# Patient Record
Sex: MALE | Race: WHITE | Employment: FULL TIME | URBAN - METROPOLITAN AREA
[De-identification: names, ages, dates, MRNs, and addresses within clinical notes are randomized per-mention and may not be internally consistent; named-entity substitution may affect disease eponyms.]

---

## 2017-12-22 ENCOUNTER — HOSPITAL ENCOUNTER (OUTPATIENT)
Age: 62
Discharge: HOME | End: 2017-12-22
Payer: COMMERCIAL

## 2017-12-22 LAB
ABSOLUTE EOS #: 0.3 K/UL (ref 0–0.4)
ABSOLUTE IMMATURE GRANULOCYTE: ABNORMAL K/UL (ref 0–0.3)
ABSOLUTE LYMPH #: 2 K/UL (ref 1–4.8)
ABSOLUTE MONO #: 0.8 K/UL (ref 0.2–0.8)
ALBUMIN SERPL-MCNC: 4.1 G/DL (ref 3.5–5.2)
ALBUMIN/GLOBULIN RATIO: ABNORMAL (ref 1–2.5)
ALP BLD-CCNC: 1139 U/L (ref 40–129)
ALT SERPL-CCNC: 54 U/L (ref 5–41)
ANION GAP SERPL CALCULATED.3IONS-SCNC: 14 MMOL/L (ref 9–17)
AST SERPL-CCNC: 43 U/L
BASOPHILS # BLD: 0 % (ref 0–2)
BASOPHILS ABSOLUTE: 0 K/UL (ref 0–0.2)
BILIRUB SERPL-MCNC: 0.59 MG/DL (ref 0.3–1.2)
BUN BLDV-MCNC: 20 MG/DL (ref 8–23)
BUN/CREAT BLD: 19 (ref 9–20)
CALCIUM SERPL-MCNC: 9.4 MG/DL (ref 8.6–10.4)
CHLORIDE BLD-SCNC: 101 MMOL/L (ref 98–107)
CO2: 26 MMOL/L (ref 20–31)
CREAT SERPL-MCNC: 1.04 MG/DL (ref 0.7–1.2)
DIFFERENTIAL TYPE: ABNORMAL
EOSINOPHILS RELATIVE PERCENT: 2 % (ref 1–4)
GFR AFRICAN AMERICAN: >60 ML/MIN
GFR NON-AFRICAN AMERICAN: >60 ML/MIN
GFR SERPL CREATININE-BSD FRML MDRD: ABNORMAL ML/MIN/{1.73_M2}
GFR SERPL CREATININE-BSD FRML MDRD: ABNORMAL ML/MIN/{1.73_M2}
GLUCOSE BLD-MCNC: 116 MG/DL (ref 70–99)
HCT VFR BLD CALC: 49.3 % (ref 41–53)
HEMOGLOBIN: 16.8 G/DL (ref 13.5–17.5)
IMMATURE GRANULOCYTES: ABNORMAL %
LYMPHOCYTES # BLD: 18 % (ref 24–44)
MCH RBC QN AUTO: 29.4 PG (ref 26–34)
MCHC RBC AUTO-ENTMCNC: 34 G/DL (ref 31–37)
MCV RBC AUTO: 86.2 FL (ref 80–100)
MONOCYTES # BLD: 7 % (ref 1–7)
PDW BLD-RTO: 13.3 % (ref 11.5–14.5)
PLATELET # BLD: 159 K/UL (ref 130–400)
PLATELET ESTIMATE: ABNORMAL
PMV BLD AUTO: ABNORMAL FL (ref 6–12)
POTASSIUM SERPL-SCNC: 4.4 MMOL/L (ref 3.7–5.3)
RBC # BLD: 5.71 M/UL (ref 4.5–5.9)
RBC # BLD: ABNORMAL 10*6/UL
SEG NEUTROPHILS: 73 % (ref 36–66)
SEGMENTED NEUTROPHILS ABSOLUTE COUNT: 8.2 K/UL (ref 1.8–7.7)
SODIUM BLD-SCNC: 141 MMOL/L (ref 135–144)
TOTAL PROTEIN: 7.5 G/DL (ref 6.4–8.3)
TSH SERPL DL<=0.05 MIU/L-ACNC: 2.2 MIU/L (ref 0.3–5)
WBC # BLD: 11.3 K/UL (ref 3.5–11)
WBC # BLD: ABNORMAL 10*3/UL

## 2017-12-22 PROCEDURE — 80053 COMPREHEN METABOLIC PANEL: CPT

## 2017-12-22 PROCEDURE — 36415 COLL VENOUS BLD VENIPUNCTURE: CPT

## 2017-12-22 PROCEDURE — 84443 ASSAY THYROID STIM HORMONE: CPT

## 2017-12-22 PROCEDURE — 85025 COMPLETE CBC W/AUTO DIFF WBC: CPT

## 2017-12-25 ENCOUNTER — HOSPITAL ENCOUNTER (INPATIENT)
Age: 62
LOS: 4 days | Discharge: HOME | DRG: 193 | End: 2017-12-29
Attending: EMERGENCY MEDICINE | Admitting: INTERNAL MEDICINE
Payer: COMMERCIAL

## 2017-12-25 ENCOUNTER — APPOINTMENT (OUTPATIENT)
Dept: CT IMAGING | Age: 62
DRG: 193 | End: 2017-12-25
Payer: COMMERCIAL

## 2017-12-25 ENCOUNTER — APPOINTMENT (OUTPATIENT)
Dept: GENERAL RADIOLOGY | Age: 62
DRG: 193 | End: 2017-12-25
Payer: COMMERCIAL

## 2017-12-25 DIAGNOSIS — J18.9 PNEUMONIA DUE TO ORGANISM: Primary | ICD-10-CM

## 2017-12-25 LAB
ABSOLUTE EOS #: 0.3 K/UL (ref 0–0.4)
ABSOLUTE IMMATURE GRANULOCYTE: ABNORMAL K/UL (ref 0–0.3)
ABSOLUTE LYMPH #: 2 K/UL (ref 1–4.8)
ABSOLUTE MONO #: 1 K/UL (ref 0.2–0.8)
ALBUMIN SERPL-MCNC: 3.8 G/DL (ref 3.5–5.2)
ALBUMIN/GLOBULIN RATIO: ABNORMAL (ref 1–2.5)
ALP BLD-CCNC: 1093 U/L (ref 40–129)
ALT SERPL-CCNC: 32 U/L (ref 5–41)
ANION GAP SERPL CALCULATED.3IONS-SCNC: 14 MMOL/L (ref 9–17)
AST SERPL-CCNC: 24 U/L
BASOPHILS # BLD: 1 % (ref 0–2)
BASOPHILS ABSOLUTE: 0.1 K/UL (ref 0–0.2)
BILIRUB SERPL-MCNC: 0.4 MG/DL (ref 0.3–1.2)
BILIRUBIN DIRECT: 0.12 MG/DL
BILIRUBIN, INDIRECT: 0.28 MG/DL (ref 0–1)
BNP INTERPRETATION: NORMAL
BUN BLDV-MCNC: 13 MG/DL (ref 8–23)
BUN/CREAT BLD: 18 (ref 9–20)
CALCIUM SERPL-MCNC: 8.5 MG/DL (ref 8.6–10.4)
CHLORIDE BLD-SCNC: 102 MMOL/L (ref 98–107)
CO2: 24 MMOL/L (ref 20–31)
CREAT SERPL-MCNC: 0.72 MG/DL (ref 0.7–1.2)
D-DIMER QUANTITATIVE: 5.13 MG/L FEU
DIFFERENTIAL TYPE: ABNORMAL
EKG ATRIAL RATE: 92 BPM
EKG P AXIS: 64 DEGREES
EKG P-R INTERVAL: 150 MS
EKG Q-T INTERVAL: 348 MS
EKG QRS DURATION: 96 MS
EKG QTC CALCULATION (BAZETT): 430 MS
EKG R AXIS: -30 DEGREES
EKG T AXIS: 69 DEGREES
EKG VENTRICULAR RATE: 92 BPM
EOSINOPHILS RELATIVE PERCENT: 2 % (ref 1–4)
GFR AFRICAN AMERICAN: >60 ML/MIN
GFR NON-AFRICAN AMERICAN: >60 ML/MIN
GFR SERPL CREATININE-BSD FRML MDRD: ABNORMAL ML/MIN/{1.73_M2}
GFR SERPL CREATININE-BSD FRML MDRD: ABNORMAL ML/MIN/{1.73_M2}
GLOBULIN: ABNORMAL G/DL (ref 1.5–3.8)
GLUCOSE BLD-MCNC: 94 MG/DL (ref 70–99)
HCT VFR BLD CALC: 44.8 % (ref 41–53)
HEMOGLOBIN: 15.2 G/DL (ref 13.5–17.5)
IMMATURE GRANULOCYTES: ABNORMAL %
LYMPHOCYTES # BLD: 15 % (ref 24–44)
MCH RBC QN AUTO: 29.4 PG (ref 26–34)
MCHC RBC AUTO-ENTMCNC: 33.8 G/DL (ref 31–37)
MCV RBC AUTO: 86.8 FL (ref 80–100)
MONOCYTES # BLD: 7 % (ref 1–7)
PDW BLD-RTO: 14.2 % (ref 11.5–14.5)
PLATELET # BLD: 156 K/UL (ref 130–400)
PLATELET ESTIMATE: ABNORMAL
PMV BLD AUTO: 8.3 FL (ref 6–12)
POTASSIUM SERPL-SCNC: 4 MMOL/L (ref 3.7–5.3)
PRO-BNP: 101 PG/ML
RBC # BLD: 5.17 M/UL (ref 4.5–5.9)
RBC # BLD: ABNORMAL 10*6/UL
SEG NEUTROPHILS: 75 % (ref 36–66)
SEGMENTED NEUTROPHILS ABSOLUTE COUNT: 10 K/UL (ref 1.8–7.7)
SODIUM BLD-SCNC: 140 MMOL/L (ref 135–144)
TOTAL PROTEIN: 7 G/DL (ref 6.4–8.3)
TROPONIN INTERP: NORMAL
TROPONIN T: <0.03 NG/ML
WBC # BLD: 13.4 K/UL (ref 3.5–11)
WBC # BLD: ABNORMAL 10*3/UL

## 2017-12-25 PROCEDURE — 6370000000 HC RX 637 (ALT 250 FOR IP): Performed by: FAMILY MEDICINE

## 2017-12-25 PROCEDURE — 2580000003 HC RX 258: Performed by: INTERNAL MEDICINE

## 2017-12-25 PROCEDURE — 80076 HEPATIC FUNCTION PANEL: CPT

## 2017-12-25 PROCEDURE — 6360000002 HC RX W HCPCS: Performed by: FAMILY MEDICINE

## 2017-12-25 PROCEDURE — 96375 TX/PRO/DX INJ NEW DRUG ADDON: CPT

## 2017-12-25 PROCEDURE — 6360000002 HC RX W HCPCS: Performed by: EMERGENCY MEDICINE

## 2017-12-25 PROCEDURE — 85379 FIBRIN DEGRADATION QUANT: CPT

## 2017-12-25 PROCEDURE — 1200000000 HC SEMI PRIVATE

## 2017-12-25 PROCEDURE — 80048 BASIC METABOLIC PNL TOTAL CA: CPT

## 2017-12-25 PROCEDURE — 85025 COMPLETE CBC W/AUTO DIFF WBC: CPT

## 2017-12-25 PROCEDURE — 94640 AIRWAY INHALATION TREATMENT: CPT

## 2017-12-25 PROCEDURE — 84484 ASSAY OF TROPONIN QUANT: CPT

## 2017-12-25 PROCEDURE — 87040 BLOOD CULTURE FOR BACTERIA: CPT

## 2017-12-25 PROCEDURE — 6360000002 HC RX W HCPCS: Performed by: INTERNAL MEDICINE

## 2017-12-25 PROCEDURE — 6360000004 HC RX CONTRAST MEDICATION: Performed by: EMERGENCY MEDICINE

## 2017-12-25 PROCEDURE — 94664 DEMO&/EVAL PT USE INHALER: CPT

## 2017-12-25 PROCEDURE — 83880 ASSAY OF NATRIURETIC PEPTIDE: CPT

## 2017-12-25 PROCEDURE — 99285 EMERGENCY DEPT VISIT HI MDM: CPT

## 2017-12-25 PROCEDURE — 2580000003 HC RX 258: Performed by: EMERGENCY MEDICINE

## 2017-12-25 PROCEDURE — 96374 THER/PROPH/DIAG INJ IV PUSH: CPT

## 2017-12-25 PROCEDURE — 93005 ELECTROCARDIOGRAM TRACING: CPT

## 2017-12-25 PROCEDURE — 71260 CT THORAX DX C+: CPT

## 2017-12-25 PROCEDURE — 71010 XR CHEST PORTABLE: CPT

## 2017-12-25 PROCEDURE — 2700000000 HC OXYGEN THERAPY PER DAY

## 2017-12-25 RX ORDER — FENTANYL CITRATE 50 UG/ML
25 INJECTION, SOLUTION INTRAMUSCULAR; INTRAVENOUS ONCE
Status: COMPLETED | OUTPATIENT
Start: 2017-12-25 | End: 2017-12-25

## 2017-12-25 RX ORDER — VARENICLINE TARTRATE 0.5 MG/1
0.5 TABLET, FILM COATED ORAL 2 TIMES DAILY WITH MEALS
Status: DISCONTINUED | OUTPATIENT
Start: 2017-12-25 | End: 2017-12-29 | Stop reason: HOSPADM

## 2017-12-25 RX ORDER — ALBUTEROL SULFATE 2.5 MG/3ML
SOLUTION RESPIRATORY (INHALATION)
Status: DISCONTINUED
Start: 2017-12-25 | End: 2017-12-25

## 2017-12-25 RX ORDER — SODIUM CHLORIDE 9 MG/ML
INJECTION, SOLUTION INTRAVENOUS CONTINUOUS
Status: DISCONTINUED | OUTPATIENT
Start: 2017-12-25 | End: 2017-12-27

## 2017-12-25 RX ORDER — SODIUM CHLORIDE 0.9 % (FLUSH) 0.9 %
10 SYRINGE (ML) INJECTION
Status: COMPLETED | OUTPATIENT
Start: 2017-12-25 | End: 2017-12-25

## 2017-12-25 RX ORDER — SODIUM CHLORIDE 9 MG/ML
INJECTION, SOLUTION INTRAVENOUS CONTINUOUS
Status: DISCONTINUED | OUTPATIENT
Start: 2017-12-25 | End: 2017-12-25 | Stop reason: SDUPTHER

## 2017-12-25 RX ORDER — SODIUM CHLORIDE 0.9 % (FLUSH) 0.9 %
10 SYRINGE (ML) INJECTION PRN
Status: DISCONTINUED | OUTPATIENT
Start: 2017-12-25 | End: 2017-12-29 | Stop reason: HOSPADM

## 2017-12-25 RX ORDER — SODIUM CHLORIDE 0.9 % (FLUSH) 0.9 %
10 SYRINGE (ML) INJECTION EVERY 12 HOURS SCHEDULED
Status: DISCONTINUED | OUTPATIENT
Start: 2017-12-25 | End: 2017-12-29 | Stop reason: HOSPADM

## 2017-12-25 RX ORDER — FLUOXETINE HYDROCHLORIDE 20 MG/1
20 CAPSULE ORAL DAILY
Status: DISCONTINUED | OUTPATIENT
Start: 2017-12-25 | End: 2017-12-29 | Stop reason: HOSPADM

## 2017-12-25 RX ORDER — ALBUTEROL SULFATE 2.5 MG/3ML
2.5 SOLUTION RESPIRATORY (INHALATION) EVERY 6 HOURS PRN
Status: DISCONTINUED | OUTPATIENT
Start: 2017-12-25 | End: 2017-12-29 | Stop reason: HOSPADM

## 2017-12-25 RX ORDER — FENTANYL CITRATE 50 UG/ML
50 INJECTION, SOLUTION INTRAMUSCULAR; INTRAVENOUS ONCE
Status: COMPLETED | OUTPATIENT
Start: 2017-12-25 | End: 2017-12-25

## 2017-12-25 RX ORDER — FENTANYL CITRATE 50 UG/ML
50 INJECTION, SOLUTION INTRAMUSCULAR; INTRAVENOUS 4 TIMES DAILY PRN
Status: DISCONTINUED | OUTPATIENT
Start: 2017-12-25 | End: 2017-12-25

## 2017-12-25 RX ORDER — FLUOXETINE HYDROCHLORIDE 20 MG/1
20 CAPSULE ORAL DAILY
COMMUNITY

## 2017-12-25 RX ORDER — CEFTRIAXONE 1 G/1
1 INJECTION, POWDER, FOR SOLUTION INTRAMUSCULAR; INTRAVENOUS ONCE
Status: DISCONTINUED | OUTPATIENT
Start: 2017-12-25 | End: 2017-12-25 | Stop reason: CLARIF

## 2017-12-25 RX ORDER — ACETAMINOPHEN 325 MG/1
650 TABLET ORAL EVERY 4 HOURS PRN
Status: DISCONTINUED | OUTPATIENT
Start: 2017-12-25 | End: 2017-12-29 | Stop reason: HOSPADM

## 2017-12-25 RX ORDER — PANTOPRAZOLE SODIUM 40 MG/1
40 TABLET, DELAYED RELEASE ORAL
Status: DISCONTINUED | OUTPATIENT
Start: 2017-12-25 | End: 2017-12-29 | Stop reason: HOSPADM

## 2017-12-25 RX ORDER — FENTANYL CITRATE 50 UG/ML
50 INJECTION, SOLUTION INTRAMUSCULAR; INTRAVENOUS EVERY 4 HOURS PRN
Status: DISCONTINUED | OUTPATIENT
Start: 2017-12-25 | End: 2017-12-29 | Stop reason: HOSPADM

## 2017-12-25 RX ORDER — VARENICLINE TARTRATE 1 MG/1
1 TABLET, FILM COATED ORAL 2 TIMES DAILY
Status: ON HOLD | COMMUNITY
End: 2018-01-28 | Stop reason: HOSPADM

## 2017-12-25 RX ORDER — 0.9 % SODIUM CHLORIDE 0.9 %
50 INTRAVENOUS SOLUTION INTRAVENOUS ONCE
Status: COMPLETED | OUTPATIENT
Start: 2017-12-25 | End: 2017-12-25

## 2017-12-25 RX ORDER — IBUPROFEN 600 MG/1
600 TABLET ORAL EVERY 6 HOURS PRN
Status: ON HOLD | COMMUNITY
End: 2017-12-29 | Stop reason: HOSPADM

## 2017-12-25 RX ORDER — OMEPRAZOLE 20 MG/1
20 CAPSULE, DELAYED RELEASE ORAL DAILY
COMMUNITY

## 2017-12-25 RX ADMIN — SODIUM CHLORIDE: 9 INJECTION, SOLUTION INTRAVENOUS at 21:57

## 2017-12-25 RX ADMIN — VARENICLINE TARTRATE 0.5 MG: 0.5 TABLET, FILM COATED ORAL at 18:22

## 2017-12-25 RX ADMIN — Medication 10 ML: at 14:56

## 2017-12-25 RX ADMIN — IOPAMIDOL 80 ML: 755 INJECTION, SOLUTION INTRAVENOUS at 14:56

## 2017-12-25 RX ADMIN — SODIUM CHLORIDE: 9 INJECTION, SOLUTION INTRAVENOUS at 18:22

## 2017-12-25 RX ADMIN — FENTANYL CITRATE 25 MCG: 50 INJECTION INTRAMUSCULAR; INTRAVENOUS at 16:27

## 2017-12-25 RX ADMIN — WATER 1 G: 1 INJECTION INTRAMUSCULAR; INTRAVENOUS; SUBCUTANEOUS at 15:39

## 2017-12-25 RX ADMIN — ALBUTEROL SULFATE 2.5 MG: 5 SOLUTION RESPIRATORY (INHALATION) at 13:25

## 2017-12-25 RX ADMIN — FENTANYL CITRATE 50 MCG: 50 INJECTION INTRAMUSCULAR; INTRAVENOUS at 21:56

## 2017-12-25 RX ADMIN — SODIUM CHLORIDE 50 ML: 9 INJECTION, SOLUTION INTRAVENOUS at 14:56

## 2017-12-25 RX ADMIN — FENTANYL CITRATE 50 MCG: 50 INJECTION INTRAMUSCULAR; INTRAVENOUS at 13:59

## 2017-12-25 RX ADMIN — ENOXAPARIN SODIUM 40 MG: 40 INJECTION SUBCUTANEOUS at 18:30

## 2017-12-25 RX ADMIN — AZITHROMYCIN MONOHYDRATE 500 MG: 500 INJECTION, POWDER, LYOPHILIZED, FOR SOLUTION INTRAVENOUS at 15:46

## 2017-12-25 RX ADMIN — SODIUM CHLORIDE 1000 ML: 9 INJECTION, SOLUTION INTRAVENOUS at 13:38

## 2017-12-25 ASSESSMENT — ENCOUNTER SYMPTOMS
GASTROINTESTINAL NEGATIVE: 1
ALLERGIC/IMMUNOLOGIC NEGATIVE: 1
EYES NEGATIVE: 1
SHORTNESS OF BREATH: 1
COUGH: 1

## 2017-12-25 ASSESSMENT — PAIN SCALES - GENERAL
PAINLEVEL_OUTOF10: 7
PAINLEVEL_OUTOF10: 7
PAINLEVEL_OUTOF10: 8
PAINLEVEL_OUTOF10: 7

## 2017-12-25 ASSESSMENT — PAIN DESCRIPTION - PROGRESSION: CLINICAL_PROGRESSION: GRADUALLY WORSENING

## 2017-12-25 ASSESSMENT — PAIN DESCRIPTION - ORIENTATION: ORIENTATION: RIGHT;LEFT

## 2017-12-25 ASSESSMENT — PAIN DESCRIPTION - ONSET: ONSET: ON-GOING

## 2017-12-25 ASSESSMENT — PAIN DESCRIPTION - FREQUENCY: FREQUENCY: CONTINUOUS

## 2017-12-25 ASSESSMENT — PAIN DESCRIPTION - PAIN TYPE: TYPE: ACUTE PAIN

## 2017-12-25 ASSESSMENT — PAIN DESCRIPTION - DESCRIPTORS: DESCRIPTORS: SHARP;ACHING;DULL

## 2017-12-25 NOTE — ED PROVIDER NOTES
[address] [place] [address] ED  eMERGENCY dEPARTMENT eNCOUnter      Pt Name: Roberto Nam  MRN: [id number]  [place] 1955  Date of evaluation: 12/25/2017  Provider: Cassy Dillard MD    [address] [place] [address]       Chief Complaint   Patient presents with    Chest Pain    Shortness of Breath         HISTORY OF PRESENT ILLNESS  (Location/Symptom, Timing/Onset, Context/Setting, Quality, Duration, Modifying Factors, Severity.)   Roberto Nam is a 60 y.o. male who presents to the emergency department   The complaint of intermittent chest and chest pain and cough and was initially in the left side for the last few days with pain to right side  On  coughing and inspiration now  The patient was treated with 3 courses of antibiotics  And intermittent steroid within last 6 weeks  Being schduled for stress test,,    Pt is smoker , symptomps strated 6 weeks ago  PAST MEDICAL HISTORY     Past Medical History:   Diagnosis Date    Chronic fatigue     COPD [facility]chronic obstructive pulmonary disease) ([facility] [address]     Depression          SURGICAL HISTORY       Past Surgical History:   Procedure Laterality Date    APPENDECTOMY           CURRENT MEDICATIONS       Previous Medications    FLUOXETINE (PROZAC) 20 MG CAPSULE    Take 20 mg by mouth daily    IBUPROFEN (ADVIL;MOTRIN) 600 MG TABLET    Take 600 mg by mouth every 6 hours as needed for Pain    MOMETASONE-FORMOTEROL (DULERA) 200-5 MCG/ACT INHALER    Inhale 2 puffs into the lungs every 12 hours    OMEPRAZOLE (PRILOSEC) 20 MG DELAYED RELEASE CAPSULE    Take 20 mg by mouth daily    TIOTROPIUM (SPIRIVA) 18 MCG INHALATION CAPSULE    Inhale 18 mcg into the lungs daily    VARENICLINE (CHANTIX) 0.5 MG TABLET    Take 0.5 mg by mouth 2 times daily       ALLERGIES     Review of patient's allergies indicates no known allergies. FAMILY HISTORY     History reviewed. No pertinent family history.        SOCIAL HISTORY       Social History     Social History    Marital status:      Spouse name: N/A  Number of children: N/A    Years of education: N/A     Social History Main Topics    Smoking status: Heavy Tobacco Smoker     Packs/day: 1.00     Years: 25.00     Types: Cigarettes    Smokeless tobacco: Never Used    Alcohol use No    Drug use: Unknown    Sexual activity: Not Asked     Other Topics Concern    None     Social History Narrative    None         REVIEW OF SYSTEMS    (2-9 systems for level 4, 10 or more for level 5)     Review of Systems   Eyes: Negative. Respiratory: Positive for cough and shortness of breath. Cardiovascular: Positive for chest pain. Gastrointestinal: Negative. Endocrine: Negative. Genitourinary: Negative. Musculoskeletal: Negative. Allergic/Immunologic: Negative. Neurological: Negative. Hematological: Negative. Psychiatric/Behavioral: Negative. Except as noted above the remainder of the review of systems was reviewed and negative. PHYSICAL EXAM    (up to 7 for level 4, 8 or more for level 5)     ED Triage Vitals [12/25/17 1311]   BP Temp Temp Source Pulse Resp SpO2 Height Weight   (!) 144/98 97.5 °F (36.4 °C) Oral 98 20 90 % 6' 1\" (1.854 m) 230 lb (104.3 kg)       Physical Exam   Constitutional: He is oriented to person, place, and time. He appears well-developed and well-nourished. HENT:   Head: Normocephalic and atraumatic. Neck: Neck supple. Cardiovascular: Normal rate and regular rhythm. Pulmonary/Chest: Effort normal. No respiratory distress. He has no wheezes. He has no rales. Scattered rhonchi   Abdominal: Soft. Bowel sounds are normal.   Liver palpable tenderness on touch   Musculoskeletal: Normal range of motion. Neurological: He is alert and oriented to person, place, and time. Skin: Skin is warm and dry. Nursing note and vitals reviewed.         DIAGNOSTIC RESULTS     EKG: All EKG's are interpreted by the Emergency Department Physician who either signs or Co-signs this chart in the absence of a cardiologist.    Normal sinus rhythm, left axis deviation    RADIOLOGY: :  Xr Chest Portable    Result Date: 12/25/2017  EXAMINATION: SINGLE VIEW OF THE CHEST 12/25/2017 1:28 pm COMPARISON: 12/01/2012, 1438 hours HISTORY: ORDERING SYSTEM PROVIDED HISTORY: Chest Pain TECHNOLOGIST PROVIDED HISTORY: Reason for exam:->Chest Pain Ordering Physician Provided Reason for Exam: Port upright AP CXR - SOB Acuity: Unknown Type of Exam: Unknown 68-year-old male with chest pain and shortness of breath FINDINGS: Portable AP upright view of the chest. Cardiac monitor leads overlie the chest. Cardiac and mediastinal contours unchanged and within normal limits. Mild diffuse pulmonary vascular congestion. Mild bibasilar atelectasis. Small left-sided pleural effusion. Underlying left basilar infiltrate not excluded. Atherosclerotic calcification of the thoracic aorta. No pneumothorax. No free air. No acute osseous abnormality is evident. 1. Mild diffuse pulmonary vascular congestion, small left-sided pleural effusion and mild bibasilar atelectasis. Findings may represent mild CHF related changes. 2. Underlying infiltrate at the left base not excluded. Follow-up recommend to document resolution. Ct Chest Pulmonary Embolism W Contrast    Result Date: 12/25/2017  EXAMINATION: CTA OF THE CHEST 12/25/2017 2:20 pm TECHNIQUE: CTA of the chest was performed after the administration of intravenous contrast.  Multiplanar reformatted images are provided for review. MIP images are provided for review. Dose modulation, iterative reconstruction, and/or weight based adjustment of the mA/kV was utilized to reduce the radiation dose to as low as reasonably achievable. COMPARISON: Portable chest from 12/25/2017 HISTORY: ORDERING SYSTEM PROVIDED HISTORY: d dimer elevated 68-year-old male with elevated D-dimer FINDINGS: Initial pulmonary angiogram CT exam was severely limited.   The patient was reinjected with contrast after discussion All other components within normal limits   CULTURE BLOOD #1   CULTURE BLOOD #1   D-DIMER, QUANTITATIVE   TROPONIN   BRAIN NATRIURETIC PEPTIDE   URINALYSIS       All other labs were within normal range or not returned as of this dictation. EMERGENCY DEPARTMENT COURSE and DIFFERENTIAL DIAGNOSIS/MDM:   Vitals:    Vitals:    12/25/17 1414 12/25/17 1429 12/25/17 1500 12/25/17 1515   BP: 134/66 (!) 140/52 (!) 135/56 (!) 129/52   Pulse: 95 96 94 93   Resp: 19 23 16 22   Temp:       TempSrc:       SpO2: 96% 94% 96% 97%   Weight:       Height:         Dr Jesse Gonzalez, discussed with Bravo    CONSULTS:  IP CONSULT TO INTERNAL MEDICINE    PROCEDURES:  None  Iv hydration, aerosol, antibiotics  FINAL IMPRESSION      1. Pneumonia due to organism      Possible matastatic  Disease, radiologist report    DISPOSITION/PLAN   DISPOSITION Decision To Admit 12/25/2017 03:34:42 PM    Hold tylenol  PATIENT REFERRED TO:   No follow-up provider specified.     DISCHARGE MEDICATIONS:     New Prescriptions    No medications on file           (Please note that portions of this note were completed with a voice recognition program.  Efforts were made to edit the dictations but occasionally words are mis-transcribed.)    Julianna Jung MD  Attending Emergency Physician          Julianna Jung MD  12/25/17 MD Álvaro  12/25/17 9703

## 2017-12-25 NOTE — PROGRESS NOTES
Pt admitted to room 2026. Oriented to room, call light and bed mechanics. Side rails up x2. Call light within reach. Orders reviewed.

## 2017-12-26 ENCOUNTER — APPOINTMENT (OUTPATIENT)
Dept: CT IMAGING | Age: 62
DRG: 193 | End: 2017-12-26
Payer: COMMERCIAL

## 2017-12-26 LAB
ABSOLUTE EOS #: 0.2 K/UL (ref 0–0.4)
ABSOLUTE IMMATURE GRANULOCYTE: ABNORMAL K/UL (ref 0–0.3)
ABSOLUTE LYMPH #: 1.4 K/UL (ref 1–4.8)
ABSOLUTE MONO #: 0.6 K/UL (ref 0.2–0.8)
ALBUMIN SERPL-MCNC: 3.2 G/DL (ref 3.5–5.2)
ALBUMIN/GLOBULIN RATIO: ABNORMAL (ref 1–2.5)
ALP BLD-CCNC: 870 U/L (ref 40–129)
ALT SERPL-CCNC: 28 U/L (ref 5–41)
ANION GAP SERPL CALCULATED.3IONS-SCNC: 11 MMOL/L (ref 9–17)
AST SERPL-CCNC: 20 U/L
BASOPHILS # BLD: 0 % (ref 0–2)
BASOPHILS ABSOLUTE: 0 K/UL (ref 0–0.2)
BILIRUB SERPL-MCNC: 0.53 MG/DL (ref 0.3–1.2)
BILIRUBIN DIRECT: 0.19 MG/DL
BILIRUBIN, INDIRECT: 0.34 MG/DL (ref 0–1)
BUN BLDV-MCNC: 10 MG/DL (ref 8–23)
CALCIUM SERPL-MCNC: 8.3 MG/DL (ref 8.6–10.4)
CHLORIDE BLD-SCNC: 101 MMOL/L (ref 98–107)
CO2: 25 MMOL/L (ref 20–31)
CREAT SERPL-MCNC: 0.62 MG/DL (ref 0.7–1.2)
DIFFERENTIAL TYPE: ABNORMAL
EOSINOPHILS RELATIVE PERCENT: 2 % (ref 1–4)
GFR AFRICAN AMERICAN: >60 ML/MIN
GFR NON-AFRICAN AMERICAN: >60 ML/MIN
GFR SERPL CREATININE-BSD FRML MDRD: ABNORMAL ML/MIN/{1.73_M2}
GFR SERPL CREATININE-BSD FRML MDRD: ABNORMAL ML/MIN/{1.73_M2}
GLUCOSE BLD-MCNC: 126 MG/DL (ref 70–99)
HCT VFR BLD CALC: 38.7 % (ref 41–53)
HEMOGLOBIN: 13.2 G/DL (ref 13.5–17.5)
IMMATURE GRANULOCYTES: ABNORMAL %
INR BLD: 1
LYMPHOCYTES # BLD: 14 % (ref 24–44)
MCH RBC QN AUTO: 29.1 PG (ref 26–34)
MCHC RBC AUTO-ENTMCNC: 34 G/DL (ref 31–37)
MCV RBC AUTO: 85.6 FL (ref 80–100)
MONOCYTES # BLD: 6 % (ref 1–7)
PARTIAL THROMBOPLASTIN TIME: 34 SEC (ref 23–31)
PDW BLD-RTO: 13.1 % (ref 11.5–14.5)
PLATELET # BLD: 132 K/UL (ref 130–400)
PLATELET ESTIMATE: ABNORMAL
PMV BLD AUTO: ABNORMAL FL (ref 6–12)
POTASSIUM SERPL-SCNC: 3.8 MMOL/L (ref 3.7–5.3)
PROSTATE SPECIFIC ANTIGEN: 1.68 UG/L
PROTHROMBIN TIME: 10.4 SEC (ref 9.7–11.6)
RBC # BLD: 4.52 M/UL (ref 4.5–5.9)
RBC # BLD: ABNORMAL 10*6/UL
SEG NEUTROPHILS: 78 % (ref 36–66)
SEGMENTED NEUTROPHILS ABSOLUTE COUNT: 7.9 K/UL (ref 1.8–7.7)
SODIUM BLD-SCNC: 137 MMOL/L (ref 135–144)
TOTAL PROTEIN: 6 G/DL (ref 6.4–8.3)
WBC # BLD: 10.1 K/UL (ref 3.5–11)
WBC # BLD: ABNORMAL 10*3/UL

## 2017-12-26 PROCEDURE — 6370000000 HC RX 637 (ALT 250 FOR IP): Performed by: INTERNAL MEDICINE

## 2017-12-26 PROCEDURE — 6370000000 HC RX 637 (ALT 250 FOR IP): Performed by: FAMILY MEDICINE

## 2017-12-26 PROCEDURE — 85610 PROTHROMBIN TIME: CPT

## 2017-12-26 PROCEDURE — 6360000004 HC RX CONTRAST MEDICATION: Performed by: INTERNAL MEDICINE

## 2017-12-26 PROCEDURE — 85730 THROMBOPLASTIN TIME PARTIAL: CPT

## 2017-12-26 PROCEDURE — 2700000000 HC OXYGEN THERAPY PER DAY

## 2017-12-26 PROCEDURE — 84153 ASSAY OF PSA TOTAL: CPT

## 2017-12-26 PROCEDURE — 85025 COMPLETE CBC W/AUTO DIFF WBC: CPT

## 2017-12-26 PROCEDURE — 2500000003 HC RX 250 WO HCPCS: Performed by: EMERGENCY MEDICINE

## 2017-12-26 PROCEDURE — 6360000002 HC RX W HCPCS: Performed by: INTERNAL MEDICINE

## 2017-12-26 PROCEDURE — 1200000000 HC SEMI PRIVATE

## 2017-12-26 PROCEDURE — 6360000002 HC RX W HCPCS: Performed by: FAMILY MEDICINE

## 2017-12-26 PROCEDURE — 2580000003 HC RX 258: Performed by: INTERNAL MEDICINE

## 2017-12-26 PROCEDURE — 36415 COLL VENOUS BLD VENIPUNCTURE: CPT

## 2017-12-26 PROCEDURE — 94640 AIRWAY INHALATION TREATMENT: CPT

## 2017-12-26 PROCEDURE — 94760 N-INVAS EAR/PLS OXIMETRY 1: CPT

## 2017-12-26 PROCEDURE — 74177 CT ABD & PELVIS W/CONTRAST: CPT

## 2017-12-26 PROCEDURE — 80053 COMPREHEN METABOLIC PANEL: CPT

## 2017-12-26 PROCEDURE — 6360000002 HC RX W HCPCS: Performed by: EMERGENCY MEDICINE

## 2017-12-26 PROCEDURE — 82248 BILIRUBIN DIRECT: CPT

## 2017-12-26 RX ORDER — 0.9 % SODIUM CHLORIDE 0.9 %
50 INTRAVENOUS SOLUTION INTRAVENOUS ONCE
Status: COMPLETED | OUTPATIENT
Start: 2017-12-26 | End: 2017-12-26

## 2017-12-26 RX ORDER — IPRATROPIUM BROMIDE AND ALBUTEROL SULFATE 2.5; .5 MG/3ML; MG/3ML
1 SOLUTION RESPIRATORY (INHALATION) 4 TIMES DAILY
Status: DISCONTINUED | OUTPATIENT
Start: 2017-12-26 | End: 2017-12-29 | Stop reason: HOSPADM

## 2017-12-26 RX ORDER — SODIUM CHLORIDE 0.9 % (FLUSH) 0.9 %
10 SYRINGE (ML) INJECTION PRN
Status: DISCONTINUED | OUTPATIENT
Start: 2017-12-26 | End: 2017-12-29 | Stop reason: HOSPADM

## 2017-12-26 RX ADMIN — ALBUTEROL SULFATE 2.5 MG: 2.5 SOLUTION RESPIRATORY (INHALATION) at 07:31

## 2017-12-26 RX ADMIN — FENTANYL CITRATE 50 MCG: 50 INJECTION INTRAMUSCULAR; INTRAVENOUS at 04:57

## 2017-12-26 RX ADMIN — VARENICLINE TARTRATE 0.5 MG: 0.5 TABLET, FILM COATED ORAL at 17:14

## 2017-12-26 RX ADMIN — VARENICLINE TARTRATE 0.5 MG: 0.5 TABLET, FILM COATED ORAL at 08:35

## 2017-12-26 RX ADMIN — CEFTRIAXONE SODIUM 1 G: 10 INJECTION, POWDER, FOR SOLUTION INTRAVENOUS at 14:36

## 2017-12-26 RX ADMIN — Medication 10 ML: at 16:19

## 2017-12-26 RX ADMIN — FENTANYL CITRATE 50 MCG: 50 INJECTION INTRAMUSCULAR; INTRAVENOUS at 11:43

## 2017-12-26 RX ADMIN — SODIUM CHLORIDE: 9 INJECTION, SOLUTION INTRAVENOUS at 19:49

## 2017-12-26 RX ADMIN — IPRATROPIUM BROMIDE AND ALBUTEROL SULFATE 1 AMPULE: .5; 3 SOLUTION RESPIRATORY (INHALATION) at 21:11

## 2017-12-26 RX ADMIN — PANTOPRAZOLE SODIUM 40 MG: 40 TABLET, DELAYED RELEASE ORAL at 08:35

## 2017-12-26 RX ADMIN — IPRATROPIUM BROMIDE AND ALBUTEROL SULFATE 1 AMPULE: .5; 3 SOLUTION RESPIRATORY (INHALATION) at 15:38

## 2017-12-26 RX ADMIN — ENOXAPARIN SODIUM 40 MG: 40 INJECTION SUBCUTANEOUS at 08:35

## 2017-12-26 RX ADMIN — FENTANYL CITRATE 50 MCG: 50 INJECTION INTRAMUSCULAR; INTRAVENOUS at 19:48

## 2017-12-26 RX ADMIN — MOMETASONE FUROATE AND FORMOTEROL FUMARATE DIHYDRATE 2 PUFF: 200; 5 AEROSOL RESPIRATORY (INHALATION) at 21:11

## 2017-12-26 RX ADMIN — IOPAMIDOL 125 ML: 755 INJECTION, SOLUTION INTRAVENOUS at 16:19

## 2017-12-26 RX ADMIN — AZITHROMYCIN MONOHYDRATE 500 MG: 500 INJECTION, POWDER, LYOPHILIZED, FOR SOLUTION INTRAVENOUS at 17:14

## 2017-12-26 RX ADMIN — SODIUM CHLORIDE: 9 INJECTION, SOLUTION INTRAVENOUS at 08:35

## 2017-12-26 RX ADMIN — MOMETASONE FUROATE AND FORMOTEROL FUMARATE DIHYDRATE 2 PUFF: 200; 5 AEROSOL RESPIRATORY (INHALATION) at 07:33

## 2017-12-26 RX ADMIN — SODIUM CHLORIDE 50 ML: 9 INJECTION, SOLUTION INTRAVENOUS at 16:19

## 2017-12-26 RX ADMIN — TIOTROPIUM BROMIDE 18 MCG: 18 CAPSULE ORAL; RESPIRATORY (INHALATION) at 07:32

## 2017-12-26 RX ADMIN — FLUOXETINE 20 MG: 20 CAPSULE ORAL at 08:35

## 2017-12-26 RX ADMIN — ALBUTEROL SULFATE 2.5 MG: 2.5 SOLUTION RESPIRATORY (INHALATION) at 12:01

## 2017-12-26 ASSESSMENT — PAIN DESCRIPTION - PAIN TYPE: TYPE: ACUTE PAIN

## 2017-12-26 ASSESSMENT — PAIN DESCRIPTION - ONSET: ONSET: ON-GOING

## 2017-12-26 ASSESSMENT — PAIN DESCRIPTION - DESCRIPTORS: DESCRIPTORS: DISCOMFORT;ACHING

## 2017-12-26 ASSESSMENT — PAIN DESCRIPTION - LOCATION: LOCATION: CHEST

## 2017-12-26 ASSESSMENT — PAIN SCALES - GENERAL
PAINLEVEL_OUTOF10: 6
PAINLEVEL_OUTOF10: 7
PAINLEVEL_OUTOF10: 6

## 2017-12-26 ASSESSMENT — PAIN DESCRIPTION - ORIENTATION: ORIENTATION: RIGHT;LEFT

## 2017-12-26 ASSESSMENT — PAIN DESCRIPTION - FREQUENCY: FREQUENCY: CONTINUOUS

## 2017-12-26 NOTE — CARE COORDINATION
metastatic disease. Await Dr. Michael Robles to see. Discussed potential for nebulizer. Pt is currently receiving treatments. Pt will use [facility] if needed. No other dc needs anticipated.         Electronically signed by Annalisa Colby RN on 12/26/17 at 9:53 AM

## 2017-12-26 NOTE — PLAN OF CARE
Problem: Gas Exchange - Impaired:  Goal: Levels of oxygenation will improve  Levels of oxygenation will improve   Outcome: Ongoing  Maintain normal lab values, continue to monitor respiratory status.

## 2017-12-26 NOTE — H&P
History and Physical/admit note      CHIEF COMPLAINT:  Cough shortness of breath    History of Present Illness: 69-year-old white gentleman admitted to the emergency room with shortness of breath daily on and on off  ×2 months dated 7/10 worse with exertion and better with rest has been getting worse in the last few days with tachypnea and occasional wheezing no PND no orthopnea, denies fever chills, cough on and off daily recurrent ×2 months with clear phlegm no hemoptysis, no angina no palpitation        Past Medical History:   Diagnosis Date    Chronic fatigue     COPD [facility]chronic obstructive pulmonary disease) ([facility] [address]     Depression          Past Surgical History:   Procedure Laterality Date    APPENDECTOMY         Medications Prior to Admission:    Prescriptions Prior to Admission: FLUoxetine (PROZAC) 20 MG capsule, Take 20 mg by mouth daily  omeprazole (PRILOSEC) 20 MG delayed release capsule, Take 20 mg by mouth daily  mometasone-formoterol (DULERA) 200-5 MCG/ACT inhaler, Inhale 2 puffs into the lungs every 12 hours  tiotropium (SPIRIVA) 18 MCG inhalation capsule, Inhale 18 mcg into the lungs daily  ibuprofen (ADVIL;MOTRIN) 600 MG tablet, Take 600 mg by mouth every 6 hours as needed for Pain  varenicline (CHANTIX) 0.5 MG tablet, Take 0.5 mg by mouth 2 times daily    Allergies:    Review of patient's allergies indicates no known allergies. Social History:    reports that he has been smoking Cigarettes. He has a 25.00 pack-year smoking history. He has never used smokeless tobacco. He reports that he does not drink alcohol. Family History:   family history is not on file.     REVIEW OF SYSTEMS:  Constitutional: negative  Eyes: negative  Ears, nose, mouth, throat, and face: negative  Respiratory: negative, Dyspnea tachypnea and wheezing and cough  Cardiovascular: negative, No angina and no palpitation no dizziness  Gastrointestinal: negative, No nausea vomiting no diarrhea or constipation no abdominal

## 2017-12-26 NOTE — CONSULTS
870 12/26/2017    ALT 28 12/26/2017    AST 20 12/26/2017    PROT 6.0 12/26/2017    BILITOT 0.53 12/26/2017    BILIDIR 0.19 12/26/2017    IBILI 0.34 12/26/2017    LABALBU 3.2 12/26/2017         Radiology          CT Scans            1. Somewhat limited evaluation for pulmonary embolus.  No obvious central   filling defect identified in the main pulmonary arterial vasculature as above. 2. Findings consistent with diffuse osseous metastatic disease.  Deformity of   the inferior endplate of T2 could represent a pathologic fracture. 3. Diffuse interstitial thickening throughout both lungs with multiple areas   of ground-glass opacity and scattered nodularity.  This could represent   pulmonary metastatic disease and/or lymphangitic spread of tumor. 4. Left lower lobe consolidation with small bilateral pleural effusions. Passive atelectasis in the right lower lobe.  Underlying mass lesion the left   lower lobe consolidation not excluded. 5. Enlarged gastrohepatic lymph node.  Stranding of the fat surrounding the   left adrenal gland.  Potential 1.2 cm left adrenal mass. 6. Prominent left hilar soft tissue/left hilar lymphadenopathy.  Right   paratracheal lymph node measuring 1.9 x 1.4 cm.   7. Small hiatal hernia. (See actual reports for details)    \"Thank you for asking us to see this patient\"    Case discussed with nurse and patient/family. Questions and concerns addressed.     Electronically signed by     Tapan Quintero MD on 12/26/2017 at 2:52 PM

## 2017-12-27 ENCOUNTER — APPOINTMENT (OUTPATIENT)
Dept: GENERAL RADIOLOGY | Age: 62
DRG: 193 | End: 2017-12-27
Payer: COMMERCIAL

## 2017-12-27 ENCOUNTER — APPOINTMENT (OUTPATIENT)
Dept: INTERVENTIONAL RADIOLOGY/VASCULAR | Age: 62
DRG: 193 | End: 2017-12-27
Payer: COMMERCIAL

## 2017-12-27 LAB
ABSOLUTE EOS #: 0.2 K/UL (ref 0–0.4)
ABSOLUTE IMMATURE GRANULOCYTE: ABNORMAL K/UL (ref 0–0.3)
ABSOLUTE LYMPH #: 1.7 K/UL (ref 1–4.8)
ABSOLUTE MONO #: 0.6 K/UL (ref 0.2–0.8)
ALBUMIN SERPL-MCNC: 3.1 G/DL (ref 3.5–5.2)
ALBUMIN/GLOBULIN RATIO: ABNORMAL (ref 1–2.5)
ALP BLD-CCNC: 847 U/L (ref 40–129)
ALT SERPL-CCNC: 33 U/L (ref 5–41)
ANION GAP SERPL CALCULATED.3IONS-SCNC: 11 MMOL/L (ref 9–17)
AST SERPL-CCNC: 24 U/L
BASOPHILS # BLD: 0 % (ref 0–2)
BASOPHILS ABSOLUTE: 0 K/UL (ref 0–0.2)
BILIRUB SERPL-MCNC: 0.4 MG/DL (ref 0.3–1.2)
BILIRUBIN DIRECT: 0.12 MG/DL
BILIRUBIN, INDIRECT: 0.28 MG/DL (ref 0–1)
BUN BLDV-MCNC: 10 MG/DL (ref 8–23)
CALCIUM SERPL-MCNC: 8.4 MG/DL (ref 8.6–10.4)
CASE NUMBER:: NORMAL
CHLORIDE BLD-SCNC: 103 MMOL/L (ref 98–107)
CO2: 25 MMOL/L (ref 20–31)
CREAT SERPL-MCNC: 0.67 MG/DL (ref 0.7–1.2)
CULTURE: NORMAL
DIFFERENTIAL TYPE: ABNORMAL
DIRECT EXAM: NORMAL
EOSINOPHILS RELATIVE PERCENT: 3 % (ref 1–4)
GFR AFRICAN AMERICAN: >60 ML/MIN
GFR NON-AFRICAN AMERICAN: >60 ML/MIN
GFR SERPL CREATININE-BSD FRML MDRD: ABNORMAL ML/MIN/{1.73_M2}
GFR SERPL CREATININE-BSD FRML MDRD: ABNORMAL ML/MIN/{1.73_M2}
GLUCOSE BLD-MCNC: 105 MG/DL (ref 70–99)
HCT VFR BLD CALC: 37 % (ref 41–53)
HEMOGLOBIN: 12.7 G/DL (ref 13.5–17.5)
IMMATURE GRANULOCYTES: ABNORMAL %
LACTATE DEHYDROGENASE, FLUID: 139 U/L
LYMPHOCYTES # BLD: 18 % (ref 24–44)
Lab: NORMAL
MCH RBC QN AUTO: 29.7 PG (ref 26–34)
MCHC RBC AUTO-ENTMCNC: 34.2 G/DL (ref 31–37)
MCV RBC AUTO: 86.8 FL (ref 80–100)
MONOCYTES # BLD: 7 % (ref 1–7)
MYOGLOBIN: 22 NG/ML (ref 28–72)
PDW BLD-RTO: 13.4 % (ref 11.5–14.5)
PH FLUID: 8.2
PLATELET # BLD: 137 K/UL (ref 130–400)
PLATELET ESTIMATE: ABNORMAL
PMV BLD AUTO: ABNORMAL FL (ref 6–12)
POTASSIUM SERPL-SCNC: 3.7 MMOL/L (ref 3.7–5.3)
RBC # BLD: 4.26 M/UL (ref 4.5–5.9)
RBC # BLD: ABNORMAL 10*6/UL
SEG NEUTROPHILS: 72 % (ref 36–66)
SEGMENTED NEUTROPHILS ABSOLUTE COUNT: 6.7 K/UL (ref 1.8–7.7)
SODIUM BLD-SCNC: 139 MMOL/L (ref 135–144)
SPECIMEN DESCRIPTION: NORMAL
SPECIMEN TYPE: NORMAL
STATUS: NORMAL
TOTAL PROTEIN, BODY FLUID: 3.2 G/DL
TOTAL PROTEIN: 5.8 G/DL (ref 6.4–8.3)
TROPONIN INTERP: ABNORMAL
TROPONIN T: <0.03 NG/ML
WBC # BLD: 9.2 K/UL (ref 3.5–11)
WBC # BLD: ABNORMAL 10*3/UL

## 2017-12-27 PROCEDURE — 6370000000 HC RX 637 (ALT 250 FOR IP): Performed by: INTERNAL MEDICINE

## 2017-12-27 PROCEDURE — 2580000003 HC RX 258: Performed by: INTERNAL MEDICINE

## 2017-12-27 PROCEDURE — 1200000000 HC SEMI PRIVATE

## 2017-12-27 PROCEDURE — 82248 BILIRUBIN DIRECT: CPT

## 2017-12-27 PROCEDURE — 94760 N-INVAS EAR/PLS OXIMETRY 1: CPT

## 2017-12-27 PROCEDURE — 85025 COMPLETE CBC W/AUTO DIFF WBC: CPT

## 2017-12-27 PROCEDURE — 89051 BODY FLUID CELL COUNT: CPT

## 2017-12-27 PROCEDURE — 36415 COLL VENOUS BLD VENIPUNCTURE: CPT

## 2017-12-27 PROCEDURE — 87075 CULTR BACTERIA EXCEPT BLOOD: CPT

## 2017-12-27 PROCEDURE — 32555 ASPIRATE PLEURA W/ IMAGING: CPT | Performed by: RADIOLOGY

## 2017-12-27 PROCEDURE — 87102 FUNGUS ISOLATION CULTURE: CPT

## 2017-12-27 PROCEDURE — 83615 LACTATE (LD) (LDH) ENZYME: CPT

## 2017-12-27 PROCEDURE — 6360000002 HC RX W HCPCS: Performed by: FAMILY MEDICINE

## 2017-12-27 PROCEDURE — 87015 SPECIMEN INFECT AGNT CONCNTJ: CPT

## 2017-12-27 PROCEDURE — 87070 CULTURE OTHR SPECIMN AEROBIC: CPT

## 2017-12-27 PROCEDURE — 84157 ASSAY OF PROTEIN OTHER: CPT

## 2017-12-27 PROCEDURE — 2700000000 HC OXYGEN THERAPY PER DAY

## 2017-12-27 PROCEDURE — 88112 CYTOPATH CELL ENHANCE TECH: CPT

## 2017-12-27 PROCEDURE — 87206 SMEAR FLUORESCENT/ACID STAI: CPT

## 2017-12-27 PROCEDURE — 87116 MYCOBACTERIA CULTURE: CPT

## 2017-12-27 PROCEDURE — 84484 ASSAY OF TROPONIN QUANT: CPT

## 2017-12-27 PROCEDURE — 2500000003 HC RX 250 WO HCPCS: Performed by: RADIOLOGY

## 2017-12-27 PROCEDURE — 87205 SMEAR GRAM STAIN: CPT

## 2017-12-27 PROCEDURE — 94640 AIRWAY INHALATION TREATMENT: CPT

## 2017-12-27 PROCEDURE — 80053 COMPREHEN METABOLIC PANEL: CPT

## 2017-12-27 PROCEDURE — 6360000002 HC RX W HCPCS: Performed by: INTERNAL MEDICINE

## 2017-12-27 PROCEDURE — 83874 ASSAY OF MYOGLOBIN: CPT

## 2017-12-27 PROCEDURE — 6370000000 HC RX 637 (ALT 250 FOR IP): Performed by: FAMILY MEDICINE

## 2017-12-27 PROCEDURE — C1729 CATH, DRAINAGE: HCPCS

## 2017-12-27 PROCEDURE — 83986 ASSAY PH BODY FLUID NOS: CPT

## 2017-12-27 PROCEDURE — 0W9B3ZZ DRAINAGE OF LEFT PLEURAL CAVITY, PERCUTANEOUS APPROACH: ICD-10-PCS | Performed by: RADIOLOGY

## 2017-12-27 PROCEDURE — 71010 XR CHEST PORTABLE: CPT

## 2017-12-27 PROCEDURE — 88305 TISSUE EXAM BY PATHOLOGIST: CPT

## 2017-12-27 RX ORDER — LIDOCAINE HYDROCHLORIDE 10 MG/ML
INJECTION, SOLUTION EPIDURAL; INFILTRATION; INTRACAUDAL; PERINEURAL
Status: COMPLETED | OUTPATIENT
Start: 2017-12-27 | End: 2017-12-27

## 2017-12-27 RX ADMIN — MOMETASONE FUROATE AND FORMOTEROL FUMARATE DIHYDRATE 2 PUFF: 200; 5 AEROSOL RESPIRATORY (INHALATION) at 07:51

## 2017-12-27 RX ADMIN — MOMETASONE FUROATE AND FORMOTEROL FUMARATE DIHYDRATE 2 PUFF: 200; 5 AEROSOL RESPIRATORY (INHALATION) at 20:45

## 2017-12-27 RX ADMIN — ENOXAPARIN SODIUM 40 MG: 40 INJECTION SUBCUTANEOUS at 09:29

## 2017-12-27 RX ADMIN — Medication 10 ML: at 09:30

## 2017-12-27 RX ADMIN — CEFTRIAXONE SODIUM 1 G: 10 INJECTION, POWDER, FOR SOLUTION INTRAVENOUS at 20:48

## 2017-12-27 RX ADMIN — AZITHROMYCIN MONOHYDRATE 500 MG: 500 INJECTION, POWDER, LYOPHILIZED, FOR SOLUTION INTRAVENOUS at 18:27

## 2017-12-27 RX ADMIN — FENTANYL CITRATE 50 MCG: 50 INJECTION INTRAMUSCULAR; INTRAVENOUS at 21:26

## 2017-12-27 RX ADMIN — VARENICLINE TARTRATE 0.5 MG: 0.5 TABLET, FILM COATED ORAL at 18:30

## 2017-12-27 RX ADMIN — SODIUM CHLORIDE: 9 INJECTION, SOLUTION INTRAVENOUS at 06:26

## 2017-12-27 RX ADMIN — LIDOCAINE HYDROCHLORIDE 10 ML: 10 INJECTION, SOLUTION EPIDURAL; INFILTRATION; INTRACAUDAL; PERINEURAL at 08:43

## 2017-12-27 RX ADMIN — IPRATROPIUM BROMIDE AND ALBUTEROL SULFATE 1 AMPULE: .5; 3 SOLUTION RESPIRATORY (INHALATION) at 14:57

## 2017-12-27 RX ADMIN — IPRATROPIUM BROMIDE AND ALBUTEROL SULFATE 1 AMPULE: .5; 3 SOLUTION RESPIRATORY (INHALATION) at 07:49

## 2017-12-27 RX ADMIN — IPRATROPIUM BROMIDE AND ALBUTEROL SULFATE 1 AMPULE: .5; 3 SOLUTION RESPIRATORY (INHALATION) at 20:45

## 2017-12-27 RX ADMIN — FLUOXETINE 20 MG: 20 CAPSULE ORAL at 09:29

## 2017-12-27 RX ADMIN — VARENICLINE TARTRATE 0.5 MG: 0.5 TABLET, FILM COATED ORAL at 09:29

## 2017-12-27 ASSESSMENT — PAIN SCALES - GENERAL
PAINLEVEL_OUTOF10: 0
PAINLEVEL_OUTOF10: 5

## 2017-12-27 ASSESSMENT — PAIN DESCRIPTION - LOCATION: LOCATION: CHEST

## 2017-12-27 ASSESSMENT — PAIN DESCRIPTION - DESCRIPTORS: DESCRIPTORS: DISCOMFORT

## 2017-12-27 ASSESSMENT — PAIN DESCRIPTION - PAIN TYPE: TYPE: ACUTE PAIN

## 2017-12-27 ASSESSMENT — PAIN DESCRIPTION - FREQUENCY: FREQUENCY: INTERMITTENT

## 2017-12-27 ASSESSMENT — PAIN DESCRIPTION - ONSET: ONSET: ON-GOING

## 2017-12-27 ASSESSMENT — PAIN DESCRIPTION - ORIENTATION: ORIENTATION: RIGHT;LEFT

## 2017-12-27 NOTE — PROGRESS NOTES
Pulmonary Critical Care Progress Note    Patient seen for the follow up of Abnormal CT scan of the chest and shortness of breath    Subjective:    He denies chest pain. Mild occasional cough, mostly dry. Shortness of breath is improved. He has good appetite. He had left thoracentesis 250 ml of turbid orange fluid    Examination:    Vitals: /60   Pulse 109   Temp 97.5 °F (36.4 °C) (Oral)   Resp 16   Ht 6' 1\" (1.854 m)   Wt 230 lb (104.3 kg)   SpO2 94%   BMI 30.34 kg/m²   SpO2  Av.9 %  Min: 93 %  Max: 97 %  General appearance: alert and cooperative with exam  Neck: No JVD  Lungs: Decreased breath sounds no crackles or wheezing  Heart: regular rate and rhythm, S1, S2 normal, no gallop  Abdomen: Soft, non tender, + BS  Extremities: no cyanosis or clubbing. No significant edema    LABs:    CBC:   Recent Labs      17   1025  17   0454   WBC  10.1  9.2   HGB  13.2*  12.7*   HCT  38.7*  37.0*   PLT  132  137     BMP:   Recent Labs      17   1025  17   0621   NA  137  139   K  3.8  3.7   CO2  25  25   BUN  10  10   CREATININE  0.62*  0.67*   LABGLOM  >60  >60   GLUCOSE  126*  105*     PT/INR:   Recent Labs      17   1630   PROTIME  10.4   INR  1.0     APTT:  Recent Labs      17   1630   APTT  34.0*     LIVER PROFILE:  Recent Labs      17   1025  17   0621   AST  20  24   ALT  28  33   LABALBU  3.2*  3.1*     Color, Fluid NOT REPORTED     Appearance, Fluid NOT REPORTED    WBC, Fluid 407 /mm3    Comment: The reference range and other method performance specifications have not been    established for this body fluid.  The test result must be integrated into the    clinical context for interpretation.    Performed at [address] [phone]        RBC, Fluid 22,000 /mm3    Comment: The reference range and other method performance specifications have not been    established for this body fluid.  The test result must be integrated into the    clinical context for interpretation. Performed at [facility] [address] [place] [address] [phone]        Specimen Type . PLEURAL FLUID    Comment: Performed at [facility] [address], [address] [facility]    [phone]        Neutrophil Count, Fluid 6 %    Comment: The reference range and other method performance specifications have not been    established for this body fluid.  The test result must be integrated into the    clinical context for interpretation. Lymphocytes, Body Fluid 45 %    Comment: The reference range and other method performance specifications have not been    established for this body fluid.  The test result must be integrated into the    clinical context for interpretation. Performed at [facility] [phone]        Monocyte Count, Fluid NOT REPORTED %    Eos, Fluid NOT REPORTED %    Basos, Fluid NOT REPORTED %    Other Cells, Fluid Pending %    Fluid Diff Comment NOT REPORTED   PH, BODY FLUID [[id number]] Collected: 12/27/17 1124   Updated: 12/27/17 1453    Specimen Source: Pleura     Specimen Type . PLEURAL FLUID    Comment: Performed at [address] [facility] [address], [address] [facility]    [phone]        pH, Fluid 8.2    Comment: There are no normals for body fluid samples. Performed at [facility] [address] [address] [phone]       Protein, Body Fluid [[id number]] Collected: 12/27/17 1121   Updated: 12/27/17 1421     Specimen Type . PLEURAL FLUID    Comment: Performed at [address] [facility] [address], [address] [facility]    [phone]        Total Protein, Body Fluid 3.2 g/dL    Comment: There are no normals for body fluid samples. Radiology:    Chest x-ray   No evidence of pneumothorax status post left thoracentesis.   CT abdomen and pelvis  Diffuse osseous metastatic disease.       Prominent gastrohepatic lymph node is nonspecific.  Otherwise no definite   evidence of metastatic disease within the abdomen or pelvis.        Impression:    · Acute  respiratory failure  · Acute Exacerbation of COPD  · Interstitial lung disease  · Left lung peripheral masses/atelectasis/left effusion with pleuritic chest pain cannot exclude empyema  · Diffuse Osseus  Metastatic lesions per radiology  · smoker     Recommendations:    · O2 nasal cannula  · Multivalve before discharge  · DuoNeb by nebulizer  · Dulera 200  · Rocephin and Zithromax IV  · check pleural fluid studies  cultures and cytology  · Oncology on consult; Consider PET scan  · Smoking Cessation/Chantix   · DVT and peptic ulcer disease prophylaxis      Mima Catherine MD, [facility]  Pulmonary Critical Care and Sleep Medicine,  [facility]  Cell: [phone]  Office: [phone]

## 2017-12-27 NOTE — PROGRESS NOTES
No antibiotics noted for pt. Dr. Lilian Berman paged for antibiotic orders. Dr. Lilian Berman stated he will put orders in. Dr. Kelly Waldrop paged. Viraj Bruce they have fluid from pleural centesis, need lab orders. Awaiting call back.

## 2017-12-27 NOTE — FLOWSHEET NOTE
Patient expressed no major needs or worries. Patient states he is doing well. Patient states he is treated well. Patient grateful for the visit . Follow up as needed. 12/26/17 2100   Encounter Summary   Services provided to: Patient   Referral/Consult From: [facility] Family members   Continue Visiting (12-26-17)   Complexity of Encounter Low   Length of Encounter 15 minutes   Spiritual Assessment Completed Yes   Routine   Type Initial   Assessment Calm; Approachable   Intervention Explored feelings, thoughts, concerns;Sustaining presence/ Ministry of presence; Discussed illness/injury and it's impact; Discussed belief system/Congregational practices/alee   Outcome Expressed gratitude;Receptive;Engaged in conversation;Expressed feelings/needs/concerns

## 2017-12-27 NOTE — CONSULTS
ONCOLOGY NOTE    PCP: Neo Hickey MD  Referring Provider: No ref. provider found    VISIT DIAGNOSIS:  The encounter diagnosis was Pneumonia due to organism. STAGING:  No matching staging information was found for the patient. Patient Active Problem List    Diagnosis Date Noted    Pneumonia 12/25/2017       SUBJECTIVE/HPI:  Jemima Cali is a very pleasant 60 y.o. male who is Admitted through the emergency room with shortness of breath in migratory bilateral rib pain for 1-2 months. Evaluation with CT scan reveals bilateral small pleural effusions. He has undergone thoracentesis pleural effusion already. Results of this are pending. He denies fever cough or sputum production. CT scan also revealed interstitial changes etiology unknown. Dr. Edmundo Sesay has seen the patient and CT scan and pelvis has been performed without obvious cancer findings. Alarming is diffuse changes in most of his bones including rib vertebrae and pelvic bones consistent with potential metastatic disease. Alkaline phosphatase is markedly elevated. PSA is normal.  His pain is minimal compared to the bone findings seen on scans. He is a smoker planning on quitting soon in a  exposed to fumes. He has no weight loss blood in the stool or blood in the urine or urinary change. He has no change in bowel habits. He denies neurologic deficit.     PAST MEDICAL HISTORY:      Diagnosis Date    Chronic fatigue     COPD (chronic obstructive pulmonary disease) (HCC)     Depression        PAST SURGICAL HISTORY:      Procedure Laterality Date    APPENDECTOMY         CURRENT MEDICATIONS:   Current Facility-Administered Medications   Medication Dose Route Frequency Provider Last Rate Last Dose    ipratropium-albuterol (DUONEB) nebulizer solution 1 ampule  1 ampule Inhalation 4x daily Ayo Yeboah MD   1 ampule at 12/27/17 0749    sodium chloride flush 0.9 % injection 10 mL  10 mL Intravenous PRN Ayo Yeboah mass goiter or bruit. There is no skin metastases petechiae purpura. Lungs reveal diffuse rhonchi without rales or percussible effusions. There is minimal rib tenderness no back or leg tenderness or arm tenderness muscle skeletal findings. There are no palpable cords. Heart sounds are regular without obvious murmur or gallop. Abdomen is soft nontender without organomegaly or ascites. There is no costovertebral angle tenderness. There is no bladder fullness tenderness. Patient moves all 4 extremities well. There are no focal motor sensory deficits or tremor. Sensorium is intact. LABS:   Results for orders placed or performed during the hospital encounter of 12/25/17   Culture Blood #1   Result Value Ref Range    Specimen Description       . BLOOD 10ML LTA Performed at [address] [facility] [address] [facility]    Specimen Description  [place], [facility] [phone]     Special Requests NOT REPORTED     Culture NO GROWTH 2 DAYS     Culture       Performed at [facility] [address] [facility], [address] [place] [address] [phone]    Status Pending    Culture Blood #1   Result Value Ref Range    Specimen Description       . BLOOD RTAC CLP 12CC Performed at [address] [address] [facility]    Specimen Description  [place], [address] [facility] [phone]     Special Requests NOT REPORTED     Culture NO GROWTH 2 DAYS     Culture       Performed at [address] [facility], [address] [place] [address] [phone]    Status Pending    D-Dimer, Quantitative   Result Value Ref Range    D-Dimer, Quant 5.13 mg/L FEU   Troponin   Result Value Ref Range    Troponin T <0.03 <0.03 ng/mL    Troponin Interp         Basic Metabolic Panel   Result Value Ref Range    Glucose 94 70 - 99 mg/dL    BUN 13 8 - 23 mg/dL    CREATININE 0.72 0.70 - 1.20 mg/dL    Bun/Cre Ratio 18 9 - 20 Calcium 8.5 (L) 8.6 - 10.4 mg/dL    Sodium 140 135 - 144 mmol/L    Potassium 4.0 3.7 - 5.3 mmol/L    Chloride 102 98 - 107 mmol/L    CO2 24 20 - 31 mmol/L    Anion Gap 14 9 - 17 mmol/L    GFR Non-African American >60 >60 mL/min    GFR African American >60 >60 mL/min    GFR Comment          GFR Staging NOT REPORTED    Brain Natriuretic Peptide   Result Value Ref Range    Pro- <300 pg/mL    BNP Interpretation         CBC Auto Differential   Result Value Ref Range    WBC 13.4 (H) 3.5 - 11.0 k/uL    RBC 5.17 4.5 - 5.9 m/uL    Hemoglobin 15.2 13.5 - 17.5 g/dL    Hematocrit 44.8 41 - 53 %    MCV 86.8 80 - 100 fL    MCH 29.4 26 - 34 pg    MCHC 33.8 31 - 37 g/dL    RDW 14.2 11.5 - 14.5 %    Platelets 991 065 - 987 k/uL    MPV 8.3 6.0 - 12.0 fL    Differential Type NOT REPORTED     Immature Granulocytes NOT REPORTED 0 %    Absolute Immature Granulocyte NOT REPORTED 0.00 - 0.30 k/uL    WBC Morphology NOT REPORTED     RBC Morphology NOT REPORTED     Platelet Estimate NOT REPORTED     Seg Neutrophils 75 (H) 36 - 66 %    Lymphocytes 15 (L) 24 - 44 %    Monocytes 7 1 - 7 %    Eosinophils % 2 1 - 4 %    Basophils 1 0 - 2 %    Segs Absolute 10.00 (H) 1.8 - 7.7 k/uL    Absolute Lymph # 2.00 1.0 - 4.8 k/uL    Absolute Mono # 1.00 (H) 0.2 - 0.8 k/uL    Absolute Eos # 0.30 0.0 - 0.4 k/uL    Basophils # 0.10 0.0 - 0.2 k/uL   Hepatic Function Panel   Result Value Ref Range    Alb 3.8 3.5 - 5.2 g/dL    Alkaline Phosphatase 1,093 (H) 40 - 129 U/L    ALT 32 5 - 41 U/L    AST 24 <40 U/L    Total Bilirubin 0.40 0.3 - 1.2 mg/dL    Bilirubin, Direct 0.12 <0.31 mg/dL    Bilirubin, Indirect 0.28 0.00 - 1.00 mg/dL    Total Protein 7.0 6.4 - 8.3 g/dL    Globulin NOT REPORTED 1.5 - 3.8 g/dL    Albumin/Globulin Ratio NOT REPORTED 1.0 - 2.5   CBC with DIFF   Result Value Ref Range    WBC 10.1 3.5 - 11.0 k/uL    RBC 4.52 4.5 - 5.9 m/uL    Hemoglobin 13.2 (L) 13.5 - 17.5 g/dL    Hematocrit 38.7 (L) 41 - 53 %    MCV 85.6 80 - 100 fL    MCH 29.1 26 - 34 pg    MCHC 34.0 31 - 37 g/dL    RDW 13.1 11.5 - 14.5 %    Platelets 858 899 - 699 k/uL    MPV NOT REPORTED 6.0 - 12.0 fL    Differential Type NOT REPORTED     Immature Granulocytes NOT REPORTED 0 %    Absolute Immature Granulocyte NOT REPORTED 0.00 - 0.30 k/uL    WBC Morphology NOT REPORTED     RBC Morphology NOT REPORTED     Platelet Estimate NOT REPORTED     Seg Neutrophils 78 (H) 36 - 66 %    Lymphocytes 14 (L) 24 - 44 %    Monocytes 6 1 - 7 %    Eosinophils % 2 1 - 4 %    Basophils 0 0 - 2 %    Segs Absolute 7.90 (H) 1.8 - 7.7 k/uL    Absolute Lymph # 1.40 1.0 - 4.8 k/uL    Absolute Mono # 0.60 0.2 - 0.8 k/uL    Absolute Eos # 0.20 0.0 - 0.4 k/uL    Basophils # 0.00 0.0 - 0.2 k/uL   Comp Metab w/Bili Pr   Result Value Ref Range    Alb 3.2 (L) 3.5 - 5.2 g/dL    Albumin/Globulin Ratio NOT REPORTED 1.0 - 2.5    Alkaline Phosphatase 870 (H) 40 - 129 U/L    ALT 28 5 - 41 U/L    AST 20 <40 U/L    Total Bilirubin 0.53 0.3 - 1.2 mg/dL    Bilirubin, Direct 0.19 <0.31 mg/dL    Bilirubin, Indirect 0.34 0.00 - 1.00 mg/dL    BUN 10 8 - 23 mg/dL    Calcium 8.3 (L) 8.6 - 10.4 mg/dL    CREATININE 0.62 (L) 0.70 - 1.20 mg/dL    Glucose 126 (H) 70 - 99 mg/dL    Total Protein 6.0 (L) 6.4 - 8.3 g/dL    Sodium 137 135 - 144 mmol/L    Potassium 3.8 3.7 - 5.3 mmol/L    Chloride 101 98 - 107 mmol/L    CO2 25 20 - 31 mmol/L    Anion Gap 11 9 - 17 mmol/L    GFR Non-African American >60 >60 mL/min    GFR African American >60 >60 mL/min    GFR Comment          GFR Staging NOT REPORTED    PSA, Diagnostic   Result Value Ref Range    PSA 1.68 <4.1 ug/L   PT   Result Value Ref Range    Protime 10.4 9.7 - 11.6 sec    INR 1.0    APTT   Result Value Ref Range    PTT 34.0 (H) 23 - 31 sec   CBC Auto Differential   Result Value Ref Range    WBC 9.2 3.5 - 11.0 k/uL    RBC 4.26 (L) 4.5 - 5.9 m/uL    Hemoglobin 12.7 (L) 13.5 - 17.5 g/dL    Hematocrit 37.0 (L) 41 - 53 %    MCV 86.8 80 - 100 fL    MCH 29.7 26 - 34 pg    MCHC 34.2 31 - 37 g/dL    RDW scan of chest reveals no obvious pulmonary embolus. Again there are findings consistent with diffuse osseous metastatic disease. There could be a pathologic fracture the inferior endplate of T-2. There is diffuse interstitial changes throughout both lungs with groundglass opacity is scattered nodularity. There is prominent left hilar soft tissue left hilar lymphadenopathy and right paratracheal lymph node measuring 1.9 x 1.4 cm. IMPRESSION:     1. Pneumonia due to organism    2. Interstitial lung changes on CT scan malignancy versus other. 3.  Diffuse changes throughout bones consistent with metastatic disease. 4.  Marked elevation in alkaline phosphatase with normal PSA consistent with bony metastatic disease likely to be secondary to prostate cancer given normal PSA level. Patient Active Problem List   Diagnosis    Pneumonia       PLAN:     1. Await cytology from procedure performed. 2. Follow up with Me in the Office in the Next Week to Review Cytology. If Positive Further Workup Such As PET Scan May Be Required or MRI Scan of the Brain but This Remains to Be Seen. If Negative Prostate Examination of Bone Marrow Testing Would Be Performed in the Office. 3.   4. Above Plan Discussed with Patient and Sister in the Room All Questions Been Asked and Answered.       Electronically signed by Cali Murrell MD on 12/27/2017 at 1:36 PM

## 2017-12-28 LAB
ABSOLUTE EOS #: 0.3 K/UL (ref 0–0.4)
ABSOLUTE IMMATURE GRANULOCYTE: ABNORMAL K/UL (ref 0–0.3)
ABSOLUTE LYMPH #: 1.8 K/UL (ref 1–4.8)
ABSOLUTE MONO #: 0.6 K/UL (ref 0.2–0.8)
ANION GAP SERPL CALCULATED.3IONS-SCNC: 10 MMOL/L (ref 9–17)
APPEARANCE FLUID: NORMAL
BASO FLUID: NORMAL %
BASOPHILS # BLD: 1 % (ref 0–2)
BASOPHILS ABSOLUTE: 0.1 K/UL (ref 0–0.2)
BUN BLDV-MCNC: 10 MG/DL (ref 8–23)
BUN/CREAT BLD: 20 (ref 9–20)
CALCIUM SERPL-MCNC: 8.4 MG/DL (ref 8.6–10.4)
CHLORIDE BLD-SCNC: 101 MMOL/L (ref 98–107)
CO2: 27 MMOL/L (ref 20–31)
COLOR FLUID: NORMAL
CREAT SERPL-MCNC: 0.49 MG/DL (ref 0.7–1.2)
DIFFERENTIAL TYPE: ABNORMAL
EOSINOPHIL FLUID: NORMAL %
EOSINOPHILS RELATIVE PERCENT: 3 % (ref 1–4)
FLUID DIFF COMMENT: NORMAL
GFR AFRICAN AMERICAN: >60 ML/MIN
GFR NON-AFRICAN AMERICAN: >60 ML/MIN
GFR SERPL CREATININE-BSD FRML MDRD: ABNORMAL ML/MIN/{1.73_M2}
GFR SERPL CREATININE-BSD FRML MDRD: ABNORMAL ML/MIN/{1.73_M2}
GLUCOSE BLD-MCNC: 105 MG/DL (ref 70–99)
HCT VFR BLD CALC: 37.7 % (ref 41–53)
HEMOGLOBIN: 12.7 G/DL (ref 13.5–17.5)
IMMATURE GRANULOCYTES: ABNORMAL %
LV EF: 65 %
LVEF MODALITY: NORMAL
LYMPHOCYTES # BLD: 18 % (ref 24–44)
LYMPHOCYTES, BODY FLUID: 45 %
MCH RBC QN AUTO: 29 PG (ref 26–34)
MCHC RBC AUTO-ENTMCNC: 33.8 G/DL (ref 31–37)
MCV RBC AUTO: 85.9 FL (ref 80–100)
MONOCYTE, FLUID: NORMAL %
MONOCYTES # BLD: 6 % (ref 1–7)
MYOGLOBIN: 21 NG/ML (ref 28–72)
MYOGLOBIN: <21 NG/ML (ref 28–72)
NEUTROPHIL, FLUID: 6 %
OTHER CELLS FLUID: NORMAL %
PDW BLD-RTO: 13.3 % (ref 11.5–14.5)
PLATELET # BLD: 144 K/UL (ref 130–400)
PLATELET ESTIMATE: ABNORMAL
PMV BLD AUTO: ABNORMAL FL (ref 6–12)
POTASSIUM SERPL-SCNC: 4 MMOL/L (ref 3.7–5.3)
RBC # BLD: 4.39 M/UL (ref 4.5–5.9)
RBC # BLD: ABNORMAL 10*6/UL
RBC FLUID: NORMAL /MM3
SEG NEUTROPHILS: 72 % (ref 36–66)
SEGMENTED NEUTROPHILS ABSOLUTE COUNT: 7.1 K/UL (ref 1.8–7.7)
SODIUM BLD-SCNC: 138 MMOL/L (ref 135–144)
SPECIMEN TYPE: NORMAL
SURGICAL PATHOLOGY REPORT: NORMAL
TROPONIN INTERP: ABNORMAL
TROPONIN INTERP: ABNORMAL
TROPONIN T: <0.03 NG/ML
TROPONIN T: <0.03 NG/ML
WBC # BLD: 9.9 K/UL (ref 3.5–11)
WBC # BLD: ABNORMAL 10*3/UL
WBC FLUID: 407 /MM3

## 2017-12-28 PROCEDURE — 1200000000 HC SEMI PRIVATE

## 2017-12-28 PROCEDURE — 36415 COLL VENOUS BLD VENIPUNCTURE: CPT

## 2017-12-28 PROCEDURE — 93306 TTE W/DOPPLER COMPLETE: CPT

## 2017-12-28 PROCEDURE — 85025 COMPLETE CBC W/AUTO DIFF WBC: CPT

## 2017-12-28 PROCEDURE — 6360000002 HC RX W HCPCS: Performed by: INTERNAL MEDICINE

## 2017-12-28 PROCEDURE — 6370000000 HC RX 637 (ALT 250 FOR IP): Performed by: FAMILY MEDICINE

## 2017-12-28 PROCEDURE — 2700000000 HC OXYGEN THERAPY PER DAY

## 2017-12-28 PROCEDURE — 2500000003 HC RX 250 WO HCPCS: Performed by: INTERNAL MEDICINE

## 2017-12-28 PROCEDURE — 80048 BASIC METABOLIC PNL TOTAL CA: CPT

## 2017-12-28 PROCEDURE — 94640 AIRWAY INHALATION TREATMENT: CPT

## 2017-12-28 PROCEDURE — 2580000003 HC RX 258: Performed by: INTERNAL MEDICINE

## 2017-12-28 PROCEDURE — 6370000000 HC RX 637 (ALT 250 FOR IP): Performed by: INTERNAL MEDICINE

## 2017-12-28 PROCEDURE — 94760 N-INVAS EAR/PLS OXIMETRY 1: CPT

## 2017-12-28 RX ORDER — METHYLPREDNISOLONE SODIUM SUCCINATE 40 MG/ML
40 INJECTION, POWDER, LYOPHILIZED, FOR SOLUTION INTRAMUSCULAR; INTRAVENOUS EVERY 8 HOURS
Status: DISCONTINUED | OUTPATIENT
Start: 2017-12-28 | End: 2017-12-29

## 2017-12-28 RX ADMIN — MOMETASONE FUROATE AND FORMOTEROL FUMARATE DIHYDRATE 2 PUFF: 200; 5 AEROSOL RESPIRATORY (INHALATION) at 20:29

## 2017-12-28 RX ADMIN — VARENICLINE TARTRATE 0.5 MG: 0.5 TABLET, FILM COATED ORAL at 08:27

## 2017-12-28 RX ADMIN — METHYLPREDNISOLONE SODIUM SUCCINATE 40 MG: 40 INJECTION, POWDER, FOR SOLUTION INTRAMUSCULAR; INTRAVENOUS at 18:17

## 2017-12-28 RX ADMIN — ENOXAPARIN SODIUM 40 MG: 40 INJECTION SUBCUTANEOUS at 08:27

## 2017-12-28 RX ADMIN — VARENICLINE TARTRATE 0.5 MG: 0.5 TABLET, FILM COATED ORAL at 17:27

## 2017-12-28 RX ADMIN — IPRATROPIUM BROMIDE AND ALBUTEROL SULFATE 1 AMPULE: .5; 3 SOLUTION RESPIRATORY (INHALATION) at 07:32

## 2017-12-28 RX ADMIN — IPRATROPIUM BROMIDE AND ALBUTEROL SULFATE 1 AMPULE: .5; 3 SOLUTION RESPIRATORY (INHALATION) at 11:19

## 2017-12-28 RX ADMIN — Medication 10 ML: at 21:14

## 2017-12-28 RX ADMIN — PANTOPRAZOLE SODIUM 40 MG: 40 TABLET, DELAYED RELEASE ORAL at 05:06

## 2017-12-28 RX ADMIN — FLUOXETINE 20 MG: 20 CAPSULE ORAL at 08:27

## 2017-12-28 RX ADMIN — MOMETASONE FUROATE AND FORMOTEROL FUMARATE DIHYDRATE 2 PUFF: 200; 5 AEROSOL RESPIRATORY (INHALATION) at 07:32

## 2017-12-28 RX ADMIN — Medication 10 ML: at 08:28

## 2017-12-28 RX ADMIN — IPRATROPIUM BROMIDE AND ALBUTEROL SULFATE 1 AMPULE: .5; 3 SOLUTION RESPIRATORY (INHALATION) at 15:24

## 2017-12-28 RX ADMIN — AZITHROMYCIN MONOHYDRATE 500 MG: 500 INJECTION, POWDER, LYOPHILIZED, FOR SOLUTION INTRAVENOUS at 17:27

## 2017-12-28 RX ADMIN — CEFTRIAXONE SODIUM 1 G: 10 INJECTION, POWDER, FOR SOLUTION INTRAVENOUS at 21:14

## 2017-12-28 RX ADMIN — IPRATROPIUM BROMIDE AND ALBUTEROL SULFATE 1 AMPULE: .5; 3 SOLUTION RESPIRATORY (INHALATION) at 20:29

## 2017-12-28 ASSESSMENT — PAIN SCALES - GENERAL: PAINLEVEL_OUTOF10: 0

## 2017-12-28 NOTE — PROGRESS NOTES
[facility] Oncology Progress Note  12/28/2017 2:13 PM  Subjective:   Admit Date: 12/25/2017  PCP: Shiela Vidal MD  Interval History: Follow-up in a patient with multiple lesions in the bones consistent with potential metastatic cancer. Interstitial findings found in lung potentially cancer but not yet proven. There is no clinical change and patient today and expected discharge tomorrow. He has minimal to no pain. Fluid cytology on thoracentesis is negative for malignancy. Diet: DIET GENERAL;  Medications:   Scheduled Meds:   azithromycin  500 mg Intravenous Q24H    cefTRIAXone (ROCEPHIN) IV  1 g Intravenous Q24H    ipratropium-albuterol  1 ampule Inhalation 4x daily    sodium chloride flush  10 mL Intravenous 2 times per day    enoxaparin  40 mg Subcutaneous Daily    FLUoxetine  20 mg Oral Daily    mometasone-formoterol  2 puff Inhalation Q12H    pantoprazole  40 mg Oral QAM AC    varenicline  0.5 mg Oral BID WC     Continuous Infusions:   CBC:   Recent Labs      12/26/17   1025  12/27/17   0454  12/28/17   0703   WBC  10.1  9.2  9.9   HGB  13.2*  12.7*  12.7*   PLT  132  137  144     BMP:    Recent Labs      12/26/17   1025  12/27/17   0621  12/28/17   0703   NA  137  139  138   K  3.8  3.7  4.0   CL  101  103  101   CO2  25  25  27   BUN  10  10  10   CREATININE  0.62*  0.67*  0.49*   GLUCOSE  126*  105*  105*     Hepatic:   Recent Labs      12/26/17   1025  12/27/17   0621   AST  20  24   ALT  28  33   BILITOT  0.53  0.40   ALKPHOS  870*  847*     INR:   Recent Labs      12/26/17   1630   INR  1.0       Objective:   Vitals: BP (!) 144/74   Pulse 92   Temp 97.9 °F (36.6 °C) (Oral)   Resp 16   Ht 6' 1\" (1.854 m)   Wt 230 lb (104.3 kg)   SpO2 98%   BMI 30.34 kg/m²   General appearance: alert and cooperative with exam  HEENT: Head: Normocephalic, no lesions, without obvious abnormality.   Neck: no adenopathy  Lungs: clear to auscultation bilaterally  Heart: regular rate and rhythm,

## 2017-12-28 NOTE — PLAN OF CARE
Problem: Falls - Risk of  Goal: Absence of falls  Outcome: Ongoing      Problem: Discharge Planning:  Goal: Discharged to appropriate level of care  Discharged to appropriate level of care   Outcome: Ongoing    Goal: Participates in care planning  Participates in care planning   Outcome: Ongoing      Problem: Fluid Volume - Deficit:  Goal: Achieves intake and output within specified parameters  Achieves intake and output within specified parameters   Outcome: Ongoing      Problem: Gas Exchange - Impaired:  Goal: Levels of oxygenation will improve  Levels of oxygenation will improve   Outcome: Ongoing      Problem: Hyperthermia:  Goal: Ability to maintain a body temperature in the normal range will improve  Ability to maintain a body temperature in the normal range will improve   Outcome: Ongoing      Problem: Pain:  Goal: Pain level will decrease  Pain level will decrease   Outcome: Ongoing    Goal: Control of acute pain  Control of acute pain   Outcome: Ongoing    Goal: Control of chronic pain  Control of chronic pain   Outcome: Ongoing

## 2017-12-28 NOTE — PROGRESS NOTES
Learning About the Safe Use of Antibiotics  Introduction  Antibiotics are drugs used to kill bacteria. Bacteria can cause infections. These include strep throat, ear infections, and pneumonia. These medicines can't cure everything. They don't kill viruses or help with allergies. They don't help illnesses such as the common cold, the flu, or a runny nose. And they can cause side effects. There are many types of antibiotics. Your doctor will decide which one will work best for your infection. Examples include:  · Amoxicillin. · Cephalexin (Keflex). · Ciprofloxacin (Cipro). What are the possible side effects? Side effects can include:  · Nausea. · Diarrhea. · Skin rash. · Yeast infection. · A severe allergic reaction. It may cause itching, swelling, and breathing problems. This is rare. You may have other side effects or reactions not listed here. Check the information that comes with your medicine. Should you take antibiotics just in case? Don't take antibiotics when you don't need them. If you do that, they may not work when you do need them. Each time you take antibiotics, you are more likely to have some bacteria that survive and aren't killed by the medicine. Bacteria that don't die can change and become even harder to kill. These are called antibiotic-resistant bacteria. They can cause longer and more serious infections. To treat them, you may need different, stronger antibiotics that have more side effects and may cost more. So always ask your doctor if antibiotics are the best treatment. Explain that you do not want antibiotics unless you need them. Help protect the community  Using antibiotics when they're not needed leads to the development of antibiotic-resistant bacteria. These tougher bacteria can spread to family members, children, and coworkers. People in your community will have a risk of getting an infection that is harder to cure and that costs more to treat.   How can you take effects of the drug and do not get any benefit from it. Moreover, taking an antibiotic when it is not needed can lead to the development of antibiotic resistance. When resistance develops, antibiotics may not be able to stop future infections. Every time someone takes an antibiotic they dont need, they increase their risk of developing a resistant infection in the future. Types of Adverse Drug Events Related to Antibiotics  Allergic Reactions  Every year, there are more than 140,000 emergency department visits for reactions to antibiotics. Almost four out of five (79%) emergency department visits for antibiotic-related adverse drug events are due to an allergic reaction. These reactions can range from mild rashes and itching to serious blistering skin reactions swelling of the face and throat, and breathing problems. Minimizing unnecessary antibiotic use is the best way to reduce the risk of adverse drug events from antibiotics. Patients should tell their doctors about any past drug reactions or allergies. C. difficile  C. difficile causes diarrhea linked to at least 14,000 [place] deaths each year. When a person takes antibiotics, good bacteria that protect against infection are destroyed for several months. During this time, patients can get sick from C. difficile picked up from contaminated surfaces or spread from a healthcare providers hands. Those most at risk are people, especially older adults, who take antibiotics and also get medical care. Take antibiotics exactly and only as prescribed. Drug Interactions and Side Effects  Antibiotics can interact with other drugs patients take, making those drugs or the antibiotics less effective. Some drug combinations can worsen the side effects of the antibiotic or other drug. Common side effects of antibiotics include nausea, diarrhea, and stomach pain. Sometimes these symptoms can lead to dehydration and other problems.  Patients should ask their doctors

## 2017-12-28 NOTE — PROGRESS NOTES
Follow-up pneumonia  Less cough and less shortness of breath no PND no orthopnea no tachypnea no fever no chills  System review  No fever no chills, no GI/ complaints, no cardiac complaints no TIA bleeding no headache no sore throat no sinus pain no polyuria no polydipsia no hypoglycemia  Vitals stable  Eyes no pallor no jaundice   neck supple no JVD no bruit  Lungs air entry equal bilateral rhonchi and mild expiratory wheezing no crackles no use of intercostals  Heart regular rate and rhythm no gallop  Abdomen soft positive bowel sounds without any tenderness without any megaly  Extremities good pulses without edema negative Homans sign  Neuro alert and oriented ×3 with no focal deficits  Assessment and plan  Pneumonia continue with the IV antibiotics  COPD exacerbation started decreasing IV steroids continue the bronchodilators and oxygen  Possible lung and bone metastasis  Pleural effusion negative for malignancy as per oncology probably will do a bone marrow as an outpatient , and pet ct elevated alkaline phosphatase  Meds labs reviewed  continue with DVT prophylaxis continue with the antibioticsadd  steroids physical therapy occupational therapy ambulate see orders

## 2017-12-28 NOTE — PROGRESS NOTES
Pulmonary Critical Care Progress Note    Patient seen for the follow up of Abnormal CT scan of the chest and shortness of breath    Subjective:    He has improved  chest pain. Mild occasional cough, mostly dry. Shortness of breath is improved. He has good appetite. Examination:    Vitals: /76   Pulse 84   Temp 97.4 °F (36.3 °C) (Oral)   Resp 18   Ht 6' 1\" (1.854 m)   Wt 230 lb (104.3 kg)   SpO2 96%   BMI 30.34 kg/m²   SpO2  Av.9 %  Min: 93 %  Max: 96 %  General appearance: alert and cooperative with exam  Neck: No JVD  Lungs: Decreased breath sounds no crackles or wheezing  Heart: regular rate and rhythm, S1, S2 normal, no gallop  Abdomen: Soft, non tender, + BS  Extremities: no cyanosis or clubbing. No significant edema    LABs:    CBC:   Recent Labs      17   0454  17   0703   WBC  9.2  9.9   HGB  12.7*  12.7*   HCT  37.0*  37.7*   PLT  137  144     BMP:   Recent Labs      17   0621  17   0703   NA  139  138   K  3.7  4.0   CO2  25  27   BUN  10  10   CREATININE  0.67*  0.49*   LABGLOM  >60  >60   GLUCOSE  105*  105*     PT/INR:   Recent Labs      17   1630   PROTIME  10.4   INR  1.0     APTT:  Recent Labs      17   1630   APTT  34.0*     LIVER PROFILE:  Recent Labs      17   1025  17   0621   AST  20  24   ALT  28  33   LABALBU  3.2*  3.1*         Radiology:    Chest x-ray   No evidence of pneumothorax status post left thoracentesis. CT abdomen and pelvis  Diffuse osseous metastatic disease.       Prominent gastrohepatic lymph node is nonspecific.  Otherwise no definite   evidence of metastatic disease within the abdomen or pelvis.        Impression:    · Acute  respiratory failure with hypoxia requiring oxygen supplementation and difficulty breathing on presentation  · Acute Exacerbation of COPD  · Interstitial lung disease  · Left lung peripheral masses/atelectasis/left effusion with pleuritic chest pain cannot exclude empyema  · Diffuse

## 2017-12-29 ENCOUNTER — APPOINTMENT (OUTPATIENT)
Dept: GENERAL RADIOLOGY | Age: 62
DRG: 193 | End: 2017-12-29
Payer: COMMERCIAL

## 2017-12-29 VITALS
SYSTOLIC BLOOD PRESSURE: 126 MMHG | RESPIRATION RATE: 18 BRPM | WEIGHT: 230 LBS | DIASTOLIC BLOOD PRESSURE: 65 MMHG | BODY MASS INDEX: 30.48 KG/M2 | HEART RATE: 90 BPM | HEIGHT: 73 IN | TEMPERATURE: 97.7 F | OXYGEN SATURATION: 95 %

## 2017-12-29 PROBLEM — R74.8 ELEVATED ALKALINE PHOSPHATASE LEVEL: Status: ACTIVE | Noted: 2017-12-29

## 2017-12-29 LAB
ABSOLUTE EOS #: 0.2 K/UL (ref 0–0.4)
ABSOLUTE IMMATURE GRANULOCYTE: ABNORMAL K/UL (ref 0–0.3)
ABSOLUTE LYMPH #: 1.5 K/UL (ref 1–4.8)
ABSOLUTE MONO #: 0.3 K/UL (ref 0.2–0.8)
ANION GAP SERPL CALCULATED.3IONS-SCNC: 10 MMOL/L (ref 9–17)
BASOPHILS # BLD: 1 % (ref 0–2)
BASOPHILS ABSOLUTE: 0.1 K/UL (ref 0–0.2)
BUN BLDV-MCNC: 12 MG/DL (ref 8–23)
BUN/CREAT BLD: 23 (ref 9–20)
CALCIUM SERPL-MCNC: 8.8 MG/DL (ref 8.6–10.4)
CHLORIDE BLD-SCNC: 101 MMOL/L (ref 98–107)
CO2: 28 MMOL/L (ref 20–31)
CREAT SERPL-MCNC: 0.52 MG/DL (ref 0.7–1.2)
DIFFERENTIAL TYPE: ABNORMAL
EOSINOPHILS RELATIVE PERCENT: 1 % (ref 1–4)
GFR AFRICAN AMERICAN: >60 ML/MIN
GFR NON-AFRICAN AMERICAN: >60 ML/MIN
GFR SERPL CREATININE-BSD FRML MDRD: ABNORMAL ML/MIN/{1.73_M2}
GFR SERPL CREATININE-BSD FRML MDRD: ABNORMAL ML/MIN/{1.73_M2}
GLUCOSE BLD-MCNC: 154 MG/DL (ref 70–99)
HCT VFR BLD CALC: 40 % (ref 41–53)
HEMOGLOBIN: 13.3 G/DL (ref 13.5–17.5)
IMMATURE GRANULOCYTES: ABNORMAL %
LYMPHOCYTES # BLD: 13 % (ref 24–44)
MCH RBC QN AUTO: 29 PG (ref 26–34)
MCHC RBC AUTO-ENTMCNC: 33.2 G/DL (ref 31–37)
MCV RBC AUTO: 87.4 FL (ref 80–100)
MONOCYTES # BLD: 3 % (ref 1–7)
PDW BLD-RTO: 13 % (ref 11.5–14.5)
PLATELET # BLD: 157 K/UL (ref 130–400)
PLATELET ESTIMATE: ABNORMAL
PMV BLD AUTO: ABNORMAL FL (ref 6–12)
POTASSIUM SERPL-SCNC: 4.3 MMOL/L (ref 3.7–5.3)
RBC # BLD: 4.58 M/UL (ref 4.5–5.9)
RBC # BLD: ABNORMAL 10*6/UL
SEG NEUTROPHILS: 82 % (ref 36–66)
SEGMENTED NEUTROPHILS ABSOLUTE COUNT: 9.5 K/UL (ref 1.8–7.7)
SODIUM BLD-SCNC: 139 MMOL/L (ref 135–144)
WBC # BLD: 11.6 K/UL (ref 3.5–11)
WBC # BLD: ABNORMAL 10*3/UL

## 2017-12-29 PROCEDURE — 85025 COMPLETE CBC W/AUTO DIFF WBC: CPT

## 2017-12-29 PROCEDURE — 80048 BASIC METABOLIC PNL TOTAL CA: CPT

## 2017-12-29 PROCEDURE — 71010 XR CHEST LIMITED: CPT

## 2017-12-29 PROCEDURE — 97116 GAIT TRAINING THERAPY: CPT

## 2017-12-29 PROCEDURE — 97530 THERAPEUTIC ACTIVITIES: CPT

## 2017-12-29 PROCEDURE — 94760 N-INVAS EAR/PLS OXIMETRY 1: CPT

## 2017-12-29 PROCEDURE — 2580000003 HC RX 258: Performed by: INTERNAL MEDICINE

## 2017-12-29 PROCEDURE — 97110 THERAPEUTIC EXERCISES: CPT

## 2017-12-29 PROCEDURE — 6370000000 HC RX 637 (ALT 250 FOR IP): Performed by: INTERNAL MEDICINE

## 2017-12-29 PROCEDURE — 2700000000 HC OXYGEN THERAPY PER DAY

## 2017-12-29 PROCEDURE — 36415 COLL VENOUS BLD VENIPUNCTURE: CPT

## 2017-12-29 PROCEDURE — 90732 PPSV23 VACC 2 YRS+ SUBQ/IM: CPT | Performed by: INTERNAL MEDICINE

## 2017-12-29 PROCEDURE — G8978 MOBILITY CURRENT STATUS: HCPCS

## 2017-12-29 PROCEDURE — 6360000002 HC RX W HCPCS: Performed by: INTERNAL MEDICINE

## 2017-12-29 PROCEDURE — G0009 ADMIN PNEUMOCOCCAL VACCINE: HCPCS | Performed by: INTERNAL MEDICINE

## 2017-12-29 PROCEDURE — 97161 PT EVAL LOW COMPLEX 20 MIN: CPT

## 2017-12-29 PROCEDURE — 6370000000 HC RX 637 (ALT 250 FOR IP): Performed by: FAMILY MEDICINE

## 2017-12-29 PROCEDURE — 94640 AIRWAY INHALATION TREATMENT: CPT

## 2017-12-29 PROCEDURE — G8979 MOBILITY GOAL STATUS: HCPCS

## 2017-12-29 RX ORDER — AZITHROMYCIN 250 MG/1
TABLET, FILM COATED ORAL
Qty: 1 PACKET | Refills: 0 | Status: SHIPPED | OUTPATIENT
Start: 2017-12-29 | End: 2018-01-08

## 2017-12-29 RX ORDER — PREDNISONE 10 MG/1
20 TABLET ORAL DAILY
Qty: 6 TABLET | Refills: 0 | Status: SHIPPED | OUTPATIENT
Start: 2017-12-29 | End: 2018-01-01

## 2017-12-29 RX ORDER — PREDNISONE 20 MG/1
30 TABLET ORAL DAILY
Qty: 5 TABLET | Refills: 0 | Status: SHIPPED | OUTPATIENT
Start: 2017-12-29 | End: 2018-01-01

## 2017-12-29 RX ORDER — PREDNISONE 1 MG/1
10 TABLET ORAL
Qty: 3 TABLET | Refills: 0 | Status: SHIPPED | OUTPATIENT
Start: 2017-12-29 | End: 2018-01-03

## 2017-12-29 RX ORDER — AZITHROMYCIN 250 MG/1
250 TABLET, FILM COATED ORAL DAILY
Status: DISCONTINUED | OUTPATIENT
Start: 2017-12-29 | End: 2017-12-29 | Stop reason: HOSPADM

## 2017-12-29 RX ORDER — PREDNISONE 10 MG/1
10 TABLET ORAL DAILY
Qty: 3 TABLET | Refills: 0 | Status: SHIPPED | OUTPATIENT
Start: 2017-12-29 | End: 2018-01-01

## 2017-12-29 RX ORDER — CEFUROXIME AXETIL 500 MG/1
500 TABLET ORAL 2 TIMES DAILY
Qty: 10 TABLET | Refills: 0 | Status: SHIPPED | OUTPATIENT
Start: 2017-12-29 | End: 2018-01-03

## 2017-12-29 RX ORDER — CEFUROXIME AXETIL 500 MG/1
500 TABLET ORAL EVERY 12 HOURS SCHEDULED
Status: DISCONTINUED | OUTPATIENT
Start: 2017-12-29 | End: 2017-12-29 | Stop reason: HOSPADM

## 2017-12-29 RX ADMIN — PNEUMOCOCCAL VACCINE POLYVALENT 0.5 ML
25; 25; 25; 25; 25; 25; 25; 25; 25; 25; 25; 25; 25; 25; 25; 25; 25; 25; 25; 25; 25; 25; 25 INJECTION, SOLUTION INTRAMUSCULAR; SUBCUTANEOUS at 08:27

## 2017-12-29 RX ADMIN — IPRATROPIUM BROMIDE AND ALBUTEROL SULFATE 1 AMPULE: .5; 3 SOLUTION RESPIRATORY (INHALATION) at 14:33

## 2017-12-29 RX ADMIN — VARENICLINE TARTRATE 0.5 MG: 0.5 TABLET, FILM COATED ORAL at 08:29

## 2017-12-29 RX ADMIN — AZITHROMYCIN 250 MG: 250 TABLET, FILM COATED ORAL at 14:03

## 2017-12-29 RX ADMIN — MOMETASONE FUROATE AND FORMOTEROL FUMARATE DIHYDRATE 2 PUFF: 200; 5 AEROSOL RESPIRATORY (INHALATION) at 07:25

## 2017-12-29 RX ADMIN — VARENICLINE TARTRATE 0.5 MG: 0.5 TABLET, FILM COATED ORAL at 18:09

## 2017-12-29 RX ADMIN — FLUOXETINE 20 MG: 20 CAPSULE ORAL at 08:29

## 2017-12-29 RX ADMIN — PANTOPRAZOLE SODIUM 40 MG: 40 TABLET, DELAYED RELEASE ORAL at 05:30

## 2017-12-29 RX ADMIN — Medication 10 ML: at 08:30

## 2017-12-29 RX ADMIN — METHYLPREDNISOLONE SODIUM SUCCINATE 40 MG: 40 INJECTION, POWDER, FOR SOLUTION INTRAMUSCULAR; INTRAVENOUS at 02:17

## 2017-12-29 RX ADMIN — ENOXAPARIN SODIUM 40 MG: 40 INJECTION SUBCUTANEOUS at 08:29

## 2017-12-29 RX ADMIN — METHYLPREDNISOLONE SODIUM SUCCINATE 40 MG: 40 INJECTION, POWDER, FOR SOLUTION INTRAMUSCULAR; INTRAVENOUS at 10:50

## 2017-12-29 RX ADMIN — IPRATROPIUM BROMIDE AND ALBUTEROL SULFATE 1 AMPULE: .5; 3 SOLUTION RESPIRATORY (INHALATION) at 11:12

## 2017-12-29 RX ADMIN — IPRATROPIUM BROMIDE AND ALBUTEROL SULFATE 1 AMPULE: .5; 3 SOLUTION RESPIRATORY (INHALATION) at 07:24

## 2017-12-29 NOTE — PROGRESS NOTES
Tolerance   Activity Tolerance Patient Tolerated treatment well;Patient limited by endurance   PT Equipment Recommendations   Equipment Needed No   Plan   Times per week 1-2x/day,6-7 days/week   Current Treatment Recommendations Endurance Training;Gait Training   Safety Devices   Type of devices Gait belt;Left in chair;Nurse notified;Call light within reach   Restraints   Initially in place No   Progress Note   See Progress Note Yes   PT Whiteboard Notes   Therapy Whiteboard 2/6, home independently     12:42-13:37

## 2017-12-29 NOTE — PROGRESS NOTES
Follow-up pneumonia  Less cough and less shortness of breath no PND no orthopnea no tachypnea  Review of system no fever no chills no GI/ complaints no cardiac complaints no TIA no bleeding no polydipsia no hypoglycemia no sinus pain no headaches  Physical exam vitals stable  Eyes no pallor no jaundice  Neck supple no JVD no bruit  Lungs air entry equal decreased at the bases and prolonged expiratory phase with few rhonchi  Heart regular rate and rhythm no gallop  Abdomen soft possible sounds without any tenderness without any megaly  Extremities good pulses without edema negative Homans sign  Neuro alert oriented ×3 with no focal deficits  Assessment and plan  Pneumonia continue with antibiotics  COPD exacerbation better  Possible lung and bone metastasis  Pleural effusion negative for malignancy  Elevated alkaline phosphatase  Pulmonary atelectasis meds labs reviewed await pulmonary input about discharge see orders

## 2017-12-29 NOTE — PROGRESS NOTES
have supplemental oxygen. Condition will improve or be benefited by oxygen use. The patient is  able to perform good mobility in a home setting and therefore does require the use of a portable oxygen system. The need for this equipment was discussed with the patient and he understands and is in agreement. · Incentive spirometry every hour while awake  · Smoking cessation education  · OK for discharge planning from pulmonary stand point with follow up with pulmonary in 2-3 weeks. Plan was discussed with patient and he understands it.   · Discussed with nursing    Electronically signed by     Vaughn Dutta CNP on 12/29/2017 at 5:43 PM  Patient was seen under the supervision of Dr. Allen Fernandez and Sleep Medicine,    [facility] AT [place]: [phone]

## 2017-12-29 NOTE — PROGRESS NOTES
Home Oxygen Evaluation    Home Oxygen Evaluation completed. Patient is on 2 liters per minute via nasal cannula. Resting SpO2 = 95%  Resting SpO2 on room air = 92%    SpO2 on room air with exercise = 88%  SpO2 on oxygen as above with exercise = 93%    Nocturnal Oximetry with patient on room air is recommended is SpO2 is between 89% and 95% (requires additional order).     Vicenta Payne  12:48 PM

## 2017-12-29 NOTE — PROGRESS NOTES
[facility] called and informed of need for portable and home oxygen concentrator for discharge. Script faxed along with notes and facesheet to [phone]. Portable is to be delivered to unit for discharge.

## 2017-12-29 NOTE — PROGRESS NOTES
flex and IR/ER,5/5 otherwise B LE's  Strength LLE  Strength LLE: WFL  Strength RUE  Strength RUE: WFL  Comment: 5/5 B UE's  Strength LUE  Strength LUE: WFL  Tone RLE  RLE Tone: Normotonic  Tone LLE  LLE Tone: Normotonic  Motor Control  Gross Motor?: WNL  Sensation  Overall Sensation Status: WNL  Bed mobility  Comment: Pt. in chair when PT arrived  Transfers  Sit to Stand: Independent  Stand to sit: Independent  Stand Pivot Transfers: Independent  Ambulation  Ambulation?: Yes  Ambulation 1  Surface: level tile  Device: No Device  Assistance: Independent  Quality of Gait: Good pacing and pattern  Distance: 110' x 2  Comments: Pt. required sitting rest after 110' to prevent SOB  Stairs/Curb  Stairs?: No     Balance  Posture: Good  Sitting - Static: Good  Sitting - Dynamic: Good  Standing - Static: Good  Standing - Dynamic: Good  Exercises  Comments: LE HEP hand out     Assessment   Body structures, Functions, Activity limitations: Decreased endurance  Prognosis: Excellent  Decision Making: Low Complexity  REQUIRES PT FOLLOW UP: Yes  Activity Tolerance  Activity Tolerance: Patient Tolerated treatment well;Patient limited by endurance  PT Equipment Recommendations  Equipment Needed: No     Discharge Recommendations:  Home independently      Plan   Plan  Times per week: 1-2x/day,6-7 days/week  Current Treatment Recommendations: Endurance Training, Gait Training  Safety Devices  Type of devices: Gait belt, Left in chair, Nurse notified, Call light within reach  Restraints  Initially in place: No    G-Code  PT G-Codes  Functional Assessment Tool Used: KFO  Score: 26  Functional Limitation: Mobility: Walking and moving around  Mobility: Walking and Moving Around Current Status ():  At least 1 percent but less than 20 percent impaired, limited or restricted  Mobility: Walking and Moving Around Goal Status (): 0 percent impaired, limited or restricted  OutComes Score                                           AM-PAC Score             Goals  Short term goals  Time Frame for Short term goals: 6 treatments  Short term goal 1: Independent ambulation 300' x 1  Short term goal 2: Tolerate 30 min ther act  Patient Goals   Patient goals : GO home and back to work       Therapy Time   Individual Concurrent Group Co-treatment   Time In [address] [place] [address]         Time Out 0926         Minutes [id number] [facility]

## 2017-12-31 LAB
CULTURE: NORMAL
Lab: NORMAL
Lab: NORMAL
SPECIMEN DESCRIPTION: NORMAL
STATUS: NORMAL
STATUS: NORMAL

## 2018-01-02 NOTE — DISCHARGE SUMMARY
Physician Discharge Summary     Patient ID:  Leslie Colbert  [id number]  60 y.o.  1955    Admit date: 12/25/2017    Discharge date and time:  12/29/2017    Admission Diagnoses:   Patient Active Problem List   Diagnosis    Pneumonia    Elevated alkaline phosphatase level       Discharge Diagnoses: Pneumonia  COPD exacerbation  Pleural effusion  Elevated alkaline phosphatase  Pulmonary atelectasis  Possible lung (bone metastasis   smokerobesity BMI 30.34    Consults: pulmonary/intensive care and hematology/oncology    Procedures: Thoracentesis    Hospital Course: Patient admitted with chest pain shortness of breath cough was treated for pneumonia and COPD exacerbation with IV antibiotics IV steroids and bronchodilators and oxygen CT of the chest showed possible bony metastasis and possible lung metastasis.   Effusion was negative for malignancy no major complication during his stay he did fairly well was supposed to be discharged antibiotics and tapering steroids and bronchodilators with PCP in 1 week he'll follow-up with oncology in 1 week he will need to bone marrow biopsy and a PET/CT    Discharge Exam:  See progress note from today    Disposition: home  Stable improved  Patient Instructions:   Discharge Medication List as of 12/29/2017  6:48 PM      START taking these medications    Details   cefUROXime (CEFTIN) 500 MG tablet Take 1 tablet by mouth 2 times daily for 10 doses, Disp-10 tablet, R-0Print      azithromycin (ZITHROMAX) 250 MG tablet One aday x 4 days, Disp-1 packet, R-0Print      !! predniSONE (DELTASONE) 20 MG tablet Take 1.5 tablets by mouth daily for 3 days, Disp-5 tablet, R-0Print      !! predniSONE (DELTASONE) 10 MG tablet Take 2 tablets by mouth daily for 3 days, Disp-6 tablet, R-0Print      !! predniSONE (DELTASONE) 10 MG tablet Take 1 tablet by mouth daily for 3 days, Disp-3 tablet, R-0Print      !! predniSONE (DELTASONE) 5 MG tablet Take 2 tablets by mouth every 48 hours for 3 doses, Disp-3 tablet, R-0Print       !! - Potential duplicate medications found. Please discuss with provider. CONTINUE these medications which have NOT CHANGED    Details   FLUoxetine (PROZAC) 20 MG capsule Take 20 mg by mouth dailyHistorical Med      omeprazole (PRILOSEC) 20 MG delayed release capsule Take 20 mg by mouth dailyHistorical Med      mometasone-formoterol (DULERA) 200-5 MCG/ACT inhaler Inhale 2 puffs into the lungs every 12 hoursHistorical Med      tiotropium (SPIRIVA) 18 MCG inhalation capsule Inhale 18 mcg into the lungs dailyHistorical Med      varenicline (CHANTIX) 0.5 MG tablet Take 0.5 mg by mouth 2 times dailyHistorical Med         STOP taking these medications       ibuprofen (ADVIL;MOTRIN) 600 MG tablet Comments:   Reason for Stopping:             Activity: activity as tolerated  Diet: regular diet    Follow-up with PCP in 1 week. Oncology in 1 week, pulmonary and to 3 weeks  Signed:   Madina Ramon MD  1/1/2018  7:52 PM

## 2018-01-03 LAB
CULTURE: ABNORMAL
CULTURE: ABNORMAL
DIRECT EXAM: ABNORMAL
Lab: ABNORMAL
SPECIMEN DESCRIPTION: ABNORMAL
STATUS: ABNORMAL

## 2018-01-11 ENCOUNTER — HOSPITAL ENCOUNTER (OUTPATIENT)
Age: 63
Setting detail: SPECIMEN
Discharge: HOME | End: 2018-01-11
Payer: COMMERCIAL

## 2018-01-17 ENCOUNTER — APPOINTMENT (OUTPATIENT)
Dept: CT IMAGING | Age: 63
DRG: 193 | End: 2018-01-17
Payer: COMMERCIAL

## 2018-01-17 ENCOUNTER — APPOINTMENT (OUTPATIENT)
Dept: GENERAL RADIOLOGY | Age: 63
DRG: 193 | End: 2018-01-17
Payer: COMMERCIAL

## 2018-01-17 ENCOUNTER — HOSPITAL ENCOUNTER (INPATIENT)
Age: 63
LOS: 11 days | Discharge: HOSPICE | DRG: 193 | End: 2018-01-28
Attending: EMERGENCY MEDICINE | Admitting: INTERNAL MEDICINE
Payer: COMMERCIAL

## 2018-01-17 DIAGNOSIS — J18.9 PNEUMONIA OF RIGHT UPPER LOBE DUE TO INFECTIOUS ORGANISM: Primary | ICD-10-CM

## 2018-01-17 DIAGNOSIS — J90 PLEURAL EFFUSION: ICD-10-CM

## 2018-01-17 PROBLEM — J44.1 COPD EXACERBATION (HCC): Status: ACTIVE | Noted: 2018-01-17

## 2018-01-17 LAB
ABSOLUTE EOS #: 0.1 K/UL (ref 0–0.4)
ABSOLUTE IMMATURE GRANULOCYTE: ABNORMAL K/UL (ref 0–0.3)
ABSOLUTE LYMPH #: 2.14 K/UL (ref 1–4.8)
ABSOLUTE MONO #: 0.82 K/UL (ref 0.2–0.8)
ANION GAP SERPL CALCULATED.3IONS-SCNC: 12 MMOL/L (ref 9–17)
BASOPHILS # BLD: 1 %
BASOPHILS ABSOLUTE: 0.1 K/UL (ref 0–0.2)
BUN BLDV-MCNC: 15 MG/DL (ref 8–23)
BUN/CREAT BLD: 21 (ref 9–20)
CALCIUM SERPL-MCNC: 9.1 MG/DL (ref 8.6–10.4)
CHLORIDE BLD-SCNC: 100 MMOL/L (ref 98–107)
CO2: 30 MMOL/L (ref 20–31)
CREAT SERPL-MCNC: 0.7 MG/DL (ref 0.7–1.2)
D-DIMER QUANTITATIVE: 8.82 MG/L FEU
DIFFERENTIAL TYPE: ABNORMAL
EKG ATRIAL RATE: 112 BPM
EKG P AXIS: 92 DEGREES
EKG P-R INTERVAL: 148 MS
EKG Q-T INTERVAL: 324 MS
EKG QRS DURATION: 94 MS
EKG QTC CALCULATION (BAZETT): 444 MS
EKG R AXIS: -14 DEGREES
EKG T AXIS: 44 DEGREES
EKG VENTRICULAR RATE: 113 BPM
EOSINOPHILS RELATIVE PERCENT: 1 % (ref 1–4)
GFR AFRICAN AMERICAN: >60 ML/MIN
GFR NON-AFRICAN AMERICAN: >60 ML/MIN
GFR SERPL CREATININE-BSD FRML MDRD: ABNORMAL ML/MIN/{1.73_M2}
GFR SERPL CREATININE-BSD FRML MDRD: ABNORMAL ML/MIN/{1.73_M2}
GLUCOSE BLD-MCNC: 163 MG/DL (ref 70–99)
HCT VFR BLD CALC: 34.9 % (ref 41–53)
HEMOGLOBIN: 11.8 G/DL (ref 13.5–17.5)
IMMATURE GRANULOCYTES: ABNORMAL %
LACTIC ACID, WHOLE BLOOD: NORMAL MMOL/L (ref 0.7–2.1)
LACTIC ACID: 1.5 MMOL/L (ref 0.5–2.2)
LACTIC ACID: 2.5 MMOL/L (ref 0.5–2.2)
LYMPHOCYTES # BLD: 21 % (ref 24–44)
MCH RBC QN AUTO: 29 PG (ref 26–34)
MCHC RBC AUTO-ENTMCNC: 33.8 G/DL (ref 31–37)
MCV RBC AUTO: 85.8 FL (ref 80–100)
MONOCYTES # BLD: 8 % (ref 1–7)
MORPHOLOGY: ABNORMAL
NRBC AUTOMATED: ABNORMAL PER 100 WBC
NUCLEATED RED BLOOD CELLS: 3 PER 100 WBC
PDW BLD-RTO: 15.2 % (ref 11.5–14.5)
PLATELET # BLD: 93 K/UL (ref 130–400)
PLATELET ESTIMATE: ABNORMAL
PMV BLD AUTO: 7.5 FL (ref 6–12)
POTASSIUM SERPL-SCNC: 3.9 MMOL/L (ref 3.7–5.3)
RBC # BLD: 4.07 M/UL (ref 4.5–5.9)
RBC # BLD: ABNORMAL 10*6/UL
SEG NEUTROPHILS: 69 % (ref 36–66)
SEGMENTED NEUTROPHILS ABSOLUTE COUNT: 7.04 K/UL (ref 1.8–7.7)
SODIUM BLD-SCNC: 142 MMOL/L (ref 135–144)
TROPONIN INTERP: NORMAL
TROPONIN T: <0.03 NG/ML
WBC # BLD: 10.2 K/UL (ref 3.5–11)
WBC # BLD: ABNORMAL 10*3/UL

## 2018-01-17 PROCEDURE — 2580000003 HC RX 258: Performed by: EMERGENCY MEDICINE

## 2018-01-17 PROCEDURE — 2580000003 HC RX 258: Performed by: NURSE PRACTITIONER

## 2018-01-17 PROCEDURE — 84484 ASSAY OF TROPONIN QUANT: CPT

## 2018-01-17 PROCEDURE — 96365 THER/PROPH/DIAG IV INF INIT: CPT

## 2018-01-17 PROCEDURE — 2580000003 HC RX 258: Performed by: INTERNAL MEDICINE

## 2018-01-17 PROCEDURE — 2500000003 HC RX 250 WO HCPCS: Performed by: EMERGENCY MEDICINE

## 2018-01-17 PROCEDURE — 99285 EMERGENCY DEPT VISIT HI MDM: CPT

## 2018-01-17 PROCEDURE — 6360000002 HC RX W HCPCS: Performed by: INTERNAL MEDICINE

## 2018-01-17 PROCEDURE — 93005 ELECTROCARDIOGRAM TRACING: CPT

## 2018-01-17 PROCEDURE — 96375 TX/PRO/DX INJ NEW DRUG ADDON: CPT

## 2018-01-17 PROCEDURE — 6360000002 HC RX W HCPCS: Performed by: NURSE PRACTITIONER

## 2018-01-17 PROCEDURE — 94640 AIRWAY INHALATION TREATMENT: CPT

## 2018-01-17 PROCEDURE — 2060000000 HC ICU INTERMEDIATE R&B

## 2018-01-17 PROCEDURE — 6360000004 HC RX CONTRAST MEDICATION: Performed by: NURSE PRACTITIONER

## 2018-01-17 PROCEDURE — 85379 FIBRIN DEGRADATION QUANT: CPT

## 2018-01-17 PROCEDURE — 71045 X-RAY EXAM CHEST 1 VIEW: CPT

## 2018-01-17 PROCEDURE — 83605 ASSAY OF LACTIC ACID: CPT

## 2018-01-17 PROCEDURE — 80048 BASIC METABOLIC PNL TOTAL CA: CPT

## 2018-01-17 PROCEDURE — 71260 CT THORAX DX C+: CPT

## 2018-01-17 PROCEDURE — 85025 COMPLETE CBC W/AUTO DIFF WBC: CPT

## 2018-01-17 PROCEDURE — 87040 BLOOD CULTURE FOR BACTERIA: CPT

## 2018-01-17 RX ORDER — ALBUTEROL SULFATE 0.63 MG/3ML
1 SOLUTION RESPIRATORY (INHALATION) EVERY 6 HOURS PRN
COMMUNITY

## 2018-01-17 RX ORDER — METHYLPREDNISOLONE SODIUM SUCCINATE 125 MG/2ML
125 INJECTION, POWDER, LYOPHILIZED, FOR SOLUTION INTRAMUSCULAR; INTRAVENOUS ONCE
Status: COMPLETED | OUTPATIENT
Start: 2018-01-17 | End: 2018-01-17

## 2018-01-17 RX ORDER — FLUOXETINE HYDROCHLORIDE 20 MG/1
20 CAPSULE ORAL DAILY
Status: DISCONTINUED | OUTPATIENT
Start: 2018-01-18 | End: 2018-01-28

## 2018-01-17 RX ORDER — ALBUTEROL SULFATE 90 UG/1
2 AEROSOL, METERED RESPIRATORY (INHALATION)
Status: DISCONTINUED | OUTPATIENT
Start: 2018-01-17 | End: 2018-01-17

## 2018-01-17 RX ORDER — ALBUTEROL SULFATE 2.5 MG/3ML
5 SOLUTION RESPIRATORY (INHALATION)
Status: DISCONTINUED | OUTPATIENT
Start: 2018-01-17 | End: 2018-01-17

## 2018-01-17 RX ORDER — IPRATROPIUM BROMIDE AND ALBUTEROL SULFATE 2.5; .5 MG/3ML; MG/3ML
1 SOLUTION RESPIRATORY (INHALATION)
Status: DISCONTINUED | OUTPATIENT
Start: 2018-01-17 | End: 2018-01-17

## 2018-01-17 RX ORDER — 0.9 % SODIUM CHLORIDE 0.9 %
500 INTRAVENOUS SOLUTION INTRAVENOUS ONCE
Status: COMPLETED | OUTPATIENT
Start: 2018-01-17 | End: 2018-01-17

## 2018-01-17 RX ORDER — SODIUM CHLORIDE 0.9 % (FLUSH) 0.9 %
10 SYRINGE (ML) INJECTION PRN
Status: DISCONTINUED | OUTPATIENT
Start: 2018-01-17 | End: 2018-01-18 | Stop reason: SDUPTHER

## 2018-01-17 RX ORDER — METHYLPREDNISOLONE SODIUM SUCCINATE 125 MG/2ML
60 INJECTION, POWDER, LYOPHILIZED, FOR SOLUTION INTRAMUSCULAR; INTRAVENOUS EVERY 6 HOURS
Status: DISCONTINUED | OUTPATIENT
Start: 2018-01-17 | End: 2018-01-19

## 2018-01-17 RX ORDER — ACETAMINOPHEN 325 MG/1
650 TABLET ORAL EVERY 4 HOURS PRN
Status: DISCONTINUED | OUTPATIENT
Start: 2018-01-17 | End: 2018-01-28 | Stop reason: HOSPADM

## 2018-01-17 RX ORDER — ALBUTEROL SULFATE 2.5 MG/3ML
2.5 SOLUTION RESPIRATORY (INHALATION)
Status: DISCONTINUED | OUTPATIENT
Start: 2018-01-18 | End: 2018-01-18

## 2018-01-17 RX ORDER — 0.9 % SODIUM CHLORIDE 0.9 %
50 INTRAVENOUS SOLUTION INTRAVENOUS ONCE
Status: COMPLETED | OUTPATIENT
Start: 2018-01-17 | End: 2018-01-17

## 2018-01-17 RX ORDER — MORPHINE SULFATE 2 MG/ML
2 INJECTION, SOLUTION INTRAMUSCULAR; INTRAVENOUS ONCE
Status: COMPLETED | OUTPATIENT
Start: 2018-01-17 | End: 2018-01-17

## 2018-01-17 RX ADMIN — Medication 10 ML: at 16:32

## 2018-01-17 RX ADMIN — MORPHINE SULFATE 2 MG: 2 INJECTION, SOLUTION INTRAMUSCULAR; INTRAVENOUS at 16:17

## 2018-01-17 RX ADMIN — SODIUM CHLORIDE 500 ML: 0.9 INJECTION, SOLUTION INTRAVENOUS at 17:27

## 2018-01-17 RX ADMIN — CEFTRIAXONE SODIUM 1 G: 10 INJECTION, POWDER, FOR SOLUTION INTRAVENOUS at 23:40

## 2018-01-17 RX ADMIN — TAZOBACTAM SODIUM AND PIPERACILLIN SODIUM 3.38 G: 375; 3 INJECTION, SOLUTION INTRAVENOUS at 17:27

## 2018-01-17 RX ADMIN — ALBUTEROL SULFATE 5 MG: 5 SOLUTION RESPIRATORY (INHALATION) at 15:18

## 2018-01-17 RX ADMIN — METHYLPREDNISOLONE SODIUM SUCCINATE 125 MG: 125 INJECTION, POWDER, FOR SOLUTION INTRAMUSCULAR; INTRAVENOUS at 15:35

## 2018-01-17 RX ADMIN — IOPAMIDOL 80 ML: 755 INJECTION, SOLUTION INTRAVENOUS at 16:32

## 2018-01-17 RX ADMIN — METHYLPREDNISOLONE SODIUM SUCCINATE 60 MG: 125 INJECTION, POWDER, FOR SOLUTION INTRAMUSCULAR; INTRAVENOUS at 22:25

## 2018-01-17 RX ADMIN — SODIUM CHLORIDE 50 ML: 9 INJECTION, SOLUTION INTRAVENOUS at 16:32

## 2018-01-17 ASSESSMENT — ENCOUNTER SYMPTOMS
WHEEZING: 1
SHORTNESS OF BREATH: 1
SORE THROAT: 0
VOICE CHANGE: 0
TROUBLE SWALLOWING: 0
COUGH: 1
GASTROINTESTINAL NEGATIVE: 1
CHEST TIGHTNESS: 1

## 2018-01-17 ASSESSMENT — PAIN DESCRIPTION - LOCATION: LOCATION: RIB CAGE

## 2018-01-17 ASSESSMENT — PAIN DESCRIPTION - DESCRIPTORS: DESCRIPTORS: SHARP;STABBING

## 2018-01-17 ASSESSMENT — PAIN SCALES - GENERAL
PAINLEVEL_OUTOF10: 10
PAINLEVEL_OUTOF10: 8
PAINLEVEL_OUTOF10: 3

## 2018-01-17 ASSESSMENT — PAIN DESCRIPTION - ORIENTATION: ORIENTATION: RIGHT;POSTERIOR

## 2018-01-17 NOTE — ED PROVIDER NOTES
Attending Supervisory Note/Shared Visit   I have personally performed a face to face diagnostic evaluation on this patient. I have reviewed the mid-levels findings and agree. Patient came from, from the office of the pulmonologist and shortness of breath  A d-dimer was found to be related    Patient does produce scattered rhonchi wheezing    Biopsy report shows from Dr. Delia Hsieh  office    Biopsy positive for cancer primary source unknown    Ct  Scan  No emboli       Pneumonia    Consult Dr. Patria Willis, Dr Priyank Hsieh.  [place]          (Please note that portions of this note were completed with a voice recognition program.  Efforts were made to edit the dictations but occasionally words are mis-transcribed.)    Mariano Uriostegui MD  Attending Emergency Physician       Mariano Uriostegui MD  01/17/18 9484
confluent geographic appearing irregular   ground-glass attenuation particularly in the upper lobes right greater than   left along with a new band in the right lower lobe which may represent acute   pneumonitis although remains nonspecific. 4. Stable diffuse interlobular septal thickening. Possibilities include   pulmonary edema versus lymphangitic carcinomatosis. 5. Diffuse osseous metastasis again demonstrated. XR CHEST PORTABLE   Final Result   Bilateral airspace disease is again demonstrated. The pleural effusions   appear improved in the interval.                 LABS:  Labs Reviewed   CBC WITH AUTO DIFFERENTIAL - Abnormal; Notable for the following:        Result Value    RBC 4.07 (*)     Hemoglobin 11.8 (*)     Hematocrit 34.9 (*)     RDW 15.2 (*)     Platelets 93 (*)     Seg Neutrophils 69 (*)     Lymphocytes 21 (*)     Monocytes 8 (*)     nRBC 3 (*)     Absolute Mono # 0.82 (*)     All other components within normal limits   BASIC METABOLIC PANEL - Abnormal; Notable for the following:     Glucose 163 (*)     Bun/Cre Ratio 21 (*)     All other components within normal limits   LACTIC ACID - Abnormal; Notable for the following:     Lactic Acid 2.5 (*)     All other components within normal limits   CULTURE BLOOD #1   CULTURE BLOOD #1   TROPONIN   TROPONIN   TROPONIN   D-DIMER, QUANTITATIVE   LACTIC ACID, PLASMA   TROPONIN   LACTIC ACID, PLASMA   LACTIC ACID, PLASMA   LACTIC ACID, PLASMA       All other labs were within normal range or not returned as of this dictation. EMERGENCY DEPARTMENT COURSE and DIFFERENTIAL DIAGNOSIS/MDM:   Vitals:    Vitals:    18 1728 18 1730 18 1846 18 1903   BP:   (!) 149/72    Pulse: 100 99 99 105   Resp:  19   Temp:       TempSrc:       SpO2: 97% 97% 96% 95%   Weight:       Height:           Medical Decision Makin-year-old male with exacerbation of COPD as well as a new diagnosis of adenocarcinoma.   Respiratory distress was

## 2018-01-18 ENCOUNTER — APPOINTMENT (OUTPATIENT)
Dept: INTERVENTIONAL RADIOLOGY/VASCULAR | Age: 63
DRG: 193 | End: 2018-01-18
Payer: COMMERCIAL

## 2018-01-18 LAB
ALBUMIN SERPL-MCNC: 3.2 G/DL (ref 3.5–5.2)
ALBUMIN SERPL-MCNC: 3.3 G/DL (ref 3.5–5.2)
ALBUMIN/GLOBULIN RATIO: ABNORMAL (ref 1–2.5)
ALBUMIN/GLOBULIN RATIO: ABNORMAL (ref 1–2.5)
ALP BLD-CCNC: 1064 U/L (ref 40–129)
ALP BLD-CCNC: 971 U/L (ref 40–129)
ALT SERPL-CCNC: 30 U/L (ref 5–41)
ALT SERPL-CCNC: 32 U/L (ref 5–41)
ANION GAP SERPL CALCULATED.3IONS-SCNC: 11 MMOL/L (ref 9–17)
ANION GAP SERPL CALCULATED.3IONS-SCNC: 9 MMOL/L (ref 9–17)
AST SERPL-CCNC: 29 U/L
AST SERPL-CCNC: 30 U/L
BILIRUB SERPL-MCNC: 0.37 MG/DL (ref 0.3–1.2)
BILIRUB SERPL-MCNC: 0.6 MG/DL (ref 0.3–1.2)
BUN BLDV-MCNC: 14 MG/DL (ref 8–23)
BUN BLDV-MCNC: 17 MG/DL (ref 8–23)
BUN/CREAT BLD: 23 (ref 9–20)
BUN/CREAT BLD: 25 (ref 9–20)
CALCIUM SERPL-MCNC: 8.6 MG/DL (ref 8.6–10.4)
CALCIUM SERPL-MCNC: 8.8 MG/DL (ref 8.6–10.4)
CHLORIDE BLD-SCNC: 101 MMOL/L (ref 98–107)
CHLORIDE BLD-SCNC: 94 MMOL/L (ref 98–107)
CO2: 31 MMOL/L (ref 20–31)
CO2: 33 MMOL/L (ref 20–31)
CREAT SERPL-MCNC: 0.61 MG/DL (ref 0.7–1.2)
CREAT SERPL-MCNC: 0.69 MG/DL (ref 0.7–1.2)
GFR AFRICAN AMERICAN: >60 ML/MIN
GFR AFRICAN AMERICAN: >60 ML/MIN
GFR NON-AFRICAN AMERICAN: >60 ML/MIN
GFR NON-AFRICAN AMERICAN: >60 ML/MIN
GFR SERPL CREATININE-BSD FRML MDRD: ABNORMAL ML/MIN/{1.73_M2}
GLUCOSE BLD-MCNC: 211 MG/DL (ref 70–99)
GLUCOSE BLD-MCNC: 225 MG/DL (ref 70–99)
GLUCOSE, FLUID: 170 MG/DL
HCT VFR BLD CALC: 30.2 % (ref 41–53)
HEMOGLOBIN: 10.2 G/DL (ref 13.5–17.5)
INR BLD: 1.1
LACTATE DEHYDROGENASE, FLUID: 163 U/L
LACTIC ACID, WHOLE BLOOD: NORMAL MMOL/L (ref 0.7–2.1)
LACTIC ACID: 1.2 MMOL/L (ref 0.5–2.2)
LACTIC ACID: 1.3 MMOL/L (ref 0.5–2.2)
LACTIC ACID: 1.5 MMOL/L (ref 0.5–2.2)
MCH RBC QN AUTO: 29.2 PG (ref 26–34)
MCHC RBC AUTO-ENTMCNC: 33.9 G/DL (ref 31–37)
MCV RBC AUTO: 86 FL (ref 80–100)
NRBC AUTOMATED: ABNORMAL PER 100 WBC
PARTIAL THROMBOPLASTIN TIME: 30.3 SEC (ref 23–31)
PDW BLD-RTO: 15.1 % (ref 11.5–14.5)
PLATELET # BLD: 79 K/UL (ref 130–400)
PMV BLD AUTO: 7.5 FL (ref 6–12)
POTASSIUM SERPL-SCNC: 3.8 MMOL/L (ref 3.7–5.3)
POTASSIUM SERPL-SCNC: 4.2 MMOL/L (ref 3.7–5.3)
PROTHROMBIN TIME: 10.9 SEC (ref 9.7–11.6)
RBC # BLD: 3.51 M/UL (ref 4.5–5.9)
SODIUM BLD-SCNC: 136 MMOL/L (ref 135–144)
SODIUM BLD-SCNC: 143 MMOL/L (ref 135–144)
SPECIMEN TYPE: NORMAL
TOTAL PROTEIN, BODY FLUID: 4.1 G/DL
TOTAL PROTEIN: 6.4 G/DL (ref 6.4–8.3)
TOTAL PROTEIN: 6.8 G/DL (ref 6.4–8.3)
TROPONIN INTERP: NORMAL
TROPONIN T: <0.03 NG/ML
WBC # BLD: 10.6 K/UL (ref 3.5–11)

## 2018-01-18 PROCEDURE — 6360000002 HC RX W HCPCS: Performed by: NURSE PRACTITIONER

## 2018-01-18 PROCEDURE — 83036 HEMOGLOBIN GLYCOSYLATED A1C: CPT

## 2018-01-18 PROCEDURE — 36415 COLL VENOUS BLD VENIPUNCTURE: CPT

## 2018-01-18 PROCEDURE — 49083 ABD PARACENTESIS W/IMAGING: CPT

## 2018-01-18 PROCEDURE — 86403 PARTICLE AGGLUT ANTBDY SCRN: CPT

## 2018-01-18 PROCEDURE — 87186 SC STD MICRODIL/AGAR DIL: CPT

## 2018-01-18 PROCEDURE — 94760 N-INVAS EAR/PLS OXIMETRY 1: CPT

## 2018-01-18 PROCEDURE — 2580000003 HC RX 258: Performed by: INTERNAL MEDICINE

## 2018-01-18 PROCEDURE — 87015 SPECIMEN INFECT AGNT CONCNTJ: CPT

## 2018-01-18 PROCEDURE — 85027 COMPLETE CBC AUTOMATED: CPT

## 2018-01-18 PROCEDURE — 87070 CULTURE OTHR SPECIMN AEROBIC: CPT

## 2018-01-18 PROCEDURE — 85730 THROMBOPLASTIN TIME PARTIAL: CPT

## 2018-01-18 PROCEDURE — 89051 BODY FLUID CELL COUNT: CPT

## 2018-01-18 PROCEDURE — 2500000003 HC RX 250 WO HCPCS: Performed by: RADIOLOGY

## 2018-01-18 PROCEDURE — 0W993ZX DRAINAGE OF RIGHT PLEURAL CAVITY, PERCUTANEOUS APPROACH, DIAGNOSTIC: ICD-10-PCS | Performed by: RADIOLOGY

## 2018-01-18 PROCEDURE — 6360000002 HC RX W HCPCS: Performed by: INTERNAL MEDICINE

## 2018-01-18 PROCEDURE — 85610 PROTHROMBIN TIME: CPT

## 2018-01-18 PROCEDURE — 87075 CULTR BACTERIA EXCEPT BLOOD: CPT

## 2018-01-18 PROCEDURE — 88305 TISSUE EXAM BY PATHOLOGIST: CPT

## 2018-01-18 PROCEDURE — 87116 MYCOBACTERIA CULTURE: CPT

## 2018-01-18 PROCEDURE — 87205 SMEAR GRAM STAIN: CPT

## 2018-01-18 PROCEDURE — 87206 SMEAR FLUORESCENT/ACID STAI: CPT

## 2018-01-18 PROCEDURE — 88275 CYTOGENETICS 100-300: CPT

## 2018-01-18 PROCEDURE — C1729 CATH, DRAINAGE: HCPCS

## 2018-01-18 PROCEDURE — 88381 MICRODISSECTION MANUAL: CPT

## 2018-01-18 PROCEDURE — 88271 CYTOGENETICS DNA PROBE: CPT

## 2018-01-18 PROCEDURE — 88360 TUMOR IMMUNOHISTOCHEM/MANUAL: CPT

## 2018-01-18 PROCEDURE — 88112 CYTOPATH CELL ENHANCE TECH: CPT

## 2018-01-18 PROCEDURE — 82945 GLUCOSE OTHER FLUID: CPT

## 2018-01-18 PROCEDURE — 2700000000 HC OXYGEN THERAPY PER DAY

## 2018-01-18 PROCEDURE — 83615 LACTATE (LD) (LDH) ENZYME: CPT

## 2018-01-18 PROCEDURE — 84484 ASSAY OF TROPONIN QUANT: CPT

## 2018-01-18 PROCEDURE — 88368 INSITU HYBRIDIZATION MANUAL: CPT

## 2018-01-18 PROCEDURE — 6370000000 HC RX 637 (ALT 250 FOR IP): Performed by: INTERNAL MEDICINE

## 2018-01-18 PROCEDURE — 2060000000 HC ICU INTERMEDIATE R&B

## 2018-01-18 PROCEDURE — 81235 EGFR GENE COM VARIANTS: CPT

## 2018-01-18 PROCEDURE — 87102 FUNGUS ISOLATION CULTURE: CPT

## 2018-01-18 PROCEDURE — 94640 AIRWAY INHALATION TREATMENT: CPT

## 2018-01-18 PROCEDURE — 80053 COMPREHEN METABOLIC PANEL: CPT

## 2018-01-18 PROCEDURE — 83605 ASSAY OF LACTIC ACID: CPT

## 2018-01-18 PROCEDURE — 84157 ASSAY OF PROTEIN OTHER: CPT

## 2018-01-18 PROCEDURE — 87077 CULTURE AEROBIC IDENTIFY: CPT

## 2018-01-18 RX ORDER — ALBUTEROL SULFATE 2.5 MG/3ML
2.5 SOLUTION RESPIRATORY (INHALATION) EVERY 4 HOURS PRN
Status: DISCONTINUED | OUTPATIENT
Start: 2018-01-18 | End: 2018-01-28 | Stop reason: HOSPADM

## 2018-01-18 RX ORDER — SODIUM CHLORIDE 0.9 % (FLUSH) 0.9 %
10 SYRINGE (ML) INJECTION PRN
Status: DISCONTINUED | OUTPATIENT
Start: 2018-01-18 | End: 2018-01-28 | Stop reason: HOSPADM

## 2018-01-18 RX ORDER — NICOTINE POLACRILEX 4 MG
15 LOZENGE BUCCAL PRN
Status: DISCONTINUED | OUTPATIENT
Start: 2018-01-18 | End: 2018-01-28

## 2018-01-18 RX ORDER — ALBUTEROL SULFATE 2.5 MG/3ML
2.5 SOLUTION RESPIRATORY (INHALATION)
Status: DISCONTINUED | OUTPATIENT
Start: 2018-01-18 | End: 2018-01-20

## 2018-01-18 RX ORDER — ELECTROLYTES/DEXTROSE
1 SOLUTION, ORAL ORAL DAILY
Status: ON HOLD | COMMUNITY
End: 2018-01-28 | Stop reason: HOSPADM

## 2018-01-18 RX ORDER — HYDROCODONE BITARTRATE AND ACETAMINOPHEN 5; 300 MG/1; MG/1
1-2 TABLET ORAL EVERY 6 HOURS PRN
COMMUNITY

## 2018-01-18 RX ORDER — DEXTROSE MONOHYDRATE 50 MG/ML
100 INJECTION, SOLUTION INTRAVENOUS PRN
Status: DISCONTINUED | OUTPATIENT
Start: 2018-01-18 | End: 2018-01-28

## 2018-01-18 RX ORDER — DEXTROSE MONOHYDRATE 25 G/50ML
12.5 INJECTION, SOLUTION INTRAVENOUS PRN
Status: DISCONTINUED | OUTPATIENT
Start: 2018-01-18 | End: 2018-01-28

## 2018-01-18 RX ORDER — ASCORBIC ACID 500 MG
500 TABLET ORAL 2 TIMES DAILY
COMMUNITY

## 2018-01-18 RX ORDER — SODIUM CHLORIDE 0.9 % (FLUSH) 0.9 %
10 SYRINGE (ML) INJECTION EVERY 12 HOURS SCHEDULED
Status: DISCONTINUED | OUTPATIENT
Start: 2018-01-18 | End: 2018-01-28 | Stop reason: HOSPADM

## 2018-01-18 RX ORDER — MORPHINE SULFATE 2 MG/ML
2 INJECTION, SOLUTION INTRAMUSCULAR; INTRAVENOUS
Status: DISCONTINUED | OUTPATIENT
Start: 2018-01-18 | End: 2018-01-23

## 2018-01-18 RX ORDER — ONDANSETRON 2 MG/ML
4 INJECTION INTRAMUSCULAR; INTRAVENOUS EVERY 6 HOURS PRN
Status: DISCONTINUED | OUTPATIENT
Start: 2018-01-18 | End: 2018-01-28 | Stop reason: HOSPADM

## 2018-01-18 RX ORDER — LIDOCAINE HYDROCHLORIDE 10 MG/ML
INJECTION, SOLUTION INFILTRATION; PERINEURAL
Status: COMPLETED | OUTPATIENT
Start: 2018-01-18 | End: 2018-01-18

## 2018-01-18 RX ORDER — ALBUTEROL SULFATE 2.5 MG/3ML
2.5 SOLUTION RESPIRATORY (INHALATION) EVERY 4 HOURS
Status: DISCONTINUED | OUTPATIENT
Start: 2018-01-18 | End: 2018-01-21

## 2018-01-18 RX ADMIN — ALBUTEROL SULFATE 2.5 MG: 2.5 SOLUTION RESPIRATORY (INHALATION) at 11:22

## 2018-01-18 RX ADMIN — Medication 2 MG: at 19:57

## 2018-01-18 RX ADMIN — ALBUTEROL SULFATE 2.5 MG: 2.5 SOLUTION RESPIRATORY (INHALATION) at 07:50

## 2018-01-18 RX ADMIN — ACETAMINOPHEN 650 MG: 325 TABLET ORAL at 01:58

## 2018-01-18 RX ADMIN — Medication 2 MG: at 23:54

## 2018-01-18 RX ADMIN — METHYLPREDNISOLONE SODIUM SUCCINATE 60 MG: 125 INJECTION, POWDER, FOR SOLUTION INTRAMUSCULAR; INTRAVENOUS at 09:14

## 2018-01-18 RX ADMIN — FLUOXETINE 20 MG: 20 CAPSULE ORAL at 09:14

## 2018-01-18 RX ADMIN — Medication 2 MG: at 12:03

## 2018-01-18 RX ADMIN — Medication 2 PUFF: at 07:51

## 2018-01-18 RX ADMIN — ALBUTEROL SULFATE 2.5 MG: 2.5 SOLUTION RESPIRATORY (INHALATION) at 15:15

## 2018-01-18 RX ADMIN — ALBUTEROL SULFATE 2.5 MG: 2.5 SOLUTION RESPIRATORY (INHALATION) at 22:52

## 2018-01-18 RX ADMIN — METHYLPREDNISOLONE SODIUM SUCCINATE 60 MG: 125 INJECTION, POWDER, FOR SOLUTION INTRAMUSCULAR; INTRAVENOUS at 02:02

## 2018-01-18 RX ADMIN — LIDOCAINE HYDROCHLORIDE 5 ML: 10 INJECTION, SOLUTION INFILTRATION; PERINEURAL at 15:01

## 2018-01-18 RX ADMIN — TIOTROPIUM BROMIDE 18 MCG: 18 CAPSULE ORAL; RESPIRATORY (INHALATION) at 07:51

## 2018-01-18 RX ADMIN — AZITHROMYCIN MONOHYDRATE 500 MG: 500 INJECTION, POWDER, LYOPHILIZED, FOR SOLUTION INTRAVENOUS at 02:58

## 2018-01-18 RX ADMIN — Medication 10 ML: at 09:14

## 2018-01-18 RX ADMIN — ALBUTEROL SULFATE 2.5 MG: 2.5 SOLUTION RESPIRATORY (INHALATION) at 18:50

## 2018-01-18 RX ADMIN — METHYLPREDNISOLONE SODIUM SUCCINATE 60 MG: 125 INJECTION, POWDER, FOR SOLUTION INTRAMUSCULAR; INTRAVENOUS at 19:57

## 2018-01-18 RX ADMIN — Medication 2 PUFF: at 18:58

## 2018-01-18 RX ADMIN — Medication 2 MG: at 13:26

## 2018-01-18 RX ADMIN — METHYLPREDNISOLONE SODIUM SUCCINATE 60 MG: 125 INJECTION, POWDER, FOR SOLUTION INTRAMUSCULAR; INTRAVENOUS at 15:31

## 2018-01-18 ASSESSMENT — PAIN SCALES - GENERAL
PAINLEVEL_OUTOF10: 7
PAINLEVEL_OUTOF10: 3
PAINLEVEL_OUTOF10: 8
PAINLEVEL_OUTOF10: 8
PAINLEVEL_OUTOF10: 7

## 2018-01-18 ASSESSMENT — PAIN DESCRIPTION - LOCATION: LOCATION: RIB CAGE

## 2018-01-18 ASSESSMENT — PAIN DESCRIPTION - PAIN TYPE: TYPE: ACUTE PAIN

## 2018-01-18 ASSESSMENT — PAIN DESCRIPTION - ORIENTATION: ORIENTATION: RIGHT

## 2018-01-18 NOTE — CONSULTS
for pneumonitis. He seems slightly confused at this time. He has complaints of shortness of breath, cough is nonproductive. He is unsure if he is running a fever though he does complain of periodic chills. He has refrained from smoking for the last 1 month. PMHx   Past Medical History      Diagnosis Date    Chronic fatigue     COPD (chronic obstructive pulmonary disease) ([facility]     Depression       Past Surgical History        Procedure Laterality Date    APPENDECTOMY         Meds    Current Medications    mometasone-formoterol  2 puff Inhalation Q12H    sodium chloride flush  10 mL Intravenous 2 times per day    azithromycin  500 mg Intravenous Q24H    FLUoxetine  20 mg Oral Daily    tiotropium  18 mcg Inhalation Daily    enoxaparin  40 mg Subcutaneous Daily    albuterol  2.5 mg Nebulization Q4H WA    methylPREDNISolone  60 mg Intravenous Q6H    cefTRIAXone (ROCEPHIN) IV  1 g Intravenous Q24H     sodium chloride flush, magnesium hydroxide, ondansetron, acetaminophen  IV Drips/Infusions     Home Medications  Prescriptions Prior to Admission: albuterol (ACCUNEB) 0.63 MG/3ML nebulizer solution, Take 1 ampule by nebulization every 6 hours as needed for Wheezing  FLUoxetine (PROZAC) 20 MG capsule, Take 20 mg by mouth daily  omeprazole (PRILOSEC) 20 MG delayed release capsule, Take 20 mg by mouth daily  mometasone-formoterol (DULERA) 200-5 MCG/ACT inhaler, Inhale 2 puffs into the lungs every 12 hours  tiotropium (SPIRIVA) 18 MCG inhalation capsule, Inhale 18 mcg into the lungs daily  varenicline (CHANTIX) 0.5 MG tablet, Take 0.5 mg by mouth 2 times daily    Allergies    Review of patient's allergies indicates no known allergies. Social History     Social History     Social History    Marital status:      Spouse name: N/A    Number of children: N/A    Years of education: N/A     Occupational History    Not on file.      Social History Main Topics    Smoking status: Heavy Tobacco Smoker Packs/day: 1.00     Years: 25.00     Types: Cigarettes    Smokeless tobacco: Never Used    Alcohol use No    Drug use: Unknown    Sexual activity: Not on file     Other Topics Concern    Not on file     Social History Narrative    No narrative on file     Family History    History reviewed. No pertinent family history. ROS - 11 systems   General + chills  HEENT Denies any diplopia, tinnitus or vertigo  Resp +Shortness of breath + cough  Cardiac Denies any chest pain, palpitations, claudication or edema  GI Denies any melena, hematochezia, hematemesis or pyrosis   Denies any frequency, urgency, hesitancy or incontinence  Heme Denies bruising or bleeding easily  Endocrine Denies any history of diabetes or thyroid disease  Neuro Denies any focal motor or sensory deficits  Psychiatric Denies anxiety, depression, suicidal ideation  Skin Denies rashes, itching, open sores    Vitals    BP (!) 144/56   Pulse 91   Temp 97.9 °F (36.6 °C) (Oral)   Resp 20   Ht 6' 1\" (1.854 m)   Wt 228 lb (103.4 kg)   SpO2 96%   BMI 30.08 kg/m²   Body mass index is 30.08 kg/m². I/O    No intake or output data in the 24 hours ending 01/18/18 1106  No intake/output data recorded. Patient Vitals for the past 96 hrs (Last 3 readings):   Weight   01/18/18 0154 228 lb (103.4 kg)   01/17/18 1445 227 lb (103 kg)     Exam   General Appearance  Possibly slightly confused  HEENT - Head is normocephalic, atraumatic. Pupil reactive to light  Neck - Supple, symmetrical, trachea midline and Soft, trachea midline and straight  Lungs - poor air exchange, no significant wheeze, slight rhonchi  Cardiovascular - Heart sounds are normal.  Regular rhythm normal rate without murmur, gallop or rub. Abdomen - Soft, nontender, nondistended, no masses or organomegaly  Neurologic - CN II-XII are grossly intact.  There are no focal motor or sensory deficits  Skin - No bruising or bleeding  Extremities - No cyanosis, clubbing or edema    Labs  - Old

## 2018-01-18 NOTE — CARE COORDINATION
Case Management Initial Discharge Plan  Pete Oscar,         Readmission Risk              Readmission Risk:        2.5       Age 71 or Greater:  0    Admitted from SNF or Requires Paid or Family Care:  0    Currently has CHF,COPD,ARF,CRI,or is on dialysis:  0    Takes more than 5 Prescription Medications:  0    Takes Digoxin,Insulin,Anticoagulants,Narcotics or ASA/Plavix:  [address] in Past 12 Months:  0    On Disability:  0    Patient Considers own Health:  2.5            Met with:patient to discuss discharge plans. Information verified: address, contacts, phone number, , insurance Yes  PCP: Jaydon Trejo MD  Date of last visit:  Last week    Insurance Provider: Spencer Hair    Discharge Planning  Current Residence:  house  Living Arrangements:  Alone   Home has 1 stories/1 stairs to climb  Support Systems:  Family Members  Current Services PTA:   none Agency:  none  Patient able to perform ADL's:Independent  DME in home:  Kierra Puffer, cane, shower chair, home  ([facility])  DME used to aid ambulation prior to admission:    Kierra Puffer, cane, shower chair  DME used during admission:  unknown    Potential Assistance Needed:  N/A    Pharmacy: [facility] [address] Medications:  No  Does patient want to participate in local refill/ meds to beds program?  No    Patient agreeable to home care: No  [place] of choice provided:  n/a      Type of Home Care Services:  None  Patient expects to be discharged to:  Home. Prior SNF/Rehab Placement and Facility:  none  Agreeable to SNF/Rehab: No  [place] of choice provided: n/a   Evaluation: no    Expected Discharge date:  18  Follow Up Appointment: Best Day/ Time: Tuesday AM    Transportation provider: Marilou Silva  Transportation arrangements needed for discharge: No    Discharge Plan: Met with pt at bedside. Pt was patient [facility] at [facility] for exac of COPD and new diagnosis of cancer.     Pt lives alone and currently has his daughter Talisha Granger staying with him. His sister is retired RN. Pt was set up with home 02 thru [facility] 24/7. Discussed dc needs. Pt declines any need.        Electronically signed by Daniel Jacobo RN on 1/18/18 at 12:20 PM

## 2018-01-19 ENCOUNTER — APPOINTMENT (OUTPATIENT)
Dept: GENERAL RADIOLOGY | Age: 63
DRG: 193 | End: 2018-01-19
Payer: COMMERCIAL

## 2018-01-19 LAB
ABSOLUTE EOS #: 0.12 K/UL (ref 0–0.4)
ABSOLUTE IMMATURE GRANULOCYTE: ABNORMAL K/UL (ref 0–0.3)
ABSOLUTE LYMPH #: 2.34 K/UL (ref 1–4.8)
ABSOLUTE MONO #: 0.82 K/UL (ref 0.2–0.8)
APPEARANCE FLUID: NORMAL
BASO FLUID: NORMAL %
BASOPHILS # BLD: 0 %
BASOPHILS ABSOLUTE: 0 K/UL (ref 0–0.2)
CASE NUMBER:: NORMAL
COLOR FLUID: NORMAL
DIFFERENTIAL TYPE: ABNORMAL
DIRECT EXAM: NORMAL
DIRECT EXAM: NORMAL
EOSINOPHIL FLUID: NORMAL %
EOSINOPHILS RELATIVE PERCENT: 1 % (ref 1–4)
ESTIMATED AVERAGE GLUCOSE: 131 MG/DL
FLUID DIFF COMMENT: NORMAL
GLUCOSE BLD-MCNC: 142 MG/DL (ref 75–110)
GLUCOSE BLD-MCNC: 158 MG/DL (ref 75–110)
GLUCOSE BLD-MCNC: 171 MG/DL (ref 75–110)
GLUCOSE BLD-MCNC: 175 MG/DL (ref 75–110)
HBA1C MFR BLD: 6.2 % (ref 4–6)
HCT VFR BLD CALC: 30.9 % (ref 41–53)
HEMOGLOBIN: 10.3 G/DL (ref 13.5–17.5)
IMMATURE GRANULOCYTES: ABNORMAL %
LYMPHOCYTES # BLD: 20 % (ref 24–44)
LYMPHOCYTES, BODY FLUID: 4 %
Lab: NORMAL
MCH RBC QN AUTO: 28 PG (ref 26–34)
MCHC RBC AUTO-ENTMCNC: 33.2 G/DL (ref 31–37)
MCV RBC AUTO: 84.2 FL (ref 80–100)
MONOCYTE, FLUID: NORMAL %
MONOCYTES # BLD: 7 % (ref 1–7)
NEUTROPHIL, FLUID: 3 %
NRBC AUTOMATED: ABNORMAL PER 100 WBC
OTHER CELLS FLUID: NORMAL %
PDW BLD-RTO: 14.6 % (ref 11.5–14.5)
PLATELET # BLD: 93 K/UL (ref 130–400)
PLATELET ESTIMATE: ABNORMAL
PMV BLD AUTO: ABNORMAL FL (ref 6–12)
RBC # BLD: 3.67 M/UL (ref 4.5–5.9)
RBC # BLD: ABNORMAL 10*6/UL
RBC FLUID: 5000 /MM3
SEG NEUTROPHILS: 72 % (ref 36–66)
SEGMENTED NEUTROPHILS ABSOLUTE COUNT: 8.42 K/UL (ref 1.8–7.7)
SPECIMEN DESCRIPTION: NORMAL
SPECIMEN TYPE: NORMAL
STATUS: NORMAL
WBC # BLD: 11.7 K/UL (ref 3.5–11)
WBC # BLD: ABNORMAL 10*3/UL
WBC FLUID: 1068 /MM3

## 2018-01-19 PROCEDURE — 2700000000 HC OXYGEN THERAPY PER DAY

## 2018-01-19 PROCEDURE — 82947 ASSAY GLUCOSE BLOOD QUANT: CPT

## 2018-01-19 PROCEDURE — 6370000000 HC RX 637 (ALT 250 FOR IP): Performed by: INTERNAL MEDICINE

## 2018-01-19 PROCEDURE — 2060000000 HC ICU INTERMEDIATE R&B

## 2018-01-19 PROCEDURE — 71045 X-RAY EXAM CHEST 1 VIEW: CPT

## 2018-01-19 PROCEDURE — 6360000002 HC RX W HCPCS: Performed by: INTERNAL MEDICINE

## 2018-01-19 PROCEDURE — 6360000002 HC RX W HCPCS: Performed by: NURSE PRACTITIONER

## 2018-01-19 PROCEDURE — 6370000000 HC RX 637 (ALT 250 FOR IP): Performed by: FAMILY MEDICINE

## 2018-01-19 PROCEDURE — 36415 COLL VENOUS BLD VENIPUNCTURE: CPT

## 2018-01-19 PROCEDURE — 99223 1ST HOSP IP/OBS HIGH 75: CPT | Performed by: INTERNAL MEDICINE

## 2018-01-19 PROCEDURE — 85025 COMPLETE CBC W/AUTO DIFF WBC: CPT

## 2018-01-19 PROCEDURE — 94640 AIRWAY INHALATION TREATMENT: CPT

## 2018-01-19 PROCEDURE — 2580000003 HC RX 258: Performed by: INTERNAL MEDICINE

## 2018-01-19 PROCEDURE — 94760 N-INVAS EAR/PLS OXIMETRY 1: CPT

## 2018-01-19 PROCEDURE — 2500000003 HC RX 250 WO HCPCS: Performed by: INTERNAL MEDICINE

## 2018-01-19 RX ORDER — METHYLPREDNISOLONE SODIUM SUCCINATE 40 MG/ML
40 INJECTION, POWDER, LYOPHILIZED, FOR SOLUTION INTRAMUSCULAR; INTRAVENOUS EVERY 8 HOURS
Status: DISCONTINUED | OUTPATIENT
Start: 2018-01-20 | End: 2018-01-20

## 2018-01-19 RX ORDER — METHYLPREDNISOLONE SODIUM SUCCINATE 125 MG/2ML
60 INJECTION, POWDER, LYOPHILIZED, FOR SOLUTION INTRAMUSCULAR; INTRAVENOUS EVERY 8 HOURS
Status: DISCONTINUED | OUTPATIENT
Start: 2018-01-19 | End: 2018-01-19

## 2018-01-19 RX ORDER — PANTOPRAZOLE SODIUM 40 MG/1
40 TABLET, DELAYED RELEASE ORAL
Status: DISCONTINUED | OUTPATIENT
Start: 2018-01-20 | End: 2018-01-28

## 2018-01-19 RX ORDER — NICOTINE 21 MG/24HR
1 PATCH, TRANSDERMAL 24 HOURS TRANSDERMAL DAILY
Status: DISCONTINUED | OUTPATIENT
Start: 2018-01-19 | End: 2018-01-28 | Stop reason: HOSPADM

## 2018-01-19 RX ADMIN — MOMETASONE FUROATE AND FORMOTEROL FUMARATE DIHYDRATE 2 PUFF: 200; 5 AEROSOL RESPIRATORY (INHALATION) at 19:28

## 2018-01-19 RX ADMIN — CEFTRIAXONE SODIUM 1 G: 10 INJECTION, POWDER, FOR SOLUTION INTRAVENOUS at 00:02

## 2018-01-19 RX ADMIN — INSULIN LISPRO 1 UNITS: 100 INJECTION, SOLUTION INTRAVENOUS; SUBCUTANEOUS at 16:39

## 2018-01-19 RX ADMIN — Medication 2 MG: at 11:40

## 2018-01-19 RX ADMIN — ALBUTEROL SULFATE 2.5 MG: 2.5 SOLUTION RESPIRATORY (INHALATION) at 14:30

## 2018-01-19 RX ADMIN — Medication 10 ML: at 08:53

## 2018-01-19 RX ADMIN — METHYLPREDNISOLONE SODIUM SUCCINATE 60 MG: 125 INJECTION, POWDER, FOR SOLUTION INTRAMUSCULAR; INTRAVENOUS at 08:51

## 2018-01-19 RX ADMIN — METHYLPREDNISOLONE SODIUM SUCCINATE 60 MG: 125 INJECTION, POWDER, FOR SOLUTION INTRAMUSCULAR; INTRAVENOUS at 03:00

## 2018-01-19 RX ADMIN — FLUOXETINE 20 MG: 20 CAPSULE ORAL at 08:51

## 2018-01-19 RX ADMIN — ENOXAPARIN SODIUM 40 MG: 40 INJECTION SUBCUTANEOUS at 08:51

## 2018-01-19 RX ADMIN — ALBUTEROL SULFATE 2.5 MG: 2.5 SOLUTION RESPIRATORY (INHALATION) at 23:30

## 2018-01-19 RX ADMIN — Medication 2 MG: at 16:34

## 2018-01-19 RX ADMIN — Medication 2 MG: at 15:17

## 2018-01-19 RX ADMIN — Medication 2 MG: at 08:53

## 2018-01-19 RX ADMIN — ALBUTEROL SULFATE 2.5 MG: 2.5 SOLUTION RESPIRATORY (INHALATION) at 07:15

## 2018-01-19 RX ADMIN — TIOTROPIUM BROMIDE 18 MCG: 18 CAPSULE ORAL; RESPIRATORY (INHALATION) at 07:15

## 2018-01-19 RX ADMIN — INSULIN LISPRO 1 UNITS: 100 INJECTION, SOLUTION INTRAVENOUS; SUBCUTANEOUS at 13:02

## 2018-01-19 RX ADMIN — Medication 2 MG: at 19:45

## 2018-01-19 RX ADMIN — Medication 10 ML: at 19:45

## 2018-01-19 RX ADMIN — ALBUTEROL SULFATE 2.5 MG: 2.5 SOLUTION RESPIRATORY (INHALATION) at 11:02

## 2018-01-19 RX ADMIN — INSULIN LISPRO 1 UNITS: 100 INJECTION, SOLUTION INTRAVENOUS; SUBCUTANEOUS at 21:53

## 2018-01-19 RX ADMIN — ALBUTEROL SULFATE 2.5 MG: 2.5 SOLUTION RESPIRATORY (INHALATION) at 19:28

## 2018-01-19 RX ADMIN — VANCOMYCIN 1500 MG: 1 INJECTION, SOLUTION INTRAVENOUS at 21:54

## 2018-01-19 RX ADMIN — MOMETASONE FUROATE AND FORMOTEROL FUMARATE DIHYDRATE 2 PUFF: 200; 5 AEROSOL RESPIRATORY (INHALATION) at 07:16

## 2018-01-19 RX ADMIN — ALBUTEROL SULFATE 2.5 MG: 2.5 SOLUTION RESPIRATORY (INHALATION) at 03:50

## 2018-01-19 RX ADMIN — INSULIN LISPRO 1 UNITS: 100 INJECTION, SOLUTION INTRAVENOUS; SUBCUTANEOUS at 08:43

## 2018-01-19 RX ADMIN — Medication 2 MG: at 21:16

## 2018-01-19 RX ADMIN — AZITHROMYCIN MONOHYDRATE 500 MG: 500 INJECTION, POWDER, LYOPHILIZED, FOR SOLUTION INTRAVENOUS at 01:45

## 2018-01-19 RX ADMIN — METHYLPREDNISOLONE SODIUM SUCCINATE 60 MG: 125 INJECTION, POWDER, FOR SOLUTION INTRAMUSCULAR; INTRAVENOUS at 16:31

## 2018-01-19 ASSESSMENT — PAIN DESCRIPTION - FREQUENCY
FREQUENCY: INTERMITTENT
FREQUENCY: INTERMITTENT

## 2018-01-19 ASSESSMENT — PAIN DESCRIPTION - PAIN TYPE
TYPE: ACUTE PAIN

## 2018-01-19 ASSESSMENT — PAIN DESCRIPTION - DESCRIPTORS
DESCRIPTORS: ACHING
DESCRIPTORS: STABBING

## 2018-01-19 ASSESSMENT — PAIN SCALES - GENERAL
PAINLEVEL_OUTOF10: 9
PAINLEVEL_OUTOF10: 6
PAINLEVEL_OUTOF10: 9
PAINLEVEL_OUTOF10: 5
PAINLEVEL_OUTOF10: 8
PAINLEVEL_OUTOF10: 8

## 2018-01-19 ASSESSMENT — PAIN DESCRIPTION - ORIENTATION
ORIENTATION: MID
ORIENTATION: MID

## 2018-01-19 ASSESSMENT — PAIN DESCRIPTION - LOCATION
LOCATION: NECK
LOCATION: BACK
LOCATION: BACK

## 2018-01-19 ASSESSMENT — PAIN DESCRIPTION - ONSET
ONSET: ON-GOING
ONSET: AWAKENED FROM SLEEP

## 2018-01-19 ASSESSMENT — PAIN DESCRIPTION - PROGRESSION
CLINICAL_PROGRESSION: NOT CHANGED
CLINICAL_PROGRESSION: NOT CHANGED

## 2018-01-19 NOTE — PROGRESS NOTES
aeration []  Faint, scattered wheezing, good aeration []  Expiratory Wheezing and or moderately diminished [x]  Insp/Exp wheeze and/or very diminished []  Insp/Exp and/ or marked distress   Respiratory Rate   []  Patient Baseline []  Less than 20 []  Less than 20 [x]  20-25 []  Greater than 25 []  Greater than 25   Peak flow % of Pred or PB []  NA   []  Greater than 90%  []  81-90% []  71-80% [x]  Less than or equal to 70%  or unable to perform []  Unable due to Respiratory Distress   Dyspnea re []  Patient Baseline []  No SOB []  No SOB []  SOB on exertion [x]  SOB min activity []  At rest/acute   e FEV% Predicted       []  NA []  Above 69%  []  Unable []  Above 60-69%  []  Unable []  Above 50-59%  []  Unable []  Above 35-49%  [x]  Unable []  Less than 35%  []  Unable                 []  Hyperinflation Assessment  Score 1 2 3   CXR and Breath Sounds   []  Clear []  No atelectasis  Basilar aeration []  Atelectasis or absent basilar breath sounds   Incentive Spirometry Volume  (Per IBW)   []  Greater than or equal to 15ml/Kg []  less than 15ml/Kg []  less than 15ml/Kg   Surgery within last 2 weeks []  None or general   []  Abdominal or thoracic surgery  []  Abdominal or thoracic   Chronic Pulmonary Historyre []  No []  Yes []  Yes     []  Secretion Management Assessment  Score 1 2 3   Bilateral Breath Sounds   []  Occasional Rhonchi []  Scattered Rhonchi []  Course Rhonchi and/or poor aeration   Sputum    []  Small amount of thin secretions []  Moderate amount of viscous secretions []  Copius, Viscious Yellow/ Secretions   CXR as reported by physician []  clear  []  Unavailable []  Infiltrates and/or consolidation  []  Unavailable []  Mucus Plugging and or lobar consolidation  []  Unavailable   Cough []  Strong, productive cough []  Weak productive cough []  No cough or weak non-productive cough

## 2018-01-19 NOTE — PROGRESS NOTES
Recommendations:  · 3 liters/min via nasal cannula  · BiPAP as needed  · Continue IV antibiotics: Zithromax/Rocephin/Zosyn  · IV solu-medrol 60 mg every 8 hours  · Albuterol Q 4 hours and prn  · Dulera 200  · Spiriva  · Oncology has been consulted  · Pain control  · DVT prophylaxis with low molecular weight heparin  · Will follow with you    Electronically signed by     Governor [place] CNP on 1/19/2018 at 10:34 AM  Patient was seen under the supervision of Dr. Wilberto Paredes and Sleep Medicine,    Patient seen by me. He is sitting up in the bed comfortably. His breathing is improving. Denies chest pain. He has occasional cough with white sputum. Lung exam reveals rhonchi and no wheezing. Plan is to continue BiPAP when necessary, IV antibiotics, IV steroids. Continue bronchodilators. Await fluid cytology. Above was reviewed and discussed with Kristy Lopez CNP. And I agree with assessment and plan of care.   Electronically signed by     Michele Barclay MD on 1/19/2018 at 2:17 PM  Pulmonary Critical Care and Sleep Medicine,  [facility]  Cell: [phone]  Office: [phone]

## 2018-01-20 PROBLEM — R73.03 PREDIABETES: Status: ACTIVE | Noted: 2018-01-20

## 2018-01-20 LAB
GLUCOSE BLD-MCNC: 132 MG/DL (ref 75–110)
GLUCOSE BLD-MCNC: 133 MG/DL (ref 75–110)
GLUCOSE BLD-MCNC: 141 MG/DL (ref 75–110)
GLUCOSE BLD-MCNC: 165 MG/DL (ref 75–110)

## 2018-01-20 PROCEDURE — 6370000000 HC RX 637 (ALT 250 FOR IP): Performed by: FAMILY MEDICINE

## 2018-01-20 PROCEDURE — 6370000000 HC RX 637 (ALT 250 FOR IP): Performed by: INTERNAL MEDICINE

## 2018-01-20 PROCEDURE — 94640 AIRWAY INHALATION TREATMENT: CPT

## 2018-01-20 PROCEDURE — 2580000003 HC RX 258: Performed by: INTERNAL MEDICINE

## 2018-01-20 PROCEDURE — 6360000002 HC RX W HCPCS: Performed by: INTERNAL MEDICINE

## 2018-01-20 PROCEDURE — 2700000000 HC OXYGEN THERAPY PER DAY

## 2018-01-20 PROCEDURE — 99232 SBSQ HOSP IP/OBS MODERATE 35: CPT | Performed by: INTERNAL MEDICINE

## 2018-01-20 PROCEDURE — 2500000003 HC RX 250 WO HCPCS: Performed by: INTERNAL MEDICINE

## 2018-01-20 PROCEDURE — 99222 1ST HOSP IP/OBS MODERATE 55: CPT | Performed by: INTERNAL MEDICINE

## 2018-01-20 PROCEDURE — 94760 N-INVAS EAR/PLS OXIMETRY 1: CPT

## 2018-01-20 PROCEDURE — 82947 ASSAY GLUCOSE BLOOD QUANT: CPT

## 2018-01-20 PROCEDURE — 2060000000 HC ICU INTERMEDIATE R&B

## 2018-01-20 PROCEDURE — 6360000002 HC RX W HCPCS: Performed by: NURSE PRACTITIONER

## 2018-01-20 RX ORDER — METHYLPREDNISOLONE SODIUM SUCCINATE 40 MG/ML
40 INJECTION, POWDER, LYOPHILIZED, FOR SOLUTION INTRAMUSCULAR; INTRAVENOUS EVERY 12 HOURS
Status: DISCONTINUED | OUTPATIENT
Start: 2018-01-20 | End: 2018-01-22

## 2018-01-20 RX ADMIN — ALBUTEROL SULFATE 2.5 MG: 2.5 SOLUTION RESPIRATORY (INHALATION) at 12:36

## 2018-01-20 RX ADMIN — ENOXAPARIN SODIUM 30 MG: 30 INJECTION SUBCUTANEOUS at 20:19

## 2018-01-20 RX ADMIN — MOMETASONE FUROATE AND FORMOTEROL FUMARATE DIHYDRATE 2 PUFF: 200; 5 AEROSOL RESPIRATORY (INHALATION) at 07:26

## 2018-01-20 RX ADMIN — Medication 10 ML: at 20:20

## 2018-01-20 RX ADMIN — Medication 2 MG: at 00:03

## 2018-01-20 RX ADMIN — Medication 10 ML: at 08:47

## 2018-01-20 RX ADMIN — INSULIN LISPRO 1 UNITS: 100 INJECTION, SOLUTION INTRAVENOUS; SUBCUTANEOUS at 13:01

## 2018-01-20 RX ADMIN — MAGNESIUM HYDROXIDE 30 ML: 400 SUSPENSION ORAL at 08:47

## 2018-01-20 RX ADMIN — TAZOBACTAM SODIUM AND PIPERACILLIN SODIUM 3.38 G: 375; 3 INJECTION, SOLUTION INTRAVENOUS at 13:01

## 2018-01-20 RX ADMIN — TAZOBACTAM SODIUM AND PIPERACILLIN SODIUM 3.38 G: 375; 3 INJECTION, SOLUTION INTRAVENOUS at 20:19

## 2018-01-20 RX ADMIN — ALBUTEROL SULFATE 2.5 MG: 2.5 SOLUTION RESPIRATORY (INHALATION) at 20:45

## 2018-01-20 RX ADMIN — CEFTRIAXONE SODIUM 1 G: 10 INJECTION, POWDER, FOR SOLUTION INTRAVENOUS at 00:03

## 2018-01-20 RX ADMIN — PANTOPRAZOLE SODIUM 40 MG: 40 TABLET, DELAYED RELEASE ORAL at 08:42

## 2018-01-20 RX ADMIN — ENOXAPARIN SODIUM 40 MG: 40 INJECTION SUBCUTANEOUS at 08:41

## 2018-01-20 RX ADMIN — Medication 2 MG: at 23:30

## 2018-01-20 RX ADMIN — METHYLPREDNISOLONE SODIUM SUCCINATE 40 MG: 40 INJECTION, POWDER, FOR SOLUTION INTRAMUSCULAR; INTRAVENOUS at 20:19

## 2018-01-20 RX ADMIN — FLUOXETINE 20 MG: 20 CAPSULE ORAL at 08:42

## 2018-01-20 RX ADMIN — ALBUTEROL SULFATE 2.5 MG: 2.5 SOLUTION RESPIRATORY (INHALATION) at 07:25

## 2018-01-20 RX ADMIN — TIOTROPIUM BROMIDE 18 MCG: 18 CAPSULE ORAL; RESPIRATORY (INHALATION) at 07:26

## 2018-01-20 RX ADMIN — ALBUTEROL SULFATE 2.5 MG: 2.5 SOLUTION RESPIRATORY (INHALATION) at 15:36

## 2018-01-20 RX ADMIN — METHYLPREDNISOLONE SODIUM SUCCINATE 40 MG: 40 INJECTION, POWDER, FOR SOLUTION INTRAMUSCULAR; INTRAVENOUS at 08:42

## 2018-01-20 RX ADMIN — ALBUTEROL SULFATE 2.5 MG: 2.5 SOLUTION RESPIRATORY (INHALATION) at 03:26

## 2018-01-20 RX ADMIN — MOMETASONE FUROATE AND FORMOTEROL FUMARATE DIHYDRATE 2 PUFF: 200; 5 AEROSOL RESPIRATORY (INHALATION) at 20:45

## 2018-01-20 RX ADMIN — METHYLPREDNISOLONE SODIUM SUCCINATE 40 MG: 40 INJECTION, POWDER, FOR SOLUTION INTRAMUSCULAR; INTRAVENOUS at 00:20

## 2018-01-20 ASSESSMENT — PAIN SCALES - GENERAL
PAINLEVEL_OUTOF10: 8
PAINLEVEL_OUTOF10: 9

## 2018-01-20 NOTE — PROGRESS NOTES
Follow-up COPD  Less short of breath less tachypnea no PND no orthopnea  Review of system  No fever no chills no GI/ complaints no cardiac complaints, no TIA no bleeding, no polyuria no polydipsia no hypoglycemia, no sinus pain no sore throat no skin lesions  Vitals stable  Eyes no pallor no jaundice  Neck supple no JVD  Lungs air entry equal bilateral rhonchi decreased at the bases with crackles  Heart regular rate and rhythm no gallop  Abdomen soft possible sounds without any tenderness without any megaly  Extremities good pulses without edema negative Homans sign  Neuro alert oriented ×3 with no focal deficit  Assessment and plan  COPD exacerbation  Pneumonia versus lymphangitic spread of the tumor  Pleural fluid positive for gram-positive cocci in clusters start vancomycin to see Zithromax  Prediabetes  Metastatic adenocarcinoma unknown primary  Chronic respiratory failure  Meds labs reviewed continue with DVT prophylaxis continue steroids and IV antibiotics bronchodilators will ask ID to see continue with insulin coverage long discussion with the family and patient in presence of the oncologist alt questions answered

## 2018-01-20 NOTE — CONSULTS
(36.7 °C), Min:97.7 °F (36.5 °C), Max:98.4 °F (36.9 °C)      General appearance - ill appearing, not in pain but has mild respiratory distress  Mental status - good mood, alert and oriented  Eyes - pupils equal and reactive, extraocular eye movements intact  Ears - bilateral TM's and external ear canals normal  Nose - normal and patent, no erythema, discharge or polyps  Mouth - mucous membranes moist, pharynx normal without lesions  Neck - supple, no significant adenopathy  Lymphatics - no palpable lymphadenopathy, no hepatosplenomegaly  Chest -decreased air entry with scattered rhonchi  Heart - normal rate, regular rhythm, normal S1, S2, no murmurs, rubs, clicks or gallops  Abdomen - soft, nontender, nondistended, no masses or organomegaly  Neurological - alert, oriented, normal speech, no focal findings or movement disorder noted  Musculoskeletal - no joint tenderness, deformity or swelling  Extremities - peripheral pulses normal, no pedal edema, no clubbing or cyanosis  Skin - normal coloration and turgor, no rashes, no suspicious skin lesions noted           DATA:      Labs:       CBC:   Recent Labs      01/18/18   0423  01/19/18   0747   WBC  10.6  11.7*   HGB  10.2*  10.3*   HCT  30.2*  30.9*   PLT  79*  93*     BMP:   Recent Labs      01/18/18   0423  01/18/18   2133   NA  136  143   K  4.2  3.8   CO2  33*  31   BUN  14  17   CREATININE  0.61*  0.69*   LABGLOM  >60  >60   GLUCOSE  211*  225*     PT/INR:   Recent Labs      01/18/18   1205   PROTIME  10.9   INR  1.1     APTT:  Recent Labs      01/18/18   1205   APTT  30.3     LIVER PROFILE:  Recent Labs      01/18/18   0423  01/18/18   2133   AST  29  30   ALT  30  32   LABALBU  3.2*  3.3*            (See actual reports for details)      IMPRESSION:    Primary Problem  <principal problem not specified>    Active Hospital Problems    Diagnosis Date Noted    [facility] [J44.1] 01/17/2018   Diffuse stage IV metastatic adenocarcinoma on bone marrow

## 2018-01-20 NOTE — CONSULTS
lobes right greater than   left along with a new band in the right lower lobe which may represent acute   pneumonitis although remains nonspecific. 4. Stable diffuse interlobular septal thickening.  Possibilities include   pulmonary edema versus lymphangitic carcinomatosis. 5. Diffuse osseous metastasis again demonstrated. SINGLE VIEW OF THE CHEST 1/19/2018 6:33 am  Impression   1. Bilateral pleural effusions, left greater than right, suspected loculated   left. 2. Similar left basilar airspace disease. 3. Similar findings suggesting mild vascular congestion.  Atypical   pneumonitis is not excluded.  Continued imaging follow-up is recommended. Cultures: Body Fluid Culture [[id number]] (Abnormal) Collected: 01/18/18 1529   Order Status: Completed Specimen: Pleural Fluid Updated: 01/20/18 1000    Specimen Description . PLEURAL FLUID Performed at [address]    Specimen Description [address] [phone]    Special Requests NOT REPORTED    Direct Exam FEW NEUTROPHILS (A)    Direct Exam NO BACTERIA SEEN    Direct Exam Gram stain made from cytocentrifuged specimen.  Organisms and cells will be    Direct Exam  concentrated. Culture POSITIVE FLUID CULTURE, RN NOTIFIED CHERYL CURRIE AT 1409 ON 1/19/18 (A)    Culture DIRECT GRAM STAIN FROM BOTTLE: GRAM POSITIVE COCCI IN CLUSTERS (A)    Culture STAPHYLOCOCCUS SPECIES, COAGULASE NEGATIVE (A)    Culture Performed at [facility] [address], [address] [phone]    Status Pending     Cult,Blood #1 [[id number]] Collected: 01/17/18 1600   Order Status: Completed Specimen: Blood Updated: 01/20/18 0017    Specimen Description . BLOOD 8ML LT AC Performed at [address]    Specimen Description [address] [phone]    Special Requests NOT REPORTED    Culture NO GROWTH 3 DAYS    Culture Performed at [facility] [address], [address] [phone]    Status Pending   Cult,Blood #1 [[id number]] Collected: 01/17/18 1615   Order Status: Completed Specimen: Blood Updated: 01/20/18 0017    Specimen Description . BLOOD 10ML RTA Performed at [address]    Specimen Description [address] [phone]    Special Requests NOT REPORTED    Culture NO GROWTH 3 DAYS    Culture Performed at [facility] [address], [address] [phone]    Status Pending     Acid Fast Culture With Smear [[id number]] Collected: 01/18/18 1943   Order Status: Completed Updated: 01/19/18 1056    Specimen Description . PLEURAL FLUID    Special Requests NOT REPORTED    Direct Exam NO ACID FAST BACILLI SEEN (CONCENTRATED SMEAR)    Direct Exam Performed at [facility] [address], [address] [phone]    Culture Pending    Status Pending   Fungal stain [[id number]] Collected: 01/18/18 1530   Order Status: Completed Specimen: Pleural Fluid Updated: 01/19/18 1054    Specimen Description . PLEURAL FLUID Performed at [address]    Specimen Description [address] [phone]    Special Requests NOT REPORTED    Direct Exam NO FUNGAL ELEMENTS SEEN    Direct Exam Performed at [facility] [address], [address] [phone]    Status FINAL 01/19/2018         Thank you for allowing us to participate in the care of this patient. Please call with questions. Electronically signed by Wil Russ MD on 1/20/2018 at 10:47 AM    Wil Russ MD  [employer]  OFFICE: [phone]    This note is created with the assistance of a speech recognition program.  While intending to generate a document that actually reflects the content of the visit, the document can still have some errors including those of syntax and sound a like substitutions which may escape proof reading.   It such instances, actual meaning can be extrapolated

## 2018-01-20 NOTE — PROGRESS NOTES
Progress Note               Date:                           1/20/2018  Patient name:           Coni Eldridge  Date of admission:  1/17/2018  2:44 PM  MRN:   [id number]  YOB: 1955  PCP:                           Neo Hickey MD        Subjective:   Patient seen and examined  SOB better  Had thoracentesis  Pain in neck  has cough with sputum  No fever chills   NO NV     HPI in Brief:   The patient is a 60 y.o.  male who is admitted to the hospital for further management of increasing shortness of breath and possible pneumonia. The patient had increasing shortness of breath. He was seen by pulmonary in the office with Dr Edmundo Sesay and was referred to the hospital.  He has shortness of breath and increasing cough. He had no hemoptysis. He had back pain. The patient was recently diagnosed with adenocarcinoma based on bone marrow biopsy. He originally presented in December 2017 to the hospital with pulmonary symptoms. He had pleural effusion which was aspirated and was negative. Imaging at that time however showed evidence of diffuse bone metastasis. PET CT scan confirmed bony metastasis but no primary cancer was identified. Bone marrow         biopsy was positive for adenocarcinoma. Primary was not identified. His PSA was normal.  Prostate cancer as a primary was felt to be less likely. Most likely dealing with lung primary. On admission patient had aspiration of right pleuritic fluid. Pathology still pending.       Problem Lists:   Primary Problem:  <principal problem not specified>   Current Problems:  Active Hospital Problems    Diagnosis Date Noted    Pleural effusion [J90]     Adenocarcinoma of unknown origin ([facility] [C80.1]     Bone metastasis (HCC) [C79.51]     COPD exacerbation ([facility] [J44.1] 01/17/2018     PMH:  Past Medical History:   Diagnosis Date    Chronic fatigue     COPD (chronic obstructive pulmonary disease) ([facility]     Depression     Metastatic adenocarcinoma to bone marrow (HCC)       Allergies: No Known Allergies     Medications:   Scheduled Meds:   piperacillin-tazobactam  3.375 g Intravenous Q8H    pantoprazole  40 mg Oral QAM AC    nicotine  1 patch Transdermal Daily    methylPREDNISolone  40 mg Intravenous Q8H    sodium chloride flush  10 mL Intravenous 2 times per day    mometasone-formoterol  2 puff Inhalation Q12H    insulin lispro  0-6 Units Subcutaneous TID WC    insulin lispro  0-3 Units Subcutaneous Nightly    albuterol  2.5 mg Nebulization Q4H    FLUoxetine  20 mg Oral Daily    tiotropium  18 mcg Inhalation Daily    enoxaparin  40 mg Subcutaneous Daily     PRN Medications: sodium chloride flush, magnesium hydroxide, ondansetron, morphine, albuterol, glucose, dextrose, glucagon (rDNA), dextrose, acetaminophen  Continuous Infusions:   dextrose         Objective:   Vitals: BP (!) 146/57   Pulse 107   Temp 97.7 °F (36.5 °C) (Oral)   Resp 24   Ht 6' 1\" (1.854 m)   Wt 228 lb (103.4 kg)   SpO2 96%   BMI 30.08 kg/m²   General appearance - ill appearing, not in pain but has mild respiratory distress  Mental status - good mood, alert and oriented  Eyes - pupils equal and reactive, extraocular eye movements intact  Ears - bilateral TM's and external ear canals normal  Nose - normal and patent, no erythema, discharge or polyps  Mouth - mucous membranes moist, pharynx normal without lesions  Neck - supple, no significant adenopathy  Lymphatics - no palpable lymphadenopathy, no hepatosplenomegaly  Chest -decreased air entry with scattered rhonchi  Heart - normal rate, regular rhythm, normal S1, S2, no murmurs, rubs, clicks or gallops  Abdomen - soft, nontender, nondistended, no masses or organomegaly  Neurological - alert, oriented, normal speech, no focal findings or movement disorder noted  Musculoskeletal - no joint tenderness, deformity or swelling  Extremities - peripheral pulses normal, no pedal edema, no clubbing or cyanosis    Data:  No intake/output data recorded. In: 480 [P.O.:480]  Out: -   CBC:   Recent Labs      01/17/18   1506  01/18/18   0423  01/19/18   0747   WBC  10.2  10.6  11.7*   HGB  11.8*  10.2*  10.3*   PLT  93*  79*  93*     BMP:    Recent Labs      01/17/18   1506  01/18/18   0423  01/18/18   2133   NA  142  136  143   K  3.9  4.2  3.8   CL  100  94*  101   CO2  30  33*  31   BUN  15  14  17   CREATININE  0.70  0.61*  0.69*   GLUCOSE  163*  211*  225*     Hepatic:   Recent Labs      01/18/18   0423  01/18/18   2133   AST  29  30   ALT  30  32   BILITOT  0.60  0.37   ALKPHOS  1,064*  971*     INR:   Recent Labs      01/18/18   1205   INR  1.1     IMAGING DATA:  CT chest 1/17/18  Impression   1. No evidence for acute pulmonary embolism.  Some artifacts decrease   sensitivity the segmental and subsegmental branches. 2. Worsening left pleural, stable right pleural effusion. 3. Worsening discrete and confluent geographic appearing irregular   ground-glass attenuation particularly in the upper lobes right greater than   left along with a new band in the right lower lobe which may represent acute   pneumonitis although remains nonspecific. 4. Stable diffuse interlobular septal thickening.  Possibilities include   pulmonary edema versus lymphangitic carcinomatosis. 5. Diffuse osseous metastasis again demonstrated     Assessment    1. Diffuse stage IV metastatic adenocarcinoma on bone marrow biopsy with probable lung primary. 2. Diffuse bone metastasis  3. Chronic respiratory failure  4. COPD  5. Interstitial lung disease  6. Possible pneumonia versus Lymphangitic Spread on CT Scan    Plan     1. Continue supportive management  2. Once infection is controlled, patient will need to be treated as o/p likely as adenocarcinoma of unknown primary/lung. 3. He will follow with Dr Ahsan Chan to discuss this as o/p  4.  Patient's questions were answered to the best of his satisfaction and he verbalized full understanding

## 2018-01-20 NOTE — PROGRESS NOTES
Assessment  BRONCHODILATOR ASSESSMENT SCORE  Score 0 1 2 3 4 5   Breath Sounds   []  Patient Baseline []  No Wheeze good aeration []  Faint, scattered wheezing, good aeration []  Expiratory Wheezing and or moderately diminished []  Insp/Exp wheeze and/or very diminished []  Insp/Exp and/ or marked distress   Respiratory Rate   []  Patient Baseline []  Less than 20 []  Less than 20 []  20-25 []  Greater than 25 []  Greater than 25   Peak flow % of Pred or PB []  NA   []  Greater than 90%  []  81-90% []  71-80% []  Less than or equal to 70%  or unable to perform []  Unable due to Respiratory Distress   Dyspnea re []  Patient Baseline []  No SOB []  No SOB []  SOB on exertion []  SOB min activity []  At rest/acute   e FEV% Predicted       []  NA []  Above 69%  []  Unable []  Above 60-69%  []  Unable []  Above 50-59%  []  Unable []  Above 35-49%  []  Unable []  Less than 35%  []  Unable                 []  Hyperinflation Assessment  Score 1 2 3   CXR and Breath Sounds   []  Clear []  No atelectasis  Basilar aeration []  Atelectasis or absent basilar breath sounds   Incentive Spirometry Volume  (Per IBW)   []  Greater than or equal to 15ml/Kg []  less than 15ml/Kg []  less than 15ml/Kg   Surgery within last 2 weeks []  None or general   []  Abdominal or thoracic surgery  []  Abdominal or thoracic   Chronic Pulmonary Historyre []  No []  Yes []  Yes     []  Secretion Management Assessment  Score 1 2 3   Bilateral Breath Sounds   []  Occasional Rhonchi []  Scattered Rhonchi []  Course Rhonchi and/or poor aeration   Sputum    []  Small amount of thin secretions []  Moderate amount of viscous secretions []  Copius, Viscious Yellow/ Secretions   CXR as reported by physician []  clear  []  Unavailable []  Infiltrates and/or consolidation  []  Unavailable []  Mucus Plugging and or lobar consolidation  []  Unavailable   Cough []  Strong, productive cough []  Weak productive cough []  No cough or weak non-productive cough

## 2018-01-20 NOTE — PROGRESS NOTES
Subjective:     Follow-up COPD  Less shortness of breath no tachypnea no PND no orthopnea less cough    ROS  No fever no chills no GI/ complaints no cardiac complaints, no TIA no bleeding, no polyuria no polydipsia no hypoglycemia no headaches no sinus pain  No sore throat  physical exam  General Appearance: alert and oriented to person, place and time and in no acute distress  Skin: warm and dry, no rash or erythema  Head: normocephalic and atraumatic  Eyes: pupils equal, round, and reactive to light, conjunctivae normal and sclera anicteric    Neck: neck supple and non tender without mass   Pulmonary/Chest: Air entry equal decreased at the bases a few rhonchi no use of intercostal muscles  Cardiovascular: normal rate, regular rhythm, normal S1 and S2, no gallops, intact distal pulses and no carotid bruits  Abdomen: soft, non-tender, non-distended, normal bowel sounds, no masses or organomegaly  Extremities: no edema and good pulses no Chetna sign    Neurologic: Alert oriented ×3 with no focal DEF    BP (!) 146/57   Pulse 107   Temp 97.7 °F (36.5 °C) (Oral)   Resp 24   Ht 6' 1\" (1.854 m)   Wt 228 lb (103.4 kg)   SpO2 96%   BMI 30.08 kg/m²     CBC:   Lab Results   Component Value Date    WBC 11.7 01/19/2018    RBC 3.67 01/19/2018    HGB 10.3 01/19/2018    HCT 30.9 01/19/2018    MCV 84.2 01/19/2018    MCH 28.0 01/19/2018    MCHC 33.2 01/19/2018    RDW 14.6 01/19/2018    PLT 93 01/19/2018    MPV NOT REPORTED 01/19/2018     CMP:    Lab Results   Component Value Date     01/18/2018    K 3.8 01/18/2018     01/18/2018    CO2 31 01/18/2018    BUN 17 01/18/2018    CREATININE 0.69 01/18/2018    GFRAA >60 01/18/2018    LABGLOM >60 01/18/2018    GLUCOSE 225 01/18/2018    PROT 6.4 01/18/2018    LABALBU 3.3 01/18/2018    CALCIUM 8.6 01/18/2018    BILITOT 0.37 01/18/2018    ALKPHOS 971 01/18/2018    AST 30 01/18/2018    ALT 32 01/18/2018        Assessment:  Patient Active Problem List   Diagnosis    Pneumonia

## 2018-01-20 NOTE — PROGRESS NOTES
Pulmonary Critical Care Progress Note  Campos Middleton CNP / Dr. Juanita Camarillo M.D. Patient seen for the follow up of Healthcare associated pneumonia    Subjective:  He feels that shortness of breath is slightly better compared to yesterday though not back to his baseline. He continues to have a frequent cough. Intermittent generalized pain though none at this time. Examination:  Vitals: BP (!) 146/57   Pulse 107   Temp 97.7 °F (36.5 °C) (Oral)   Resp 24   Ht 6' 1\" (1.854 m)   Wt 228 lb (103.4 kg)   SpO2 96%   BMI 30.08 kg/m²     General appearance: alert and cooperative with exam, no distress  Neck: No JVD  Lungs: Decreased air exchange, + rhonchi, no wheeze  Heart: regular rate and rhythm, S1, S2 normal, no gallop  Abdomen: Soft, non tender, + BS  Extremities: no cyanosis or clubbing.  No significant edema    LABs:  CBC:   Recent Labs      01/18/18   0423  01/19/18   0747   WBC  10.6  11.7*   HGB  10.2*  10.3*   HCT  30.2*  30.9*   PLT  79*  93*     BMP:   Recent Labs      01/18/18   0423  01/18/18   2133   NA  136  143   K  4.2  3.8   CO2  33*  31   BUN  14  17   CREATININE  0.61*  0.69*   LABGLOM  >60  >60   GLUCOSE  211*  225*     PT/INR:   Recent Labs      01/18/18   1205   PROTIME  10.9   INR  1.1     APTT:  Recent Labs      01/18/18   1205   APTT  30.3     LIVER PROFILE:  Recent Labs      01/18/18   0423  01/18/18   2133   AST  29  30   ALT  30  32   LABALBU  3.2*  3.3*     Impression:  · Chronic respiratory failure with hypoxia   · Healthcare associated pneumonia   · Acute exacerbation of COPD  · Interstitial lung disease  · Small bilateral pleural effusions/Atelectasis  · Recent Left-sided thoracentesis on 12/27/17, fluid negative for malignancy  · Right-sided thoracentesis on 1/19/18 with cytology pending  · New diagnosis of adenocarcinoma per bone marrow biopsy, with concern for lung as primary  · Smoking history     Recommendations:  · Await cytology  · 3 liters/min via nasal

## 2018-01-21 ENCOUNTER — APPOINTMENT (OUTPATIENT)
Dept: GENERAL RADIOLOGY | Age: 63
DRG: 193 | End: 2018-01-21
Payer: COMMERCIAL

## 2018-01-21 ENCOUNTER — APPOINTMENT (OUTPATIENT)
Dept: CT IMAGING | Age: 63
DRG: 193 | End: 2018-01-21
Payer: COMMERCIAL

## 2018-01-21 PROBLEM — J96.11 CHRONIC RESPIRATORY FAILURE WITH HYPOXIA (HCC): Status: ACTIVE | Noted: 2018-01-21

## 2018-01-21 LAB
ABSOLUTE EOS #: 0 K/UL (ref 0–0.4)
ABSOLUTE IMMATURE GRANULOCYTE: ABNORMAL K/UL (ref 0–0.3)
ABSOLUTE LYMPH #: 2.25 K/UL (ref 1–4.8)
ABSOLUTE MONO #: 0.54 K/UL (ref 0.2–0.8)
ANION GAP SERPL CALCULATED.3IONS-SCNC: 12 MMOL/L (ref 9–17)
BASOPHILS # BLD: 0 %
BASOPHILS ABSOLUTE: 0 K/UL (ref 0–0.2)
BNP INTERPRETATION: ABNORMAL
BUN BLDV-MCNC: 21 MG/DL (ref 8–23)
BUN/CREAT BLD: 31 (ref 9–20)
CALCIUM SERPL-MCNC: 8.3 MG/DL (ref 8.6–10.4)
CHLORIDE BLD-SCNC: 98 MMOL/L (ref 98–107)
CO2: 30 MMOL/L (ref 20–31)
CREAT SERPL-MCNC: 0.67 MG/DL (ref 0.7–1.2)
CULTURE: ABNORMAL
DIFFERENTIAL TYPE: ABNORMAL
DIRECT EXAM: ABNORMAL
EOSINOPHILS RELATIVE PERCENT: 0 % (ref 1–4)
FIO2: 40
GFR AFRICAN AMERICAN: >60 ML/MIN
GFR NON-AFRICAN AMERICAN: >60 ML/MIN
GFR SERPL CREATININE-BSD FRML MDRD: ABNORMAL ML/MIN/{1.73_M2}
GFR SERPL CREATININE-BSD FRML MDRD: ABNORMAL ML/MIN/{1.73_M2}
GLUCOSE BLD-MCNC: 134 MG/DL (ref 75–110)
GLUCOSE BLD-MCNC: 137 MG/DL (ref 75–110)
GLUCOSE BLD-MCNC: 142 MG/DL (ref 75–110)
GLUCOSE BLD-MCNC: 149 MG/DL (ref 75–110)
GLUCOSE BLD-MCNC: 153 MG/DL (ref 70–99)
HCT VFR BLD CALC: 31.2 % (ref 41–53)
HEMOGLOBIN: 10.4 G/DL (ref 13.5–17.5)
IMMATURE GRANULOCYTES: ABNORMAL %
LYMPHOCYTES # BLD: 21 % (ref 24–44)
Lab: ABNORMAL
MCH RBC QN AUTO: 28 PG (ref 26–34)
MCHC RBC AUTO-ENTMCNC: 33.5 G/DL (ref 31–37)
MCV RBC AUTO: 83.7 FL (ref 80–100)
METAMYELOCYTES ABSOLUTE COUNT: 0.21 K/UL
METAMYELOCYTES: 2 %
MONOCYTES # BLD: 5 % (ref 1–7)
MORPHOLOGY: ABNORMAL
MYELOCYTES ABSOLUTE COUNT: 0.43 K/UL
MYELOCYTES: 4 %
NEGATIVE BASE EXCESS, ART: ABNORMAL (ref 0–2)
NRBC AUTOMATED: ABNORMAL PER 100 WBC
NUCLEATED RED BLOOD CELLS: 8 PER 100 WBC
O2 DEVICE/FLOW/%: ABNORMAL
ORGANISM: ABNORMAL
PATIENT TEMP: ABNORMAL
PDW BLD-RTO: 14.9 % (ref 11.5–14.5)
PLATELET # BLD: 83 K/UL (ref 130–400)
PLATELET ESTIMATE: ABNORMAL
PMV BLD AUTO: ABNORMAL FL (ref 6–12)
POC HCO3: 34.2 MMOL/L (ref 22–27)
POC O2 SATURATION: 98 %
POC PCO2 TEMP: ABNORMAL MM HG
POC PCO2: 43 MM HG (ref 32–45)
POC PH TEMP: ABNORMAL
POC PH: 7.51 (ref 7.35–7.45)
POC PO2 TEMP: ABNORMAL MM HG
POC PO2: 93 MM HG (ref 75–95)
POSITIVE BASE EXCESS, ART: 11 (ref 0–2)
POTASSIUM SERPL-SCNC: 4.3 MMOL/L (ref 3.7–5.3)
PRO-BNP: 309 PG/ML
RBC # BLD: 3.73 M/UL (ref 4.5–5.9)
RBC # BLD: ABNORMAL 10*6/UL
SEG NEUTROPHILS: 68 % (ref 36–66)
SEGMENTED NEUTROPHILS ABSOLUTE COUNT: 7.27 K/UL (ref 1.8–7.7)
SODIUM BLD-SCNC: 140 MMOL/L (ref 135–144)
SPECIMEN DESCRIPTION: ABNORMAL
SPECIMEN DESCRIPTION: ABNORMAL
STATUS: ABNORMAL
TCO2 (CALC), ART: 35 MMOL/L (ref 23–28)
WBC # BLD: 9.9 K/UL (ref 3.5–11)
WBC # BLD: ABNORMAL 10*3/UL

## 2018-01-21 PROCEDURE — 6370000000 HC RX 637 (ALT 250 FOR IP): Performed by: INTERNAL MEDICINE

## 2018-01-21 PROCEDURE — 94762 N-INVAS EAR/PLS OXIMTRY CONT: CPT

## 2018-01-21 PROCEDURE — 71045 X-RAY EXAM CHEST 1 VIEW: CPT

## 2018-01-21 PROCEDURE — 97530 THERAPEUTIC ACTIVITIES: CPT

## 2018-01-21 PROCEDURE — 6360000002 HC RX W HCPCS: Performed by: NURSE PRACTITIONER

## 2018-01-21 PROCEDURE — 94640 AIRWAY INHALATION TREATMENT: CPT

## 2018-01-21 PROCEDURE — 36600 WITHDRAWAL OF ARTERIAL BLOOD: CPT

## 2018-01-21 PROCEDURE — 6360000002 HC RX W HCPCS: Performed by: INTERNAL MEDICINE

## 2018-01-21 PROCEDURE — 70450 CT HEAD/BRAIN W/O DYE: CPT

## 2018-01-21 PROCEDURE — 2580000003 HC RX 258: Performed by: INTERNAL MEDICINE

## 2018-01-21 PROCEDURE — 85025 COMPLETE CBC W/AUTO DIFF WBC: CPT

## 2018-01-21 PROCEDURE — 99232 SBSQ HOSP IP/OBS MODERATE 35: CPT | Performed by: INTERNAL MEDICINE

## 2018-01-21 PROCEDURE — 97161 PT EVAL LOW COMPLEX 20 MIN: CPT

## 2018-01-21 PROCEDURE — 6370000000 HC RX 637 (ALT 250 FOR IP): Performed by: NURSE PRACTITIONER

## 2018-01-21 PROCEDURE — 36415 COLL VENOUS BLD VENIPUNCTURE: CPT

## 2018-01-21 PROCEDURE — 2060000000 HC ICU INTERMEDIATE R&B

## 2018-01-21 PROCEDURE — 2000000000 HC ICU R&B

## 2018-01-21 PROCEDURE — 2700000000 HC OXYGEN THERAPY PER DAY

## 2018-01-21 PROCEDURE — 80048 BASIC METABOLIC PNL TOTAL CA: CPT

## 2018-01-21 PROCEDURE — G8978 MOBILITY CURRENT STATUS: HCPCS

## 2018-01-21 PROCEDURE — G8979 MOBILITY GOAL STATUS: HCPCS

## 2018-01-21 PROCEDURE — 6370000000 HC RX 637 (ALT 250 FOR IP): Performed by: FAMILY MEDICINE

## 2018-01-21 PROCEDURE — 82947 ASSAY GLUCOSE BLOOD QUANT: CPT

## 2018-01-21 PROCEDURE — 82803 BLOOD GASES ANY COMBINATION: CPT

## 2018-01-21 PROCEDURE — 83880 ASSAY OF NATRIURETIC PEPTIDE: CPT

## 2018-01-21 PROCEDURE — 94760 N-INVAS EAR/PLS OXIMETRY 1: CPT

## 2018-01-21 PROCEDURE — 94660 CPAP INITIATION&MGMT: CPT

## 2018-01-21 PROCEDURE — 2500000003 HC RX 250 WO HCPCS: Performed by: INTERNAL MEDICINE

## 2018-01-21 RX ORDER — FORMOTEROL FUMARATE 20 UG/2ML
20 SOLUTION RESPIRATORY (INHALATION) EVERY 12 HOURS
Status: COMPLETED | OUTPATIENT
Start: 2018-01-21 | End: 2018-01-26

## 2018-01-21 RX ORDER — BUDESONIDE 0.5 MG/2ML
0.5 INHALANT ORAL 2 TIMES DAILY
Status: COMPLETED | OUTPATIENT
Start: 2018-01-21 | End: 2018-01-26

## 2018-01-21 RX ORDER — ALPRAZOLAM 0.25 MG/1
0.25 TABLET ORAL 2 TIMES DAILY PRN
Status: DISCONTINUED | OUTPATIENT
Start: 2018-01-21 | End: 2018-01-26

## 2018-01-21 RX ORDER — FUROSEMIDE 10 MG/ML
20 INJECTION INTRAMUSCULAR; INTRAVENOUS ONCE
Status: COMPLETED | OUTPATIENT
Start: 2018-01-21 | End: 2018-01-21

## 2018-01-21 RX ORDER — IPRATROPIUM BROMIDE AND ALBUTEROL SULFATE 2.5; .5 MG/3ML; MG/3ML
1 SOLUTION RESPIRATORY (INHALATION)
Status: DISCONTINUED | OUTPATIENT
Start: 2018-01-21 | End: 2018-01-22

## 2018-01-21 RX ADMIN — IPRATROPIUM BROMIDE AND ALBUTEROL SULFATE 1 AMPULE: .5; 3 SOLUTION RESPIRATORY (INHALATION) at 15:05

## 2018-01-21 RX ADMIN — METHYLPREDNISOLONE SODIUM SUCCINATE 40 MG: 40 INJECTION, POWDER, FOR SOLUTION INTRAMUSCULAR; INTRAVENOUS at 23:34

## 2018-01-21 RX ADMIN — TAZOBACTAM SODIUM AND PIPERACILLIN SODIUM 3.38 G: 375; 3 INJECTION, SOLUTION INTRAVENOUS at 19:40

## 2018-01-21 RX ADMIN — Medication 10 ML: at 21:40

## 2018-01-21 RX ADMIN — Medication 2 MG: at 21:32

## 2018-01-21 RX ADMIN — ALBUTEROL SULFATE 2.5 MG: 2.5 SOLUTION RESPIRATORY (INHALATION) at 11:48

## 2018-01-21 RX ADMIN — TAZOBACTAM SODIUM AND PIPERACILLIN SODIUM 3.38 G: 375; 3 INJECTION, SOLUTION INTRAVENOUS at 03:44

## 2018-01-21 RX ADMIN — IPRATROPIUM BROMIDE AND ALBUTEROL SULFATE 1 AMPULE: .5; 3 SOLUTION RESPIRATORY (INHALATION) at 19:34

## 2018-01-21 RX ADMIN — MOMETASONE FUROATE AND FORMOTEROL FUMARATE DIHYDRATE 2 PUFF: 200; 5 AEROSOL RESPIRATORY (INHALATION) at 07:12

## 2018-01-21 RX ADMIN — PANTOPRAZOLE SODIUM 40 MG: 40 TABLET, DELAYED RELEASE ORAL at 08:45

## 2018-01-21 RX ADMIN — INSULIN LISPRO 1 UNITS: 100 INJECTION, SOLUTION INTRAVENOUS; SUBCUTANEOUS at 08:54

## 2018-01-21 RX ADMIN — METHYLPREDNISOLONE SODIUM SUCCINATE 40 MG: 40 INJECTION, POWDER, FOR SOLUTION INTRAMUSCULAR; INTRAVENOUS at 08:44

## 2018-01-21 RX ADMIN — BUDESONIDE 500 MCG: 0.5 SUSPENSION RESPIRATORY (INHALATION) at 19:34

## 2018-01-21 RX ADMIN — ENOXAPARIN SODIUM 30 MG: 30 INJECTION SUBCUTANEOUS at 10:21

## 2018-01-21 RX ADMIN — TAZOBACTAM SODIUM AND PIPERACILLIN SODIUM 3.38 G: 375; 3 INJECTION, SOLUTION INTRAVENOUS at 12:39

## 2018-01-21 RX ADMIN — Medication 2 MG: at 05:59

## 2018-01-21 RX ADMIN — TIOTROPIUM BROMIDE 18 MCG: 18 CAPSULE ORAL; RESPIRATORY (INHALATION) at 07:12

## 2018-01-21 RX ADMIN — ALBUTEROL SULFATE 2.5 MG: 2.5 SOLUTION RESPIRATORY (INHALATION) at 03:52

## 2018-01-21 RX ADMIN — FORMOTEROL FUMARATE DIHYDRATE 20 MCG: 20 SOLUTION RESPIRATORY (INHALATION) at 19:36

## 2018-01-21 RX ADMIN — Medication 10 ML: at 08:52

## 2018-01-21 RX ADMIN — ALPRAZOLAM 0.25 MG: 0.25 TABLET ORAL at 22:56

## 2018-01-21 RX ADMIN — ALBUTEROL SULFATE 2.5 MG: 2.5 SOLUTION RESPIRATORY (INHALATION) at 07:12

## 2018-01-21 RX ADMIN — ALBUTEROL SULFATE 2.5 MG: 2.5 SOLUTION RESPIRATORY (INHALATION) at 00:16

## 2018-01-21 RX ADMIN — FLUOXETINE 20 MG: 20 CAPSULE ORAL at 08:45

## 2018-01-21 RX ADMIN — FUROSEMIDE 20 MG: 10 INJECTION, SOLUTION INTRAMUSCULAR; INTRAVENOUS at 22:56

## 2018-01-21 ASSESSMENT — PAIN DESCRIPTION - PAIN TYPE: TYPE: ACUTE PAIN

## 2018-01-21 ASSESSMENT — PAIN SCALES - GENERAL
PAINLEVEL_OUTOF10: 9
PAINLEVEL_OUTOF10: 9

## 2018-01-21 ASSESSMENT — PAIN DESCRIPTION - ORIENTATION: ORIENTATION: LOWER

## 2018-01-21 ASSESSMENT — PAIN DESCRIPTION - DESCRIPTORS: DESCRIPTORS: DISCOMFORT;ACHING

## 2018-01-21 ASSESSMENT — PAIN DESCRIPTION - LOCATION: LOCATION: BACK

## 2018-01-21 NOTE — PROGRESS NOTES
left-sided thoracentesis done and they were gram-positive cocci isolated in the fluid. Vancomycin was started and I was asked to evaluate and help with antibiotic choice. Current evaluation:2018    BP (!) 121/59   Pulse 93   Temp 97.7 °F (36.5 °C) (Axillary)   Resp 20   Ht 6' 1\" (1.854 m)   Wt 228 lb (103.4 kg)   SpO2 96%   BMI 30.08 kg/m²     Temperature Range: Temp: 97.7 °F (36.5 °C) Temp  Av.8 °F (36.6 °C)  Min: 97.5 °F (36.4 °C)  Max: 98.1 °F (36.7 °C)  The patient is seen and evaluated at bedside he is awake and alert in no acute distress. He still feels constipated though he has been having loose stools he feels that he still has some hard stool around it. He has not had any fevers or chills. He remains on O2 by nasal cannula but overall his breathing is improved. Physical Examination :     Physical Exam   Constitutional: He is oriented to person, place, and time. HENT:   Head: Normocephalic and atraumatic. Eyes: Pupils are equal, round, and reactive to light. Cardiovascular: Regular rhythm and normal heart sounds. Pulmonary/Chest: Effort normal and breath sounds normal.   Diminished breath sounds at the bases   Abdominal: Soft. Bowel sounds are normal. He exhibits distension. He exhibits no mass. There is no tenderness. Musculoskeletal: Normal range of motion. Neurological: He is alert and oriented to person, place, and time. Skin: Skin is warm and dry.        Laboratory data:   I have independently reviewed the following labs:  CBC with Differential:   Recent Labs      18   0747  18   0707   WBC  11.7*  9.9   HGB  10.3*  10.4*   HCT  30.9*  31.2*   PLT  93*  83*   LYMPHOPCT  20*  21*   MONOPCT  7  5     BMP:   Recent Labs      18   2133  18   0707   NA  143  140   K  3.8  4.3   CL  101  98   CO2  31  30   BUN  17  21   CREATININE  0.69*  0.67*     Hepatic Function Panel:   Recent Labs      18   2133   PROT  6.4   LABALBU  3.3*   BILITOT Organism SHOM     Acid Fast Culture With Smear [[id number]] Collected: 01/18/18 1943   Order Status: Completed Updated: 01/19/18 1056    Specimen Description . PLEURAL FLUID    Special Requests NOT REPORTED    Direct Exam NO ACID FAST BACILLI SEEN (CONCENTRATED SMEAR)    Direct Exam Performed at [address] [place] [address] [facility], [address] [place] [address] [phone]    Culture Pending    Status Pending   Fungal stain [[id number]] Collected: 01/18/18 1530   Order Status: Completed Specimen: Pleural Fluid Updated: 01/19/18 1054    Specimen Description . PLEURAL FLUID Performed at [address] [place] [address] [facility]    Specimen Description [place], [address] [facility] [phone]    Special Requests NOT REPORTED    Direct Exam NO FUNGAL ELEMENTS SEEN    Direct Exam Performed at [address] [place] [address] [facility], [address] [place] [address] [phone]    Status FINAL 01/19/2018       Medications:      piperacillin-tazobactam  3.375 g Intravenous Q8H    enoxaparin  30 mg Subcutaneous BID    methylPREDNISolone  40 mg Intravenous Q12H    pantoprazole  40 mg Oral QAM AC    nicotine  1 patch Transdermal Daily    sodium chloride flush  10 mL Intravenous 2 times per day    mometasone-formoterol  2 puff Inhalation Q12H    insulin lispro  0-6 Units Subcutaneous TID WC    insulin lispro  0-3 Units Subcutaneous Nightly    albuterol  2.5 mg Nebulization Q4H    FLUoxetine  20 mg Oral Daily    tiotropium  18 mcg Inhalation Daily     Thank you for allowing us to participate in the care of this patient. Please call with questions.     Niki Sánchez MD  [employer]  OFFICE: [phone]    Electronically signed by Niki Sánchez MD on 1/21/2018 at 9:55 AM    This note is created with the assistance of a speech recognition program.  While intending to generate a document that actually reflects the content of the visit, the document can still have some errors including those of syntax and sound a like substitutions which may escape proof reading. It such instances, actual meaning can be extrapolated by contextual diversion.

## 2018-01-21 NOTE — PROGRESS NOTES
review due to extreme fatigue)    AROM RLE (degrees)  RLE AROM: WNL  AROM LLE (degrees)  LLE AROM : WNL  AROM RUE (degrees)  RUE AROM : WNL  AROM LUE (degrees)  LUE AROM : WNL  Strength RLE  Strength RLE: WFL  Comment: 5/5 B LE's  Strength LLE  Strength LLE: WFL  Strength RUE  Strength RUE: WFL  Comment: 4/5 B UE's  Strength LUE  Strength LUE: WFL  Tone RLE  RLE Tone: Normotonic  Tone LLE  LLE Tone: Normotonic  Motor Control  Gross Motor?: WNL  Sensation  Overall Sensation Status: WNL  Bed mobility  Comment: Pt. unable to tolerate any mobility due to fatigue              Exercises  Comments: Reviewed  LE HEP hand out     Assessment   Body structures, Functions, Activity limitations: Decreased functional mobility ; Decreased strength;Decreased endurance  Prognosis: Good  Decision Making: Low Complexity  REQUIRES PT FOLLOW UP: Yes  Activity Tolerance  Activity Tolerance: Patient limited by fatigue;Patient limited by endurance     Discharge Recommendations:  [address] [place] [address]  Times per week: 1-2x/day,6-7 days/week  Current Treatment Recommendations: Strengthening, Transfer Training, Functional Mobility Training, Gait Training, Endurance Training, Home Exercise Program  Safety Devices  Type of devices: Left in bed, Bed alarm in place, Call light within reach    G-Code  PT G-Codes  Functional Assessment Tool Used: KFO  Score: 1  Functional Limitation: Mobility: Walking and moving around  Mobility: Walking and Moving Around Current Status (): At least 80 percent but less than 100 percent impaired, limited or restricted  Mobility: Walking and Moving Around Goal Status (): 0 percent impaired, limited or restricted  OutComes Score                                           AM-PAC Score             Goals  Short term goals  Time Frame for Short term goals: 12 treatments  Short term goal 1: Independent ambulation 200' x 1 w/ appropriate assistive device  Short term goal 2:  Independent

## 2018-01-21 NOTE — PLAN OF CARE
Problem: Falls - Risk of  Goal: Absence of falls  Outcome: Ongoing  Pt determined by Ghazala Ward fall risk assessment to be a fall risk; falling star, ID band and safety alarm activated and in use as needed. Hourly rounding performed, call light use encouraged; personal items within reach. Bed locked in low position.

## 2018-01-21 NOTE — PROGRESS NOTES
 Depression     Metastatic adenocarcinoma to bone marrow (HCC)       Allergies: No Known Allergies     Medications:   Scheduled Meds:   piperacillin-tazobactam  3.375 g Intravenous Q8H    enoxaparin  30 mg Subcutaneous BID    methylPREDNISolone  40 mg Intravenous Q12H    pantoprazole  40 mg Oral QAM AC    nicotine  1 patch Transdermal Daily    sodium chloride flush  10 mL Intravenous 2 times per day    mometasone-formoterol  2 puff Inhalation Q12H    insulin lispro  0-6 Units Subcutaneous TID WC    insulin lispro  0-3 Units Subcutaneous Nightly    albuterol  2.5 mg Nebulization Q4H    FLUoxetine  20 mg Oral Daily    tiotropium  18 mcg Inhalation Daily     PRN Medications: sodium chloride flush, magnesium hydroxide, ondansetron, morphine, albuterol, glucose, dextrose, glucagon (rDNA), dextrose, acetaminophen  Continuous Infusions:   dextrose         Objective:   Vitals: BP (!) 121/59   Pulse 93   Temp 97.7 °F (36.5 °C) (Axillary)   Resp 20   Ht 6' 1\" (1.854 m)   Wt 228 lb (103.4 kg)   SpO2 96%   BMI 30.08 kg/m²   General appearance - ill appearing, not in pain but has mild respiratory distress  Mental status - good mood, alert and oriented  Eyes - pupils equal and reactive, extraocular eye movements intact  Ears - bilateral TM's and external ear canals normal  Nose - normal and patent, no erythema, discharge or polyps  Mouth - mucous membranes moist, pharynx normal without lesions  Neck - supple, no significant adenopathy  Lymphatics - no palpable lymphadenopathy, no hepatosplenomegaly  Chest -decreased air entry with scattered rhonchi  Heart - normal rate, regular rhythm, normal S1, S2, no murmurs, rubs, clicks or gallops  Abdomen - soft, nontender, nondistended, no masses or organomegaly  Neurological - alert, oriented, normal speech, no focal findings or movement disorder noted  Musculoskeletal - no joint tenderness, deformity or swelling  Extremities - peripheral pulses normal, no pedal edema, no clubbing or cyanosis    Data:  No intake/output data recorded. In: 1240 [P.O.:1240]  Out: 300 [Urine:300]  CBC:   Recent Labs      01/19/18   0747  01/21/18   0707   WBC  11.7*  9.9   HGB  10.3*  10.4*   PLT  93*  83*     BMP:    Recent Labs      01/18/18   2133  01/21/18   0707   NA  143  140   K  3.8  4.3   CL  101  98   CO2  31  30   BUN  17  21   CREATININE  0.69*  0.67*   GLUCOSE  225*  153*     Hepatic:   Recent Labs      01/18/18   2133   AST  30   ALT  32   BILITOT  0.37   ALKPHOS  971*     INR:   Recent Labs      01/18/18   1205   INR  1.1     IMAGING DATA:  CT chest 1/17/18  Impression   1. No evidence for acute pulmonary embolism.  Some artifacts decrease   sensitivity the segmental and subsegmental branches. 2. Worsening left pleural, stable right pleural effusion. 3. Worsening discrete and confluent geographic appearing irregular   ground-glass attenuation particularly in the upper lobes right greater than   left along with a new band in the right lower lobe which may represent acute   pneumonitis although remains nonspecific. 4. Stable diffuse interlobular septal thickening.  Possibilities include   pulmonary edema versus lymphangitic carcinomatosis. 5. Diffuse osseous metastasis again demonstrated     Assessment    1. Diffuse stage IV metastatic adenocarcinoma on bone marrow biopsy with probable lung primary. 2. Diffuse bone metastasis  3. Chronic respiratory failure  4. COPD  5. Interstitial lung disease  6. Possible pneumonia versus Lymphangitic Spread on CT Scan  7. Thrombocytopenia    Plan     1. Continue supportive management  2. Once infection is controlled, patient will need to be treated as o/p likely as adenocarcinoma of unknown primary/lung. 3. He will follow with Dr Reji Diaz to discuss this as o/p  4. Patient's questions were answered to the best of his satisfaction and he verbalized full understanding and agreement.   5. Thank you for allowing us to participate in the care of this pleasant patient.         Jozef Sher MD  Hematologist/Medical Oncologist    Cell: [phone]      This note is created with the assistance of a speech recognition program.  While intending to generate a document that actually reflects the content of the visit, the document can still have some errors including those of syntax and sound a like substitutions which may escape proof reading. It such instances, actual meaning can be extrapolated by contextual diversion.

## 2018-01-22 ENCOUNTER — APPOINTMENT (OUTPATIENT)
Dept: GENERAL RADIOLOGY | Age: 63
DRG: 193 | End: 2018-01-22
Payer: COMMERCIAL

## 2018-01-22 LAB
ABSOLUTE EOS #: 0.1 K/UL (ref 0–0.4)
ABSOLUTE IMMATURE GRANULOCYTE: ABNORMAL K/UL (ref 0–0.3)
ABSOLUTE LYMPH #: 1.6 K/UL (ref 1–4.8)
ABSOLUTE MONO #: 0.7 K/UL (ref 0.2–0.8)
ALBUMIN SERPL-MCNC: 3.2 G/DL (ref 3.5–5.2)
ALBUMIN/GLOBULIN RATIO: ABNORMAL (ref 1–2.5)
ALP BLD-CCNC: 883 U/L (ref 40–129)
ALT SERPL-CCNC: 57 U/L (ref 5–41)
ANION GAP SERPL CALCULATED.3IONS-SCNC: 9 MMOL/L (ref 9–17)
AST SERPL-CCNC: 39 U/L
BASOPHILS # BLD: 0 % (ref 0–2)
BASOPHILS ABSOLUTE: 0 K/UL (ref 0–0.2)
BILIRUB SERPL-MCNC: 0.71 MG/DL (ref 0.3–1.2)
BILIRUBIN DIRECT: 0.21 MG/DL
BILIRUBIN, INDIRECT: 0.5 MG/DL (ref 0–1)
BUN BLDV-MCNC: 21 MG/DL (ref 8–23)
CALCIUM SERPL-MCNC: 8.1 MG/DL (ref 8.6–10.4)
CHLORIDE BLD-SCNC: 96 MMOL/L (ref 98–107)
CO2: 33 MMOL/L (ref 20–31)
CREAT SERPL-MCNC: 0.66 MG/DL (ref 0.7–1.2)
DIFFERENTIAL TYPE: ABNORMAL
EOSINOPHILS RELATIVE PERCENT: 1 % (ref 1–4)
GFR AFRICAN AMERICAN: >60 ML/MIN
GFR NON-AFRICAN AMERICAN: >60 ML/MIN
GFR SERPL CREATININE-BSD FRML MDRD: ABNORMAL ML/MIN/{1.73_M2}
GFR SERPL CREATININE-BSD FRML MDRD: ABNORMAL ML/MIN/{1.73_M2}
GLUCOSE BLD-MCNC: 138 MG/DL (ref 75–110)
GLUCOSE BLD-MCNC: 149 MG/DL (ref 75–110)
GLUCOSE BLD-MCNC: 153 MG/DL (ref 70–99)
GLUCOSE BLD-MCNC: 195 MG/DL (ref 75–110)
HCT VFR BLD CALC: 28.9 % (ref 41–53)
HEMOGLOBIN: 9.9 G/DL (ref 13.5–17.5)
IMMATURE GRANULOCYTES: ABNORMAL %
LYMPHOCYTES # BLD: 18 % (ref 24–44)
MCH RBC QN AUTO: 29.2 PG (ref 26–34)
MCHC RBC AUTO-ENTMCNC: 34.3 G/DL (ref 31–37)
MCV RBC AUTO: 85.2 FL (ref 80–100)
MONOCYTES # BLD: 8 % (ref 1–7)
NRBC AUTOMATED: ABNORMAL PER 100 WBC
PDW BLD-RTO: 15.4 % (ref 11.5–14.5)
PLATELET # BLD: 67 K/UL (ref 130–400)
PLATELET ESTIMATE: ABNORMAL
PMV BLD AUTO: 7.5 FL (ref 6–12)
POTASSIUM SERPL-SCNC: 4.6 MMOL/L (ref 3.7–5.3)
RBC # BLD: 3.4 M/UL (ref 4.5–5.9)
RBC # BLD: ABNORMAL 10*6/UL
SEG NEUTROPHILS: 73 % (ref 36–66)
SEGMENTED NEUTROPHILS ABSOLUTE COUNT: 6.5 K/UL (ref 1.8–7.7)
SODIUM BLD-SCNC: 138 MMOL/L (ref 135–144)
TOTAL PROTEIN: 5.9 G/DL (ref 6.4–8.3)
WBC # BLD: 8.9 K/UL (ref 3.5–11)
WBC # BLD: ABNORMAL 10*3/UL

## 2018-01-22 PROCEDURE — 99232 SBSQ HOSP IP/OBS MODERATE 35: CPT | Performed by: INTERNAL MEDICINE

## 2018-01-22 PROCEDURE — G8987 SELF CARE CURRENT STATUS: HCPCS

## 2018-01-22 PROCEDURE — 2000000000 HC ICU R&B

## 2018-01-22 PROCEDURE — 6370000000 HC RX 637 (ALT 250 FOR IP): Performed by: NURSE PRACTITIONER

## 2018-01-22 PROCEDURE — 80053 COMPREHEN METABOLIC PANEL: CPT

## 2018-01-22 PROCEDURE — 6370000000 HC RX 637 (ALT 250 FOR IP): Performed by: INTERNAL MEDICINE

## 2018-01-22 PROCEDURE — 71045 X-RAY EXAM CHEST 1 VIEW: CPT

## 2018-01-22 PROCEDURE — 85025 COMPLETE CBC W/AUTO DIFF WBC: CPT

## 2018-01-22 PROCEDURE — G8988 SELF CARE GOAL STATUS: HCPCS

## 2018-01-22 PROCEDURE — 94660 CPAP INITIATION&MGMT: CPT

## 2018-01-22 PROCEDURE — 6360000002 HC RX W HCPCS: Performed by: NURSE PRACTITIONER

## 2018-01-22 PROCEDURE — 94761 N-INVAS EAR/PLS OXIMETRY MLT: CPT

## 2018-01-22 PROCEDURE — 97535 SELF CARE MNGMENT TRAINING: CPT

## 2018-01-22 PROCEDURE — 2580000003 HC RX 258: Performed by: INTERNAL MEDICINE

## 2018-01-22 PROCEDURE — 82947 ASSAY GLUCOSE BLOOD QUANT: CPT

## 2018-01-22 PROCEDURE — 97164 PT RE-EVAL EST PLAN CARE: CPT

## 2018-01-22 PROCEDURE — G8979 MOBILITY GOAL STATUS: HCPCS

## 2018-01-22 PROCEDURE — 6360000002 HC RX W HCPCS: Performed by: INTERNAL MEDICINE

## 2018-01-22 PROCEDURE — 97166 OT EVAL MOD COMPLEX 45 MIN: CPT

## 2018-01-22 PROCEDURE — G8978 MOBILITY CURRENT STATUS: HCPCS

## 2018-01-22 PROCEDURE — 36415 COLL VENOUS BLD VENIPUNCTURE: CPT

## 2018-01-22 PROCEDURE — 82248 BILIRUBIN DIRECT: CPT

## 2018-01-22 PROCEDURE — 2500000003 HC RX 250 WO HCPCS: Performed by: INTERNAL MEDICINE

## 2018-01-22 PROCEDURE — 97530 THERAPEUTIC ACTIVITIES: CPT

## 2018-01-22 PROCEDURE — 6370000000 HC RX 637 (ALT 250 FOR IP): Performed by: FAMILY MEDICINE

## 2018-01-22 PROCEDURE — 94640 AIRWAY INHALATION TREATMENT: CPT

## 2018-01-22 RX ORDER — METHYLPREDNISOLONE SODIUM SUCCINATE 40 MG/ML
40 INJECTION, POWDER, LYOPHILIZED, FOR SOLUTION INTRAMUSCULAR; INTRAVENOUS EVERY 8 HOURS
Status: DISCONTINUED | OUTPATIENT
Start: 2018-01-22 | End: 2018-01-23

## 2018-01-22 RX ORDER — IPRATROPIUM BROMIDE AND ALBUTEROL SULFATE 2.5; .5 MG/3ML; MG/3ML
1 SOLUTION RESPIRATORY (INHALATION)
Status: DISCONTINUED | OUTPATIENT
Start: 2018-01-23 | End: 2018-01-28 | Stop reason: HOSPADM

## 2018-01-22 RX ORDER — GUAIFENESIN 600 MG/1
600 TABLET, EXTENDED RELEASE ORAL 2 TIMES DAILY
Status: DISCONTINUED | OUTPATIENT
Start: 2018-01-22 | End: 2018-01-28

## 2018-01-22 RX ORDER — FUROSEMIDE 10 MG/ML
40 INJECTION INTRAMUSCULAR; INTRAVENOUS ONCE
Status: COMPLETED | OUTPATIENT
Start: 2018-01-22 | End: 2018-01-22

## 2018-01-22 RX ADMIN — METHYLPREDNISOLONE SODIUM SUCCINATE 40 MG: 40 INJECTION, POWDER, FOR SOLUTION INTRAMUSCULAR; INTRAVENOUS at 08:18

## 2018-01-22 RX ADMIN — FORMOTEROL FUMARATE DIHYDRATE 20 MCG: 20 SOLUTION RESPIRATORY (INHALATION) at 07:40

## 2018-01-22 RX ADMIN — IPRATROPIUM BROMIDE AND ALBUTEROL SULFATE 1 AMPULE: .5; 3 SOLUTION RESPIRATORY (INHALATION) at 07:37

## 2018-01-22 RX ADMIN — Medication 2 MG: at 03:59

## 2018-01-22 RX ADMIN — METHYLPREDNISOLONE SODIUM SUCCINATE 40 MG: 40 INJECTION, POWDER, FOR SOLUTION INTRAMUSCULAR; INTRAVENOUS at 16:36

## 2018-01-22 RX ADMIN — TAZOBACTAM SODIUM AND PIPERACILLIN SODIUM 3.38 G: 375; 3 INJECTION, SOLUTION INTRAVENOUS at 12:09

## 2018-01-22 RX ADMIN — PANTOPRAZOLE SODIUM 40 MG: 40 TABLET, DELAYED RELEASE ORAL at 08:17

## 2018-01-22 RX ADMIN — TAZOBACTAM SODIUM AND PIPERACILLIN SODIUM 3.38 G: 375; 3 INJECTION, SOLUTION INTRAVENOUS at 03:59

## 2018-01-22 RX ADMIN — Medication 2 MG: at 02:21

## 2018-01-22 RX ADMIN — METHYLPREDNISOLONE SODIUM SUCCINATE 40 MG: 40 INJECTION, POWDER, FOR SOLUTION INTRAMUSCULAR; INTRAVENOUS at 23:50

## 2018-01-22 RX ADMIN — Medication 2 MG: at 20:38

## 2018-01-22 RX ADMIN — IPRATROPIUM BROMIDE AND ALBUTEROL SULFATE 1 AMPULE: .5; 3 SOLUTION RESPIRATORY (INHALATION) at 15:49

## 2018-01-22 RX ADMIN — Medication 2 MG: at 00:56

## 2018-01-22 RX ADMIN — Medication 2 MG: at 23:38

## 2018-01-22 RX ADMIN — Medication 2 MG: at 22:30

## 2018-01-22 RX ADMIN — Medication 2 MG: at 16:30

## 2018-01-22 RX ADMIN — FLUOXETINE 20 MG: 20 CAPSULE ORAL at 08:18

## 2018-01-22 RX ADMIN — Medication 2 MG: at 10:40

## 2018-01-22 RX ADMIN — IPRATROPIUM BROMIDE AND ALBUTEROL SULFATE 1 AMPULE: .5; 3 SOLUTION RESPIRATORY (INHALATION) at 11:15

## 2018-01-22 RX ADMIN — FORMOTEROL FUMARATE DIHYDRATE 20 MCG: 20 SOLUTION RESPIRATORY (INHALATION) at 20:11

## 2018-01-22 RX ADMIN — Medication 2 MG: at 05:48

## 2018-01-22 RX ADMIN — FUROSEMIDE 40 MG: 10 INJECTION, SOLUTION INTRAMUSCULAR; INTRAVENOUS at 13:47

## 2018-01-22 RX ADMIN — Medication 10 ML: at 19:55

## 2018-01-22 RX ADMIN — Medication 2 MG: at 12:11

## 2018-01-22 RX ADMIN — Medication 2 MG: at 19:29

## 2018-01-22 RX ADMIN — IPRATROPIUM BROMIDE AND ALBUTEROL SULFATE 1 AMPULE: .5; 3 SOLUTION RESPIRATORY (INHALATION) at 20:11

## 2018-01-22 RX ADMIN — GUAIFENESIN 600 MG: 600 TABLET, EXTENDED RELEASE ORAL at 19:55

## 2018-01-22 RX ADMIN — BUDESONIDE 500 MCG: 0.5 SUSPENSION RESPIRATORY (INHALATION) at 20:11

## 2018-01-22 RX ADMIN — TAZOBACTAM SODIUM AND PIPERACILLIN SODIUM 3.38 G: 375; 3 INJECTION, SOLUTION INTRAVENOUS at 19:55

## 2018-01-22 RX ADMIN — Medication 2 MG: at 13:47

## 2018-01-22 RX ADMIN — Medication 2 MG: at 14:57

## 2018-01-22 RX ADMIN — INSULIN LISPRO 1 UNITS: 100 INJECTION, SOLUTION INTRAVENOUS; SUBCUTANEOUS at 08:28

## 2018-01-22 RX ADMIN — INSULIN LISPRO 1 UNITS: 100 INJECTION, SOLUTION INTRAVENOUS; SUBCUTANEOUS at 20:52

## 2018-01-22 RX ADMIN — GUAIFENESIN 600 MG: 600 TABLET, EXTENDED RELEASE ORAL at 14:57

## 2018-01-22 RX ADMIN — Medication 2 MG: at 08:18

## 2018-01-22 RX ADMIN — BUDESONIDE 500 MCG: 0.5 SUSPENSION RESPIRATORY (INHALATION) at 07:37

## 2018-01-22 ASSESSMENT — PAIN DESCRIPTION - PAIN TYPE
TYPE: ACUTE PAIN

## 2018-01-22 ASSESSMENT — PAIN DESCRIPTION - ONSET
ONSET: GRADUAL

## 2018-01-22 ASSESSMENT — PAIN DESCRIPTION - DESCRIPTORS
DESCRIPTORS: ACHING;SHARP
DESCRIPTORS: ACHING;SHARP
DESCRIPTORS: SHARP
DESCRIPTORS: ACHING;SHARP

## 2018-01-22 ASSESSMENT — PAIN DESCRIPTION - LOCATION
LOCATION: HIP;BACK
LOCATION: HIP

## 2018-01-22 ASSESSMENT — PAIN SCALES - GENERAL
PAINLEVEL_OUTOF10: 9
PAINLEVEL_OUTOF10: 6
PAINLEVEL_OUTOF10: 5
PAINLEVEL_OUTOF10: 6
PAINLEVEL_OUTOF10: 8
PAINLEVEL_OUTOF10: 9
PAINLEVEL_OUTOF10: 9
PAINLEVEL_OUTOF10: 8
PAINLEVEL_OUTOF10: 9
PAINLEVEL_OUTOF10: 2
PAINLEVEL_OUTOF10: 6
PAINLEVEL_OUTOF10: 6
PAINLEVEL_OUTOF10: 4
PAINLEVEL_OUTOF10: 8
PAINLEVEL_OUTOF10: 6
PAINLEVEL_OUTOF10: 9
PAINLEVEL_OUTOF10: 5
PAINLEVEL_OUTOF10: 3

## 2018-01-22 ASSESSMENT — PAIN DESCRIPTION - FREQUENCY
FREQUENCY: CONTINUOUS
FREQUENCY: INTERMITTENT
FREQUENCY: CONTINUOUS

## 2018-01-22 ASSESSMENT — PAIN DESCRIPTION - PROGRESSION
CLINICAL_PROGRESSION: NOT CHANGED
CLINICAL_PROGRESSION: NOT CHANGED

## 2018-01-22 NOTE — PLAN OF CARE
Problem: Pain:  Goal: Pain level will decrease  Pain level will decrease   Outcome: Ongoing  Repositioning with pillows for comfort; Medication see MAR  Goal: Control of acute pain  Control of acute pain   Outcome: Ongoing    Goal: Control of chronic pain  Control of chronic pain   Outcome: Ongoing      Problem: Falls - Risk of  Goal: Absence of falls  Outcome: Ongoing  Uses call light appropriately, fall precautions in place will continue to monitor.       Problem: Musculor/Skeletal Functional Status  Goal: Highest potential functional level  Outcome: Ongoing

## 2018-01-22 NOTE — PROGRESS NOTES
Good  Sitting - Dynamic: Good  Standing - Static: Fair (with RW)  Exercises  Comments: Discussed how to move any joint repeatedly to make an exercise (demonstrated this);  told to listen to body and not over do it, however to make sure he is moving extremities against gravity to tolerance to prevent weakness; Also up to chair to build neck/back strength and endurance. Assessment   Body structures, Functions, Activity limitations: Decreased functional mobility ; Decreased strength;Decreased endurance  Assessment: Pt. agreeable to SNF, as recommended by PT/OT to build strength/endurnace and balance;  family in room also in agreement. Prognosis: Good  Decision Making: Medium Complexity  REQUIRES PT FOLLOW UP: Yes  Activity Tolerance  Activity Tolerance: Patient limited by fatigue;Patient limited by endurance     Discharge Recommendations:  [address]  Times per week: 1-2x/day,6-7 days/week  Current Treatment Recommendations: Strengthening, Transfer Training, Functional Mobility Training, Gait Training, Endurance Training, Home Exercise Program, Safety Education & Training, Balance Training, ROM  Safety Devices  Type of devices: Left in bed, Call light within reach, All fall risk precautions in place, Gait belt, Patient at risk for falls, Nurse notified, Left in chair    G-Code  PT G-Codes  Functional Assessment Tool Used: [place] FOM  Score: 14  Functional Limitation: Mobility: Walking and moving around  Mobility: Walking and Moving Around Current Status (): At least 40 percent but less than 60 percent impaired, limited or restricted  Mobility: Walking and Moving Around Goal Status (): 0 percent impaired, limited or restricted  Goals  Short term goals  Time Frame for Short term goals: 12 treatments  Short term goal 1: Independent ambulation 200' x 1 w/ appropriate assistive device  Short term goal 2:  Independent bed mobility/transfers  Short term goal 3: 1/2 to 1 grade strength increase B UE's  Short term goal 4: Tolerate 30 min ther act  Patient Goals   Patient goals : Feel better       Therapy Time   Individual Concurrent Group Co-treatment   Time In 5954 (additional 10 min.  for chart review and discussion with RN and S.W.)         Time Out 4424 Althea Berg, PT

## 2018-01-22 NOTE — PROGRESS NOTES
Occupational Therapy   Occupational Therapy Initial Assessment  Date: 2018   Patient Name: Zoya Kirkland  MRN: [id number]     : 1955  Discharge Recommendation:   [address]     RN reports patient is medically stable for therapy treatment this date. Chart reviewed prior to treatment and patient is agreeable for therapy. All lines intact and patient positioned comfortably at end of treatment. All patient needs addressed prior to ending therapy session. Patient Diagnosis(es): The primary encounter diagnosis was Pneumonia of right upper lobe due to infectious organism [facility]). A diagnosis of Pleural effusion was also pertinent to this visit. has a past medical history of Chronic fatigue; COPD (chronic obstructive pulmonary disease) ([facility] Depression; and [facility]ast[facility] has a past surgical history that includes Appendectomy.            Restrictions  Restrictions/Precautions  Restrictions/Precautions: General Precautions, Fall Risk  Required Braces or Orthoses?: No    Subjective   General  Patient assessed for rehabilitation services?: Yes  Pain Assessment  Patient Currently in Pain: Denies  Pain Assessment: 0-10  Pain Level: 9  Pain Type: Acute pain  Pain Location: Hip, Back (bilateral hips, lower back)  Pain Orientation: Lower  Pain Descriptors: Aching, Sharp  Pain Frequency: Continuous  Clinical Progression: Not changed  Patient's Stated Pain Goal: No pain  Pain Intervention(s): Medication (see eMar)  Response to Pain Intervention: Asleep with RR greater than 10  Multiple Pain Sites: No  Oxygen Therapy  SpO2: 95 %  O2 Device: Nasal cannula  FiO2 : 40 %  O2 Flow Rate (L/min): 2 L/min  Social/Functional History  Social/Functional History  Lives With:  (daughter staying temporarily to help pt, unsure how long she can stay)  Type of Home: House  Home Layout: One level  Home Access: Stairs to enter without rails  Entrance Stairs - Number of

## 2018-01-22 NOTE — PROGRESS NOTES
adenocarcinoma. The PET CT scan confirmed bony metastasis but no primary cancer was identified.     The patient had been seen by the pulmonary service and the day prior to his admission and the has been getting progressive shortness of breath as well as back pain. The patient also has had a cough. He was sent into the emergency room for further evaluation. The patient did have a left-sided thoracentesis done and they were gram-positive cocci isolated in the fluid. Vancomycin was started and I was asked to evaluate and help with antibiotic choice. Current evaluation:2018    /83   Pulse 85   Temp 97.6 °F (36.4 °C) (Infrared)   Resp 19   Ht 6' 1\" (1.854 m)   Wt 235 lb 0.2 oz (106.6 kg)   SpO2 98%   BMI 31.01 kg/m²     Temperature Range: Temp: 97.6 °F (36.4 °C) Temp  Av.7 °F (36.5 °C)  Min: 97.5 °F (36.4 °C)  Max: 98.3 °F (36.8 °C)  The patient is seen and evaluated at bedside he is sitting up in the chair eating breakfast.  His wife is with him in the room as is his daughter and the care was discussed with him. The patient last evening developed a pretty significant respiratory distress/hypoxia and ended up being transferred into the intensive care unit where he has been on BiPAP only coming off to eat. He does not appear more toxic than previous and actually currently is some token to him he is pretty comfortable. He has not had any fevers or chills does still have a mostly nonproductive cough    Physical Examination :     Physical Exam   Constitutional: He is oriented to person, place, and time. HENT:   Head: Normocephalic and atraumatic. Eyes: Pupils are equal, round, and reactive to light. Cardiovascular: Regular rhythm and normal heart sounds. Pulmonary/Chest: Effort normal. He has rales (Few scattered intermittent rales). Diminished breath sounds at the bases   Abdominal: Soft. Bowel sounds are normal. He exhibits distension. He exhibits no mass. There is no tenderness. Musculoskeletal: Normal range of motion. Neurological: He is alert and oriented to person, place, and time. Skin: Skin is warm and dry. Laboratory data:   I have independently reviewed the following labs:  CBC with Differential:   Recent Labs      01/21/18   0707  01/22/18   0611   WBC  9.9  8.9   HGB  10.4*  9.9*   HCT  31.2*  28.9*   PLT  83*  67*   LYMPHOPCT  21*  18*   MONOPCT  5  8*     BMP:   Recent Labs      01/21/18   0707  01/22/18   0611   NA  140  138   K  4.3  4.6   CL  98  96*   CO2  30  33*   BUN  21  21   CREATININE  0.67*  0.66*     Hepatic Function Panel:   Recent Labs      01/22/18   0611   PROT  5.9*   LABALBU  3.2*   BILIDIR  0.21   IBILI  0.50   BILITOT  0.71   ALKPHOS  883*   ALT  57*   AST  39     No results for input(s): [facility] in the last 72 hours. No results found for: CRP  No results found for: SEDRATE    Imaging Studies:   SINGLE VIEW OF THE CHEST  1/22/2018 5:55 am    Impression   Unchanged chronic interstitial changes with possible superimposed left basal   airspace disease. Cultures:     Fungus Culture [[id number]] Collected: 01/18/18 1528   Order Status: Completed Specimen: Pleural Fluid Updated: 01/22/18 1040    Specimen Description . PLEURAL FLUID Performed at [address] [place] [address]    Specimen Description [address] [phone]    Special Requests NOT REPORTED    Culture NO GROWTH 4 DAYS    Culture Performed at [facility] [address], [address] [phone]    Status Pending   Cult,Blood #1 [[id number]] Collected: 01/17/18 1600   Order Status: Completed Specimen: Blood Updated: 01/22/18 0841    Specimen Description . BLOOD 8ML LT AC Performed at [address] [place] [address]    Specimen Description [address] [phone]    Special Requests NOT REPORTED    Culture NO GROWTH 5 DAYS    Culture Performed at [facility] [address], [address] [phone]    Status Pending   Cult,Blood #1 [[id number]] Collected: 01/17/18 1615   Order Status: Completed Specimen: Blood Updated: 01/22/18 0841    Specimen Description . BLOOD 10ML RTA Performed at [address]    Specimen Description [address] [phone]    Special Requests NOT REPORTED    Culture NO GROWTH 5 DAYS    Culture Performed at [facility] [id number] [place] [address], [address] [place] [address] [phone]    Status Pending   Acid Fast Culture With Smear [[id number]] Collected: 01/18/18 1943   Order Status: Completed Updated: 01/22/18 0826    Specimen Description . PLEURAL FLUID    Special Requests NOT REPORTED    Direct Exam NO ACID FAST BACILLI SEEN (CONCENTRATED SMEAR)    Culture NO GROWTH 3 DAYS    Culture Performed at [address], [address] [place] [address] [phone]    Status Pending   Body Fluid Culture [[id number]] (Abnormal)  Collected: 01/18/18 1528   Order Status: Completed Specimen: Pleural Fluid Updated: 01/21/18 0644    Specimen Description . PLEURAL FLUID Performed at [address]    Specimen Description [address] [phone]    Special Requests NOT REPORTED    Direct Exam FEW NEUTROPHILS (A)    Direct Exam NO BACTERIA SEEN    Direct Exam Gram stain made from cytocentrifuged specimen.  Organisms and cells will be    Direct Exam  concentrated.     Culture POSITIVE FLUID CULTURE, RN NOTIFIED CHERYL CURRIE AT 1409 ON 1/19/18 (A)    Culture DIRECT GRAM STAIN FROM BOTTLE: GRAM POSITIVE COCCI IN CLUSTERS (A)    Culture STAPHYLOCOCCUS HOMINIS (A)    Culture Performed at [facility] [id number] [place] [address], [address] [place] [address] [phone]    Status FINAL 01/21/2018    Organism SHOM   STAPHYLOCOCCUS HOMINIS     Antibiotic Interpretation FRED Status   Induced Clind Resist Resistant POSITIVE Final   Synercid  NOT REPORTED Final   cefoxitin screen  NOT REPORTED Final   ciprofloxacin  NOT REPORTED Final   clindamycin Resistant <=0.25

## 2018-01-22 NOTE — PROGRESS NOTES
Pt transferred to ICU [id number] per bed, resp at bedside pt on 02 3L/NC pt awake, alert , talking, Handoff at bedside given to [facility].

## 2018-01-22 NOTE — PROGRESS NOTES
consult  · Increase IV solu-medrol 40 mg every 8 hours  · Lasix 40 IV ×1  · Oncology on consult  · Discussed with family  · DVT prophylaxis with low molecular weight heparin  · Peptic ulcer disease prophylaxis          Tremaine York MD on 1/22/2018 at 12:48 PM  Pulmonary Critical Care and Sleep Medicine,  [facility] AT [place]: [phone]

## 2018-01-23 ENCOUNTER — APPOINTMENT (OUTPATIENT)
Dept: GENERAL RADIOLOGY | Age: 63
DRG: 193 | End: 2018-01-23
Payer: COMMERCIAL

## 2018-01-23 LAB
ABSOLUTE BANDS #: 0.91 K/UL
ABSOLUTE EOS #: 0 K/UL (ref 0–0.4)
ABSOLUTE IMMATURE GRANULOCYTE: ABNORMAL K/UL (ref 0–0.3)
ABSOLUTE LYMPH #: 1.72 K/UL (ref 1–4.8)
ABSOLUTE MONO #: 0.91 K/UL (ref 0.2–0.8)
ANION GAP SERPL CALCULATED.3IONS-SCNC: 10 MMOL/L (ref 9–17)
BANDS: 9 % (ref 1–15)
BASOPHILS # BLD: 0 % (ref 0–2)
BASOPHILS ABSOLUTE: 0 K/UL (ref 0–0.2)
BUN BLDV-MCNC: 24 MG/DL (ref 8–23)
BUN/CREAT BLD: 42 (ref 9–20)
CALCIUM SERPL-MCNC: 8 MG/DL (ref 8.6–10.4)
CHLORIDE BLD-SCNC: 93 MMOL/L (ref 98–107)
CO2: 34 MMOL/L (ref 20–31)
CREAT SERPL-MCNC: 0.57 MG/DL (ref 0.7–1.2)
CULTURE: NORMAL
DIFFERENTIAL TYPE: ABNORMAL
EOSINOPHILS RELATIVE PERCENT: 0 % (ref 1–4)
GFR AFRICAN AMERICAN: >60 ML/MIN
GFR NON-AFRICAN AMERICAN: >60 ML/MIN
GFR SERPL CREATININE-BSD FRML MDRD: ABNORMAL ML/MIN/{1.73_M2}
GFR SERPL CREATININE-BSD FRML MDRD: ABNORMAL ML/MIN/{1.73_M2}
GLUCOSE BLD-MCNC: 106 MG/DL (ref 75–110)
GLUCOSE BLD-MCNC: 167 MG/DL (ref 70–99)
GLUCOSE BLD-MCNC: 193 MG/DL (ref 75–110)
GLUCOSE BLD-MCNC: 204 MG/DL (ref 75–110)
HCT VFR BLD CALC: 28.6 % (ref 41–53)
HEMOGLOBIN: 9.7 G/DL (ref 13.5–17.5)
IMMATURE GRANULOCYTES: ABNORMAL %
LYMPHOCYTES # BLD: 17 % (ref 24–44)
Lab: NORMAL
Lab: NORMAL
MCH RBC QN AUTO: 29 PG (ref 26–34)
MCHC RBC AUTO-ENTMCNC: 33.9 G/DL (ref 31–37)
MCV RBC AUTO: 85.4 FL (ref 80–100)
METAMYELOCYTES ABSOLUTE COUNT: 0.4 K/UL
METAMYELOCYTES: 4 %
MONOCYTES # BLD: 9 % (ref 1–7)
MORPHOLOGY: ABNORMAL
MYELOCYTES ABSOLUTE COUNT: 0.1 K/UL
MYELOCYTES: 1 %
NRBC AUTOMATED: ABNORMAL PER 100 WBC
NUCLEATED RED BLOOD CELLS: 6 PER 100 WBC
PDW BLD-RTO: 15.3 % (ref 11.5–14.5)
PLATELET # BLD: 67 K/UL (ref 130–400)
PLATELET ESTIMATE: ABNORMAL
PMV BLD AUTO: 7.7 FL (ref 6–12)
POTASSIUM SERPL-SCNC: 4.2 MMOL/L (ref 3.7–5.3)
RBC # BLD: 3.35 M/UL (ref 4.5–5.9)
RBC # BLD: ABNORMAL 10*6/UL
SEG NEUTROPHILS: 60 % (ref 36–66)
SEGMENTED NEUTROPHILS ABSOLUTE COUNT: 6.06 K/UL (ref 1.8–7.7)
SODIUM BLD-SCNC: 137 MMOL/L (ref 135–144)
SPECIMEN DESCRIPTION: NORMAL
STATUS: NORMAL
STATUS: NORMAL
WBC # BLD: 10.1 K/UL (ref 3.5–11)
WBC # BLD: ABNORMAL 10*3/UL

## 2018-01-23 PROCEDURE — 6370000000 HC RX 637 (ALT 250 FOR IP): Performed by: INTERNAL MEDICINE

## 2018-01-23 PROCEDURE — 36415 COLL VENOUS BLD VENIPUNCTURE: CPT

## 2018-01-23 PROCEDURE — 2000000000 HC ICU R&B

## 2018-01-23 PROCEDURE — 85025 COMPLETE CBC W/AUTO DIFF WBC: CPT

## 2018-01-23 PROCEDURE — 2580000003 HC RX 258: Performed by: INTERNAL MEDICINE

## 2018-01-23 PROCEDURE — 6360000002 HC RX W HCPCS: Performed by: NURSE PRACTITIONER

## 2018-01-23 PROCEDURE — 97535 SELF CARE MNGMENT TRAINING: CPT | Performed by: NURSE PRACTITIONER

## 2018-01-23 PROCEDURE — 6360000002 HC RX W HCPCS: Performed by: INTERNAL MEDICINE

## 2018-01-23 PROCEDURE — 71045 X-RAY EXAM CHEST 1 VIEW: CPT

## 2018-01-23 PROCEDURE — 2700000000 HC OXYGEN THERAPY PER DAY

## 2018-01-23 PROCEDURE — 94660 CPAP INITIATION&MGMT: CPT

## 2018-01-23 PROCEDURE — 94668 MNPJ CHEST WALL SBSQ: CPT

## 2018-01-23 PROCEDURE — 82947 ASSAY GLUCOSE BLOOD QUANT: CPT

## 2018-01-23 PROCEDURE — 80048 BASIC METABOLIC PNL TOTAL CA: CPT

## 2018-01-23 PROCEDURE — 97530 THERAPEUTIC ACTIVITIES: CPT | Performed by: NURSE PRACTITIONER

## 2018-01-23 PROCEDURE — 2500000003 HC RX 250 WO HCPCS: Performed by: INTERNAL MEDICINE

## 2018-01-23 PROCEDURE — 99233 SBSQ HOSP IP/OBS HIGH 50: CPT | Performed by: INTERNAL MEDICINE

## 2018-01-23 PROCEDURE — 94761 N-INVAS EAR/PLS OXIMETRY MLT: CPT

## 2018-01-23 PROCEDURE — 94640 AIRWAY INHALATION TREATMENT: CPT

## 2018-01-23 PROCEDURE — 99232 SBSQ HOSP IP/OBS MODERATE 35: CPT | Performed by: INTERNAL MEDICINE

## 2018-01-23 RX ORDER — MORPHINE SULFATE 15 MG/1
15 TABLET, FILM COATED, EXTENDED RELEASE ORAL EVERY 12 HOURS SCHEDULED
Status: DISCONTINUED | OUTPATIENT
Start: 2018-01-23 | End: 2018-01-28

## 2018-01-23 RX ORDER — MORPHINE SULFATE 2 MG/ML
2 INJECTION, SOLUTION INTRAMUSCULAR; INTRAVENOUS
Status: DISCONTINUED | OUTPATIENT
Start: 2018-01-23 | End: 2018-01-28

## 2018-01-23 RX ORDER — MORPHINE SULFATE 15 MG/1
15 TABLET, FILM COATED, EXTENDED RELEASE ORAL ONCE
Status: COMPLETED | OUTPATIENT
Start: 2018-01-23 | End: 2018-01-23

## 2018-01-23 RX ORDER — METHYLPREDNISOLONE SODIUM SUCCINATE 40 MG/ML
40 INJECTION, POWDER, LYOPHILIZED, FOR SOLUTION INTRAMUSCULAR; INTRAVENOUS EVERY 12 HOURS
Status: DISCONTINUED | OUTPATIENT
Start: 2018-01-24 | End: 2018-01-24

## 2018-01-23 RX ADMIN — TAZOBACTAM SODIUM AND PIPERACILLIN SODIUM 3.38 G: 375; 3 INJECTION, SOLUTION INTRAVENOUS at 20:19

## 2018-01-23 RX ADMIN — METHYLPREDNISOLONE SODIUM SUCCINATE 40 MG: 40 INJECTION, POWDER, FOR SOLUTION INTRAMUSCULAR; INTRAVENOUS at 08:40

## 2018-01-23 RX ADMIN — Medication 10 ML: at 08:50

## 2018-01-23 RX ADMIN — Medication 2 MG: at 10:13

## 2018-01-23 RX ADMIN — PANTOPRAZOLE SODIUM 40 MG: 40 TABLET, DELAYED RELEASE ORAL at 08:41

## 2018-01-23 RX ADMIN — MORPHINE SULFATE 15 MG: 15 TABLET, FILM COATED, EXTENDED RELEASE ORAL at 12:47

## 2018-01-23 RX ADMIN — Medication 2 MG: at 08:13

## 2018-01-23 RX ADMIN — FORMOTEROL FUMARATE DIHYDRATE 20 MCG: 20 SOLUTION RESPIRATORY (INHALATION) at 19:30

## 2018-01-23 RX ADMIN — TAZOBACTAM SODIUM AND PIPERACILLIN SODIUM 3.38 G: 375; 3 INJECTION, SOLUTION INTRAVENOUS at 03:15

## 2018-01-23 RX ADMIN — ENOXAPARIN SODIUM 30 MG: 30 INJECTION SUBCUTANEOUS at 12:46

## 2018-01-23 RX ADMIN — INSULIN LISPRO 1 UNITS: 100 INJECTION, SOLUTION INTRAVENOUS; SUBCUTANEOUS at 22:02

## 2018-01-23 RX ADMIN — BUDESONIDE 500 MCG: 0.5 SUSPENSION RESPIRATORY (INHALATION) at 07:40

## 2018-01-23 RX ADMIN — Medication 10 ML: at 20:19

## 2018-01-23 RX ADMIN — FORMOTEROL FUMARATE DIHYDRATE 20 MCG: 20 SOLUTION RESPIRATORY (INHALATION) at 07:53

## 2018-01-23 RX ADMIN — BUDESONIDE 500 MCG: 0.5 SUSPENSION RESPIRATORY (INHALATION) at 19:12

## 2018-01-23 RX ADMIN — FLUOXETINE 20 MG: 20 CAPSULE ORAL at 08:40

## 2018-01-23 RX ADMIN — IPRATROPIUM BROMIDE AND ALBUTEROL SULFATE 1 AMPULE: .5; 3 SOLUTION RESPIRATORY (INHALATION) at 19:12

## 2018-01-23 RX ADMIN — IPRATROPIUM BROMIDE AND ALBUTEROL SULFATE 1 AMPULE: .5; 3 SOLUTION RESPIRATORY (INHALATION) at 07:40

## 2018-01-23 RX ADMIN — INSULIN LISPRO 2 UNITS: 100 INJECTION, SOLUTION INTRAVENOUS; SUBCUTANEOUS at 12:46

## 2018-01-23 RX ADMIN — TAZOBACTAM SODIUM AND PIPERACILLIN SODIUM 3.38 G: 375; 3 INJECTION, SOLUTION INTRAVENOUS at 12:47

## 2018-01-23 RX ADMIN — IPRATROPIUM BROMIDE AND ALBUTEROL SULFATE 1 AMPULE: .5; 3 SOLUTION RESPIRATORY (INHALATION) at 16:07

## 2018-01-23 RX ADMIN — GUAIFENESIN 600 MG: 600 TABLET, EXTENDED RELEASE ORAL at 20:19

## 2018-01-23 RX ADMIN — Medication 2 MG: at 05:55

## 2018-01-23 RX ADMIN — GUAIFENESIN 600 MG: 600 TABLET, EXTENDED RELEASE ORAL at 08:40

## 2018-01-23 RX ADMIN — ENOXAPARIN SODIUM 30 MG: 30 INJECTION SUBCUTANEOUS at 20:19

## 2018-01-23 RX ADMIN — IPRATROPIUM BROMIDE AND ALBUTEROL SULFATE 1 AMPULE: .5; 3 SOLUTION RESPIRATORY (INHALATION) at 11:51

## 2018-01-23 RX ADMIN — Medication 2 MG: at 04:39

## 2018-01-23 RX ADMIN — METHYLPREDNISOLONE SODIUM SUCCINATE 40 MG: 40 INJECTION, POWDER, FOR SOLUTION INTRAMUSCULAR; INTRAVENOUS at 17:42

## 2018-01-23 RX ADMIN — Medication 2 MG: at 11:37

## 2018-01-23 RX ADMIN — INSULIN LISPRO 1 UNITS: 100 INJECTION, SOLUTION INTRAVENOUS; SUBCUTANEOUS at 08:45

## 2018-01-23 RX ADMIN — MORPHINE SULFATE 15 MG: 15 TABLET, FILM COATED, EXTENDED RELEASE ORAL at 20:19

## 2018-01-23 ASSESSMENT — PAIN DESCRIPTION - PAIN TYPE
TYPE: CHRONIC PAIN

## 2018-01-23 ASSESSMENT — PAIN SCALES - GENERAL
PAINLEVEL_OUTOF10: 8
PAINLEVEL_OUTOF10: 8
PAINLEVEL_OUTOF10: 4
PAINLEVEL_OUTOF10: 8
PAINLEVEL_OUTOF10: 6
PAINLEVEL_OUTOF10: 6
PAINLEVEL_OUTOF10: 2
PAINLEVEL_OUTOF10: 7
PAINLEVEL_OUTOF10: 0
PAINLEVEL_OUTOF10: 8
PAINLEVEL_OUTOF10: 5
PAINLEVEL_OUTOF10: 8
PAINLEVEL_OUTOF10: 7

## 2018-01-23 ASSESSMENT — PAIN DESCRIPTION - DESCRIPTORS
DESCRIPTORS: ACHING

## 2018-01-23 ASSESSMENT — PAIN DESCRIPTION - FREQUENCY
FREQUENCY: CONTINUOUS

## 2018-01-23 ASSESSMENT — PAIN DESCRIPTION - LOCATION
LOCATION: BACK

## 2018-01-23 NOTE — PROGRESS NOTES
Pulmonary Critical Care Progress Note  . Patient seen for the follow up of Chronic respiratory failure, pneumonia, COPD exacerbation    Subjective:  He Has improved shortness of breath and is off BiPAP on 3.5 L oxygen nasal cannula. He has mild dry cough. .  She denies having chest pain. He has fair appetite. Examination:  Vitals: BP (!) 143/70   Pulse 102   Temp 98.1 °F (36.7 °C) (Infrared)   Resp 17   Ht 6' 1\" (1.854 m)   Wt 220 lb 14.4 oz (100.2 kg)   SpO2 96%   BMI 29.14 kg/m²     General appearance: alert and cooperative with exam  Neck: No JVD  Lungs: Decreased breath sounds no crackles or wheezing   Heart: regular rate and rhythm, S1, S2 normal, no gallop  Abdomen: Soft, non tender, + BS  Extremities: no cyanosis or clubbing. No significant edema    LABs:  CBC:   Recent Labs      01/22/18   0611  01/23/18   0608   WBC  8.9  10.1   HGB  9.9*  9.7*   HCT  28.9*  28.6*   PLT  67*  67*     BMP:   Recent Labs      01/22/18   0611  01/23/18   0608   NA  138  137   K  4.6  4.2   CO2  33*  34*   BUN  21  24*   CREATININE  0.66*  0.57*   LABGLOM  >60  >60   GLUCOSE  153*  167*     LIVER PROFILE:  Recent Labs      01/22/18   0611   AST  39   ALT  57*   LABALBU  3.2*   Cytology right pleural fluid 1/18  METASTATIC ADENOCARCINOMA  Clusters of malignant cells show intracytoplasmic mucin on mucicarmine   stain and are positive for CEA, and negative for both CK 20 and TTF-1.    Patient has known history of metastatic bone adenocarcinoma.  In a   man, a CEA positive adenocarcinoma, metastatic to bone, is most   commonly from lung primary    Radiology:  Chest x-ray 1/23/2018 interstitial opacities and left lower lobe opacity are the same    Impression:  · Chronic respiratory failure  · Healthcare associated pneumonia  · Acute exacerbation of COPD  · Metastatic lung cancer/adenocarcinoma in bone marrow and pleural fluid  · Pulmonary edema/ lymphangitic spread/malignant pleural effusion  · Smoking

## 2018-01-23 NOTE — PROGRESS NOTES
patent, no erythema, discharge or polyps  Mouth - mucous membranes moist, pharynx normal without lesions  Neck - supple, no significant adenopathy  Lymphatics - no palpable lymphadenopathy, no hepatosplenomegaly  Chest -decreased air entry with scattered rhonchi  Heart - normal rate, regular rhythm, normal S1, S2, no murmurs, rubs, clicks or gallops  Abdomen - soft, nontender, nondistended, no masses or organomegaly  Neurological - alert, oriented, normal speech, no focal findings or movement disorder noted  Musculoskeletal - no joint tenderness, deformity or swelling  Extremities - peripheral pulses normal, no pedal edema, no clubbing or cyanosis    Data:  No intake/output data recorded. In: 8225 [P.O.:760; I.V.:349]  Out: 2025 [Urine:2025]  CBC:   Recent Labs      01/21/18   0707  01/22/18   0611  01/23/18   0608   WBC  9.9  8.9  10.1   HGB  10.4*  9.9*  9.7*   PLT  83*  67*  67*     BMP:    Recent Labs      01/21/18   0707  01/22/18   0611  01/23/18   0608   NA  140  138  137   K  4.3  4.6  4.2   CL  98  96*  93*   CO2  30  33*  34*   BUN  21  21  24*   CREATININE  0.67*  0.66*  0.57*   GLUCOSE  153*  153*  167*     Hepatic:   Recent Labs      01/22/18   0611   AST  39   ALT  57*   BILITOT  0.71   ALKPHOS  883*     THORACENTESIS FLUID RIGHT:       METASTATIC ADENOCARCINOMA (SEE MICROSCOPIC DESCRIPTION).         INR:   No results for input(s): INR in the last 72 hours. IMAGING DATA:  CT chest 1/17/18  Impression   1. No evidence for acute pulmonary embolism.  Some artifacts decrease   sensitivity the segmental and subsegmental branches. 2. Worsening left pleural, stable right pleural effusion. 3. Worsening discrete and confluent geographic appearing irregular   ground-glass attenuation particularly in the upper lobes right greater than   left along with a new band in the right lower lobe which may represent acute   pneumonitis although remains nonspecific.    4. Stable diffuse interlobular septal

## 2018-01-23 NOTE — PLAN OF CARE
Problem: Pain:  Goal: Pain level will decrease  Pain level will decrease   Outcome: Ongoing    Goal: Control of acute pain  Control of acute pain   Outcome: Ongoing    Goal: Control of chronic pain  Control of chronic pain   Outcome: Ongoing      Problem: Falls - Risk of  Goal: Absence of falls  Outcome: Ongoing      Problem: Musculor/Skeletal Functional Status  Goal: Highest potential functional level  Outcome: Ongoing      Problem: Risk for Impaired Skin Integrity  Goal: Tissue integrity - skin and mucous membranes  Structural intactness and normal physiological function of skin and  mucous membranes.    Outcome: Ongoing

## 2018-01-23 NOTE — PROGRESS NOTES
EC/WS techs for use during ADLs and mob   Assessment   Performance deficits / Impairments Decreased functional mobility ; Decreased ADL status; Decreased strength;Decreased safe awareness;Decreased endurance;Decreased balance   Prognosis Good;Fair   Patient Education OT POC< safety during transfers, pursed lip breathing, ECWS, coordinating breathing with activity   REQUIRES OT FOLLOW UP Yes   Discharge Recommendations Subacute/Skilled Nursing Facility   Activity Tolerance   Activity Tolerance Patient Tolerated treatment well;Treatment limited secondary to medical complications (free text)  (decreased SpO2, increased HR)   Safety Devices   Safety Devices in place Yes   Type of devices Nurse notified; Left in bed;Patient at risk for falls;Gait belt;Call light within reach; All fall risk precautions in place   Restraints   Initially in place No   Other Comments   Comments TIME:2828-7603   Plan   Times per week 4-5x/week, 1-2x/day   Current Treatment Recommendations Strengthening;Balance Training;Functional Mobility Training; Endurance Training;Self-Care / ADL; Patient/Caregiver Education & Training; Safety Education & Training;Positioning   Upon arrival, pt seated in chair. Sit>stand, Demo stand step transfer to EOB. Sitting rest break required to recover from [address] Stood to complete dynamic balance/reaching ~30 seconds. Sitting break to recover. Sit>supine. Written and verbal edu provided to pt on safe ADL completion techniques and tips during bathing/showering, and toileting; EC/WS techniques of pacing, posturing, body mechanics, planning and organizing tasks, avoiding fatigue, and task simplification in regards to ADLs of eating, grooming, bathing/showering, dressing, and IADLs of cooking, meal cleanup, marketing and meal planning, laundry, bed making, and housework. Pt verbalize good understanding. Upon writer exit, call light within reach, pt retired to bed. All lines intact and patient positioned comfortably.  All

## 2018-01-24 ENCOUNTER — APPOINTMENT (OUTPATIENT)
Dept: GENERAL RADIOLOGY | Age: 63
DRG: 193 | End: 2018-01-24
Payer: COMMERCIAL

## 2018-01-24 LAB
ABSOLUTE EOS #: 0 K/UL (ref 0–0.4)
ABSOLUTE IMMATURE GRANULOCYTE: ABNORMAL K/UL (ref 0–0.3)
ABSOLUTE LYMPH #: 3.02 K/UL (ref 1–4.8)
ABSOLUTE MONO #: 1.26 K/UL (ref 0.2–0.8)
ANION GAP SERPL CALCULATED.3IONS-SCNC: 9 MMOL/L (ref 9–17)
BASOPHILS # BLD: 0 %
BASOPHILS ABSOLUTE: 0 K/UL (ref 0–0.2)
BUN BLDV-MCNC: 19 MG/DL (ref 8–23)
BUN/CREAT BLD: 35 (ref 9–20)
CALCIUM SERPL-MCNC: 8.3 MG/DL (ref 8.6–10.4)
CHLORIDE BLD-SCNC: 93 MMOL/L (ref 98–107)
CO2: 34 MMOL/L (ref 20–31)
CREAT SERPL-MCNC: 0.55 MG/DL (ref 0.7–1.2)
DIFFERENTIAL TYPE: ABNORMAL
EOSINOPHILS RELATIVE PERCENT: 0 % (ref 1–4)
GFR AFRICAN AMERICAN: >60 ML/MIN
GFR NON-AFRICAN AMERICAN: >60 ML/MIN
GFR SERPL CREATININE-BSD FRML MDRD: ABNORMAL ML/MIN/{1.73_M2}
GFR SERPL CREATININE-BSD FRML MDRD: ABNORMAL ML/MIN/{1.73_M2}
GLUCOSE BLD-MCNC: 117 MG/DL (ref 75–110)
GLUCOSE BLD-MCNC: 141 MG/DL (ref 75–110)
GLUCOSE BLD-MCNC: 142 MG/DL (ref 70–99)
GLUCOSE BLD-MCNC: 203 MG/DL (ref 75–110)
HCT VFR BLD CALC: 27.6 % (ref 41–53)
HEMOGLOBIN: 9.3 G/DL (ref 13.5–17.5)
IMMATURE GRANULOCYTES: ABNORMAL %
LYMPHOCYTES # BLD: 24 % (ref 24–44)
MCH RBC QN AUTO: 28.7 PG (ref 26–34)
MCHC RBC AUTO-ENTMCNC: 33.8 G/DL (ref 31–37)
MCV RBC AUTO: 85 FL (ref 80–100)
METAMYELOCYTES ABSOLUTE COUNT: 0.5 K/UL
METAMYELOCYTES: 4 %
MONOCYTES # BLD: 10 % (ref 1–7)
MORPHOLOGY: ABNORMAL
MYELOCYTES ABSOLUTE COUNT: 0.5 K/UL
MYELOCYTES: 4 %
NRBC AUTOMATED: ABNORMAL PER 100 WBC
NUCLEATED RED BLOOD CELLS: 6 PER 100 WBC
PDW BLD-RTO: 15.6 % (ref 11.5–14.5)
PLATELET # BLD: 76 K/UL (ref 130–400)
PLATELET ESTIMATE: ABNORMAL
PMV BLD AUTO: 7.7 FL (ref 6–12)
POTASSIUM SERPL-SCNC: 4 MMOL/L (ref 3.7–5.3)
RBC # BLD: 3.25 M/UL (ref 4.5–5.9)
RBC # BLD: ABNORMAL 10*6/UL
SEG NEUTROPHILS: 58 % (ref 36–66)
SEGMENTED NEUTROPHILS ABSOLUTE COUNT: 7.32 K/UL (ref 1.8–7.7)
SODIUM BLD-SCNC: 136 MMOL/L (ref 135–144)
WBC # BLD: 12.6 K/UL (ref 3.5–11)
WBC # BLD: ABNORMAL 10*3/UL

## 2018-01-24 PROCEDURE — 97530 THERAPEUTIC ACTIVITIES: CPT

## 2018-01-24 PROCEDURE — 2580000003 HC RX 258: Performed by: INTERNAL MEDICINE

## 2018-01-24 PROCEDURE — 6370000000 HC RX 637 (ALT 250 FOR IP): Performed by: INTERNAL MEDICINE

## 2018-01-24 PROCEDURE — 2060000000 HC ICU INTERMEDIATE R&B

## 2018-01-24 PROCEDURE — 99232 SBSQ HOSP IP/OBS MODERATE 35: CPT | Performed by: INTERNAL MEDICINE

## 2018-01-24 PROCEDURE — 6360000002 HC RX W HCPCS: Performed by: NURSE PRACTITIONER

## 2018-01-24 PROCEDURE — 6360000002 HC RX W HCPCS: Performed by: INTERNAL MEDICINE

## 2018-01-24 PROCEDURE — 82947 ASSAY GLUCOSE BLOOD QUANT: CPT

## 2018-01-24 PROCEDURE — 85025 COMPLETE CBC W/AUTO DIFF WBC: CPT

## 2018-01-24 PROCEDURE — 80048 BASIC METABOLIC PNL TOTAL CA: CPT

## 2018-01-24 PROCEDURE — 97110 THERAPEUTIC EXERCISES: CPT

## 2018-01-24 PROCEDURE — 71045 X-RAY EXAM CHEST 1 VIEW: CPT

## 2018-01-24 PROCEDURE — 94761 N-INVAS EAR/PLS OXIMETRY MLT: CPT

## 2018-01-24 PROCEDURE — 2500000003 HC RX 250 WO HCPCS: Performed by: INTERNAL MEDICINE

## 2018-01-24 PROCEDURE — 94640 AIRWAY INHALATION TREATMENT: CPT

## 2018-01-24 PROCEDURE — 94660 CPAP INITIATION&MGMT: CPT

## 2018-01-24 PROCEDURE — 36415 COLL VENOUS BLD VENIPUNCTURE: CPT

## 2018-01-24 PROCEDURE — 2700000000 HC OXYGEN THERAPY PER DAY

## 2018-01-24 RX ORDER — METHYLPREDNISOLONE SODIUM SUCCINATE 40 MG/ML
30 INJECTION, POWDER, LYOPHILIZED, FOR SOLUTION INTRAMUSCULAR; INTRAVENOUS EVERY 12 HOURS
Status: DISCONTINUED | OUTPATIENT
Start: 2018-01-24 | End: 2018-01-25

## 2018-01-24 RX ADMIN — Medication 2 MG: at 06:46

## 2018-01-24 RX ADMIN — TAZOBACTAM SODIUM AND PIPERACILLIN SODIUM 3.38 G: 375; 3 INJECTION, SOLUTION INTRAVENOUS at 12:18

## 2018-01-24 RX ADMIN — MORPHINE SULFATE 15 MG: 15 TABLET, FILM COATED, EXTENDED RELEASE ORAL at 22:25

## 2018-01-24 RX ADMIN — METHYLPREDNISOLONE SODIUM SUCCINATE 30 MG: 40 INJECTION, POWDER, FOR SOLUTION INTRAMUSCULAR; INTRAVENOUS at 18:03

## 2018-01-24 RX ADMIN — BUDESONIDE 500 MCG: 0.5 SUSPENSION RESPIRATORY (INHALATION) at 05:45

## 2018-01-24 RX ADMIN — INSULIN LISPRO 1 UNITS: 100 INJECTION, SOLUTION INTRAVENOUS; SUBCUTANEOUS at 12:22

## 2018-01-24 RX ADMIN — BUDESONIDE 500 MCG: 0.5 SUSPENSION RESPIRATORY (INHALATION) at 20:01

## 2018-01-24 RX ADMIN — GUAIFENESIN 600 MG: 600 TABLET, EXTENDED RELEASE ORAL at 08:34

## 2018-01-24 RX ADMIN — GUAIFENESIN 600 MG: 600 TABLET, EXTENDED RELEASE ORAL at 22:24

## 2018-01-24 RX ADMIN — Medication 10 ML: at 08:45

## 2018-01-24 RX ADMIN — TAZOBACTAM SODIUM AND PIPERACILLIN SODIUM 3.38 G: 375; 3 INJECTION, SOLUTION INTRAVENOUS at 04:59

## 2018-01-24 RX ADMIN — MORPHINE SULFATE 15 MG: 15 TABLET, FILM COATED, EXTENDED RELEASE ORAL at 08:32

## 2018-01-24 RX ADMIN — IPRATROPIUM BROMIDE AND ALBUTEROL SULFATE 1 AMPULE: .5; 3 SOLUTION RESPIRATORY (INHALATION) at 11:36

## 2018-01-24 RX ADMIN — ALPRAZOLAM 0.25 MG: 0.25 TABLET ORAL at 20:31

## 2018-01-24 RX ADMIN — FLUOXETINE 20 MG: 20 CAPSULE ORAL at 08:34

## 2018-01-24 RX ADMIN — ENOXAPARIN SODIUM 30 MG: 30 INJECTION SUBCUTANEOUS at 08:34

## 2018-01-24 RX ADMIN — ENOXAPARIN SODIUM 30 MG: 30 INJECTION SUBCUTANEOUS at 22:24

## 2018-01-24 RX ADMIN — METHYLPREDNISOLONE SODIUM SUCCINATE 40 MG: 40 INJECTION, POWDER, FOR SOLUTION INTRAMUSCULAR; INTRAVENOUS at 06:51

## 2018-01-24 RX ADMIN — IPRATROPIUM BROMIDE AND ALBUTEROL SULFATE 1 AMPULE: .5; 3 SOLUTION RESPIRATORY (INHALATION) at 05:46

## 2018-01-24 RX ADMIN — PANTOPRAZOLE SODIUM 40 MG: 40 TABLET, DELAYED RELEASE ORAL at 06:46

## 2018-01-24 RX ADMIN — FORMOTEROL FUMARATE DIHYDRATE 20 MCG: 20 SOLUTION RESPIRATORY (INHALATION) at 06:20

## 2018-01-24 RX ADMIN — FORMOTEROL FUMARATE DIHYDRATE 20 MCG: 20 SOLUTION RESPIRATORY (INHALATION) at 20:00

## 2018-01-24 RX ADMIN — Medication 2 MG: at 20:33

## 2018-01-24 RX ADMIN — INSULIN LISPRO 1 UNITS: 100 INJECTION, SOLUTION INTRAVENOUS; SUBCUTANEOUS at 08:37

## 2018-01-24 RX ADMIN — IPRATROPIUM BROMIDE AND ALBUTEROL SULFATE 1 AMPULE: .5; 3 SOLUTION RESPIRATORY (INHALATION) at 14:23

## 2018-01-24 RX ADMIN — TAZOBACTAM SODIUM AND PIPERACILLIN SODIUM 3.38 G: 375; 3 INJECTION, SOLUTION INTRAVENOUS at 20:26

## 2018-01-24 RX ADMIN — INSULIN LISPRO 2 UNITS: 100 INJECTION, SOLUTION INTRAVENOUS; SUBCUTANEOUS at 18:00

## 2018-01-24 RX ADMIN — Medication 2 MG: at 09:29

## 2018-01-24 RX ADMIN — IPRATROPIUM BROMIDE AND ALBUTEROL SULFATE 1 AMPULE: .5; 3 SOLUTION RESPIRATORY (INHALATION) at 20:01

## 2018-01-24 ASSESSMENT — PAIN DESCRIPTION - PAIN TYPE
TYPE: CHRONIC PAIN
TYPE: CHRONIC PAIN
TYPE: ACUTE PAIN
TYPE: ACUTE PAIN

## 2018-01-24 ASSESSMENT — PAIN SCALES - GENERAL
PAINLEVEL_OUTOF10: 7
PAINLEVEL_OUTOF10: 10
PAINLEVEL_OUTOF10: 7
PAINLEVEL_OUTOF10: 6

## 2018-01-24 ASSESSMENT — PAIN DESCRIPTION - LOCATION
LOCATION: BACK

## 2018-01-24 ASSESSMENT — PAIN DESCRIPTION - DESCRIPTORS: DESCRIPTORS: ACHING

## 2018-01-24 ASSESSMENT — PAIN DESCRIPTION - ORIENTATION
ORIENTATION: RIGHT;LEFT
ORIENTATION: RIGHT;LEFT

## 2018-01-24 NOTE — PROGRESS NOTES
Physical Therapy  DATE: 2018    NAME: Ashlee Ugarte  MRN: [id number]   : 1955    Patient not seen this date for Physical Therapy due to:  [] Blood transfusion in progress  [] Cancel by RN  [] Hemodialysis  []  Refusal by Patient   [] Spine Precautions   [] Strict Bedrest  [] Surgery  [] Testing      [x] Other (Patient currently with OT for treatment)         [] PT being discontinued at this time. Patient independent. No further needs. [] PT being discontinued at this time as the patient has been transferred to hospice care. No further needs.     Anurag Landers, PTA

## 2018-01-24 NOTE — PROGRESS NOTES
Pulmonary Critical Care Progress Note  Zander Landa M.D. Patient seen for the follow up of Chronic respiratory failure, pneumonia, COPD exacerbation, Metastatic lung cancer    Subjective:  He continues to be short of breath though slightly improved compared to yesterday. He continues to have a congested frequent cough. He denies chest pain. He is tolerating orals. Examination:  Vitals: /64   Pulse 87   Temp 98.2 °F (36.8 °C) (Infrared)   Resp 13   Ht 6' 1\" (1.854 m)   Wt 220 lb 14.4 oz (100.2 kg)   SpO2 98%   BMI 29.14 kg/m²     General appearance: alert and cooperative with exam  Neck: No JVD  Lungs: Decreased air exchange+ rhonchi, slight wheeze, increased work of breathing  Heart: regular rate and rhythm, S1, S2 normal, no gallop  Abdomen: Soft, non tender, + BS  Extremities: no cyanosis or clubbing.  No significant edema    LABs:  CBC:   Recent Labs      01/23/18   0608  01/24/18   0430   WBC  10.1  12.6*   HGB  9.7*  9.3*   HCT  28.6*  27.6*   PLT  67*  76*     BMP:   Recent Labs      01/23/18   0608  01/24/18   0430   NA  137  136   K  4.2  4.0   CO2  34*  34*   BUN  24*  19   CREATININE  0.57*  0.55*   LABGLOM  >60  >60   GLUCOSE  167*  142*     LIVER PROFILE:  Recent Labs      01/22/18   0611   AST  39   ALT  57*   LABALBU  3.2*     Radiology:  1/24/18      Impression:  · Acute on chronic respiratory failure  · Healthcare associated pneumonia  · Acute exacerbation of COPD  · Interstitial lung disease/lymphangitic spread of adenocarcinoma   · Small bilateral pleural effusions/atelectasis  · Recent left-sided thoracentesis on 12/27/17, fluid negative for malignancy   · Right-sided thoracentesis on 1/19/18 showing malignant cells  · Stage IV adenocarcinoma most likely lung primary  · Smoking history    Recommendations:  · 2-3 liters/min via nasal cannula  · BiPAP as needed  · Continue IV Zosyn, infectious disease on consult  · Decrease Solu-Medrol  · Albuterol Q 4 hours and prn  · Pulmicort and Perforomist aerosols every 12 hours  · DVT prophylaxis with low molecular weight heparin  · PT/OT  · Nicotine patch  · Recommend discharge planning to ECF once ready for discharge  · Will follow with you    Electronically signed by     Hakan Garcia MD on 1/24/2018 at 7:38 AM  Pulmonary Critical Care and Sleep Medicine,  [facility]  Cell: [phone]  Office: [phone]

## 2018-01-24 NOTE — CARE COORDINATION
TOMY met with pt and family to discuss discharge planning. SW explained PT/OT recommendations regarding SNF. Pt is agreeable the family requested referrals to [facility], [place], [address] [place] [address], and [facility]. TOMY will fax to pt first two choices.

## 2018-01-24 NOTE — PROGRESS NOTES
bony metastasis and possible lung metastasis. The patient did have a left-sided thoracentesis done and the fluid was negative for malignancy. The patient was discharged on antibiotics and tapering steroids with plans for an outpatient PET CT scan and bone marrow biopsy. The patient underwent a bone marrow biopsy which showed adenocarcinoma. The PET CT scan confirmed bony metastasis but no primary cancer was identified.     The patient had been seen by the pulmonary service and the day prior to his admission and the has been getting progressive shortness of breath as well as back pain. The patient also has had a cough. He was sent into the emergency room for further evaluation. The patient did have a left-sided thoracentesis done and they were gram-positive cocci isolated in the fluid. Vancomycin was started and I was asked to evaluate and help with antibiotic choice. Current evaluation:2018    /72   Pulse 99   Temp 98.1 °F (36.7 °C) (Infrared)   Resp 16   Ht 6' 1\" (1.854 m)   Wt 220 lb 14.4 oz (100.2 kg)   SpO2 94%   BMI 29.14 kg/m²     Temperature Range: Temp: 98.1 °F (36.7 °C) Temp  Av.4 °F (36.9 °C)  Min: 98.1 °F (36.7 °C)  Max: 98.8 °F (37.1 °C)    The patient is seen and evaluated at bedside he is awake and alert in no acute distress. I did discuss the care with the nurse who reports that he had some episodes of hypoxia earlier today. The patient has been using the BiPAP intermittently. The daughter is with him during my visit. He does not report any fevers or chills. Still does have some dry cough. He was able to work with the physical therapy transferring from the bed to the chair but he did not ambulate in the hallways as of yet. Physical Examination :     Physical Exam   Constitutional: He is oriented to person, place, and time. HENT:   Head: Normocephalic and atraumatic. Eyes: Pupils are equal, round, and reactive to light.    Cardiovascular: Regular rhythm and at [address] [facility]    Specimen Description Edi, [address] [facility] [phone]    Special Requests NOT REPORTED    Direct Exam FEW NEUTROPHILS (A)    Direct Exam NO BACTERIA SEEN    Direct Exam Gram stain made from cytocentrifuged specimen.  Organisms and cells will be    Direct Exam  concentrated.     Culture POSITIVE FLUID CULTURE, RN NOTIFIED CHERYL CURRIE AT 1409 ON 1/19/18 (A)    Culture DIRECT GRAM STAIN FROM BOTTLE: GRAM POSITIVE COCCI IN CLUSTERS (A)    Culture STAPHYLOCOCCUS HOMINIS (A)    Culture Performed at [facility] [address] [facility], [address] [place] [address] [phone]    Status FINAL 01/21/2018    Organism SHOM   STAPHYLOCOCCUS HOMINIS     Antibiotic Interpretation FRED Status   Induced Clind Resist Resistant POSITIVE Final   Synercid  NOT REPORTED Final   cefoxitin screen  NOT REPORTED Final   ciprofloxacin  NOT REPORTED Final   clindamycin Resistant <=0.25 RESISTANT Final   erythromycin Resistant >=8 RESISTANT Final   gentamicin Sensitive <=0.5 SUSCEPTIBLE Final   levofloxacin Resistant >=8 RESISTANT Final   linezolid  NOT REPORTED Final   moxifloxacin  NOT REPORTED Final   nitrofurantoin  NOT REPORTED Final   oxacillin Resistant 1 RESISTANT Final   penicillin Resistant >=0.5 RESISTANT Final   rifampin  NOT REPORTED Final   tetracycline Resistant >=16 RESISTANT Final   tigecycline  NOT REPORTED Final   trimethoprim-sulfamethoxazole Resistant 80 RESISTANT Final   vancomycin Sensitive 1 SUSCEPTIBLE Final       Medications:      methylPREDNISolone  30 mg Intravenous Q12H    morphine  15 mg Oral 2 times per day    guaiFENesin  600 mg Oral BID    ipratropium-albuterol  1 ampule Inhalation Q4H WA    budesonide  0.5 mg Nebulization BID    formoterol  20 mcg Nebulization Q12H    piperacillin-tazobactam  3.375 g Intravenous Q8H    enoxaparin  30 mg Subcutaneous BID    pantoprazole  40 mg Oral QAM AC    nicotine  1 patch Transdermal Daily    sodium chloride flush  10 mL

## 2018-01-24 NOTE — PROGRESS NOTES
Follow-up COPD  Doing better less shortness of breath less cough no PND no orthopnea less tachypnea  Review of system  No fever no chills no GI/ complaints except mild constipation no cardiac complaints, no polyuria no polydipsia no hypoglycemia, no TIA no bleeding no sinusitis pain no headaches no sore throat  Vitals stable blood pressure slightly elevated  Eyes mild pallor no jaundice  Neck supple no JVD  Lungs air entry equal decreased at the bases few rhonchi  Heart regular rate and rhythm no gallop  Abdomen soft positive bowel sounds without any tenderness  Extremities good pulses without edema negative Homans sign  Neurologic alert oriented ×3 with no focal deficit  Assessment and plan  COPD exacerbation  Pneumonia versus lymphangitic spread of the cancer  Metastatic adenocarcinoma unknown primary with malignant right pleural effusion  Normocytic anemia  Thrombocytopenia  Prediabetes  Meds labs reviewed will hold Lovenox.   Due to thrombocytopenia continue with EPC cuffs continue with steroids and antibiotics physical therapy occupational therapy transfer to [facility] area continue with insulin coverage as needed see orders

## 2018-01-24 NOTE — PROGRESS NOTES
Occupational Therapy  DATE: 2018    NAME: Coni Eldridge  MRN: [id number]   : 1955  [facility]  Occupational Therapy Not Seen Note    Patient not available for Occupational Therapy due to:    [] Testing:    [] Hemodialysis    [] Cancelled by RN: Pt did not sleep well last night, received morphine and is on bipap    []Refusal by Patient:    [] Surgery:     [] Intubation:     [] Pain Medication:    [] Sedation:     [] Spine Precautions :    [] Medical Instability:    [] Other:        Fidelina OCHOA/L

## 2018-01-24 NOTE — CARE COORDINATION
TOMY received call from pt RN Abida regarding Dr. Segal Dural requested a referral to an [facility]. Abida reports speaking with pt and family regarding choices for LTAC and the family chose [facility].   TOMY called Pradip Velásquez with [facility] to make referral.

## 2018-01-24 NOTE — PROGRESS NOTES
Occupational Therapy  Facility/Department: [facility] ICU  Daily Treatment Note  NAME: Gucci Sesay  : 1955  MRN: [id number]    Date of Service: 2018     RN reports patient is medically stable for therapy treatment this date. Chart reviewed prior to treatment and patient is agreeable for therapy. All lines intact and patient positioned comfortably at end of treatment. All patient needs addressed prior to ending therapy session. Discharge Recommendation:   [address]      Patient Diagnosis(es): The primary encounter diagnosis was Pneumonia of right upper lobe due to infectious organism [facility]). A diagnosis of Pleural effusion was also pertinent to this visit. has a past medical history of Chronic fatigue; COPD (chronic obstructive pulmonary disease) ([facility] Depression; and Metastatic adenocarcinoma to bone marrow ([facility] has a past surgical history that includes Appendectomy. Restrictions  Restrictions/Precautions  Restrictions/Precautions: General Precautions, Fall Risk  Required Braces or Orthoses?: No  Position Activity Restriction  Other position/activity restrictions:  Up with assist, monitor 02 and HR  Subjective   General  Chart Reviewed: Yes  Patient assessed for rehabilitation services?: Yes  Response to previous treatment: Patient with no complaints from previous session  Family / Caregiver Present: Yes (daughter Elton Wang)      Orientation  Orientation  Overall Orientation Status: Within Normal Limits  Objective             Balance  Sitting Balance: Supervision  Standing Balance: Contact guard assistance  Standing Balance  Time: 1.5 minutes, second trial 1 minute   Activity: Stood with RW, SPO2 at 90 or above, rested in between trials, SPO2 dropped to 88 when sitting and resting, quickly back to 90+  Sit to stand: Contact guard assistance  Stand to sit: Minimal assistance (VC for hand placement, pt unsure of self, confidence increased with 2nd trial )  Comment: Pt sitting upright in chair, reported he felt this helped breathing   Functional Mobility  Functional - Mobility Device: Rolling Walker     Transfers  Sit to stand: Contact guard assistance  Stand to sit: Minimal assistance (VC for hand placement, pt unsure of self, confidence increased with 2nd trial )                       Cognition  Arousal/Alertness: Appropriate responses to stimuli  Following Commands: Follows multistep commands with increased time  Cognition Comment: Pt verbalized new dx of CA, and stated he was going to work to get stronger                    Type of ROM/Therapeutic Exercise  Type of ROM/Therapeutic Exercise: AROM  Comment: UE AROM elbow flexion 10 reps x 2. Shoulder flexion 10 reps. arm rolling in midline 10 reps x 2. rested in between each set. focused on deep breathing                     Assessment   Performance deficits / Impairments: Decreased functional mobility ; Decreased ADL status; Decreased strength;Decreased safe awareness;Decreased endurance;Decreased balance  Assessment: Pt up in chair, UE  AROM with VC on breathing completed, standing x2. Pt needs reassurance and needs to build confidence. Pt joking a few times during session   Prognosis: Good  Decision Making: Medium Complexity  Patient Education: OT POC< safety during transfers, pursed lip breathing, ECWS, coordinating breathing with activity  Discharge Recommendations: Subacute/Skilled Nursing Facility  REQUIRES OT FOLLOW UP: Yes  Activity Tolerance  Activity Tolerance: Patient limited by fatigue;Treatment limited secondary to medical complications (free text)  Activity Tolerance: Pt aware of tolerance, and breathing. Reassurance provided throughout session   Safety Devices  Safety Devices in place: Yes  Type of devices: All fall risk precautions in place;Call light within reach; Left in chair;Nurse notified;Gait belt  Restraints  Initially in place: No       Discharge Recommendations:  Subacute/Skilled Nursing Facility     Plan   Plan  Times per week: 4-5x/week, 1-2x/day  Current Treatment Recommendations: Strengthening, Balance Training, Functional Mobility Training, Endurance Training, Self-Care / ADL, Patient/Caregiver Education & Training, Safety Education & Training, Positioning  G-Code     OutComes Score                                           AM-PAC Score             Goals  Short term goals  Time Frame for Short term goals: by discharge, pt will  Short term goal 1: demo SBA with ADL transfers with good safety and approp AD  Short term goal 2: demo SBA with functional mob in room for ADL completion with  good pacing and safety  Short term goal 3: demo min A with toileting routine  Short term goal 4: demo min A with  UB ADLs and mod A LB ADLs with good pacing and DME as needed  Short term goal 5: verb good understanding of fall prevention techs, safety, and EC/WS techs for use during ADLs and mob  Patient Goals   Patient goals : to go home when able       Therapy Time   Individual Concurrent Group Co-treatment   Time In 1350         Time Out 1418         Minutes [place], [place]

## 2018-01-24 NOTE — FLOWSHEET NOTE
Patient resting in bed. He engages in brief conversation with writer, expressing no specific needs. Writer offers brief supportive conversation and listening presence. Patient expresses thanks. Spiritual Care will follow as needed.      01/24/18 0954   Encounter Summary   Services provided to: Patient   Referral/Consult From: Roger   Continue Visiting (1/24/18)   Complexity of Encounter Low   Length of Encounter 15 minutes   Routine   Type Follow up   Assessment Approachable   Intervention Active listening   Outcome Engaged in conversation;Expressed gratitude

## 2018-01-25 ENCOUNTER — TELEPHONE (OUTPATIENT)
Dept: CASE MANAGEMENT | Age: 63
End: 2018-01-25

## 2018-01-25 LAB
ABSOLUTE BANDS #: 1.28 K/UL
ABSOLUTE EOS #: 0.14 K/UL (ref 0–0.4)
ABSOLUTE IMMATURE GRANULOCYTE: ABNORMAL K/UL (ref 0–0.3)
ABSOLUTE LYMPH #: 2.98 K/UL (ref 1–4.8)
ABSOLUTE MONO #: 2.41 K/UL (ref 0.2–0.8)
ANION GAP SERPL CALCULATED.3IONS-SCNC: 8 MMOL/L (ref 9–17)
BANDS: 9 % (ref 1–15)
BASOPHILS # BLD: 0 %
BASOPHILS ABSOLUTE: 0 K/UL (ref 0–0.2)
BUN BLDV-MCNC: 19 MG/DL (ref 8–23)
BUN/CREAT BLD: 35 (ref 9–20)
CALCIUM SERPL-MCNC: 8.1 MG/DL (ref 8.6–10.4)
CHLORIDE BLD-SCNC: 91 MMOL/L (ref 98–107)
CO2: 36 MMOL/L (ref 20–31)
CREAT SERPL-MCNC: 0.54 MG/DL (ref 0.7–1.2)
DIFFERENTIAL TYPE: ABNORMAL
EOSINOPHILS RELATIVE PERCENT: 1 % (ref 1–4)
GFR AFRICAN AMERICAN: >60 ML/MIN
GFR NON-AFRICAN AMERICAN: >60 ML/MIN
GFR SERPL CREATININE-BSD FRML MDRD: ABNORMAL ML/MIN/{1.73_M2}
GFR SERPL CREATININE-BSD FRML MDRD: ABNORMAL ML/MIN/{1.73_M2}
GLUCOSE BLD-MCNC: 109 MG/DL (ref 75–110)
GLUCOSE BLD-MCNC: 126 MG/DL (ref 70–99)
GLUCOSE BLD-MCNC: 176 MG/DL (ref 75–110)
GLUCOSE BLD-MCNC: 182 MG/DL (ref 75–110)
HCT VFR BLD CALC: 28 % (ref 41–53)
HEMOGLOBIN: 9.5 G/DL (ref 13.5–17.5)
IMMATURE GRANULOCYTES: ABNORMAL %
LYMPHOCYTES # BLD: 21 % (ref 24–44)
MCH RBC QN AUTO: 28.4 PG (ref 26–34)
MCHC RBC AUTO-ENTMCNC: 33.7 G/DL (ref 31–37)
MCV RBC AUTO: 84.1 FL (ref 80–100)
MONOCYTES # BLD: 17 % (ref 1–7)
NRBC AUTOMATED: ABNORMAL PER 100 WBC
NUCLEATED RED BLOOD CELLS: 3 PER 100 WBC
PDW BLD-RTO: 14.7 % (ref 11.5–14.5)
PLATELET # BLD: 75 K/UL (ref 130–400)
PLATELET ESTIMATE: ABNORMAL
PMV BLD AUTO: ABNORMAL FL (ref 6–12)
POTASSIUM SERPL-SCNC: 4.3 MMOL/L (ref 3.7–5.3)
RBC # BLD: 3.33 M/UL (ref 4.5–5.9)
RBC # BLD: ABNORMAL 10*6/UL
SEG NEUTROPHILS: 52 % (ref 36–66)
SEGMENTED NEUTROPHILS ABSOLUTE COUNT: 7.39 K/UL (ref 1.8–7.7)
SODIUM BLD-SCNC: 135 MMOL/L (ref 135–144)
WBC # BLD: 14.2 K/UL (ref 3.5–11)
WBC # BLD: ABNORMAL 10*3/UL

## 2018-01-25 PROCEDURE — 82947 ASSAY GLUCOSE BLOOD QUANT: CPT

## 2018-01-25 PROCEDURE — 2500000003 HC RX 250 WO HCPCS: Performed by: INTERNAL MEDICINE

## 2018-01-25 PROCEDURE — 6360000002 HC RX W HCPCS: Performed by: INTERNAL MEDICINE

## 2018-01-25 PROCEDURE — 80048 BASIC METABOLIC PNL TOTAL CA: CPT

## 2018-01-25 PROCEDURE — 97530 THERAPEUTIC ACTIVITIES: CPT | Performed by: NURSE PRACTITIONER

## 2018-01-25 PROCEDURE — 6370000000 HC RX 637 (ALT 250 FOR IP): Performed by: INTERNAL MEDICINE

## 2018-01-25 PROCEDURE — 97530 THERAPEUTIC ACTIVITIES: CPT

## 2018-01-25 PROCEDURE — 99232 SBSQ HOSP IP/OBS MODERATE 35: CPT | Performed by: INTERNAL MEDICINE

## 2018-01-25 PROCEDURE — 94660 CPAP INITIATION&MGMT: CPT

## 2018-01-25 PROCEDURE — 94640 AIRWAY INHALATION TREATMENT: CPT

## 2018-01-25 PROCEDURE — 6360000002 HC RX W HCPCS: Performed by: NURSE PRACTITIONER

## 2018-01-25 PROCEDURE — 85025 COMPLETE CBC W/AUTO DIFF WBC: CPT

## 2018-01-25 PROCEDURE — 2060000000 HC ICU INTERMEDIATE R&B

## 2018-01-25 PROCEDURE — 36415 COLL VENOUS BLD VENIPUNCTURE: CPT

## 2018-01-25 PROCEDURE — 94761 N-INVAS EAR/PLS OXIMETRY MLT: CPT

## 2018-01-25 RX ORDER — PREDNISONE 20 MG/1
20 TABLET ORAL 2 TIMES DAILY
Status: DISCONTINUED | OUTPATIENT
Start: 2018-01-25 | End: 2018-01-26

## 2018-01-25 RX ADMIN — IPRATROPIUM BROMIDE AND ALBUTEROL SULFATE 1 AMPULE: .5; 3 SOLUTION RESPIRATORY (INHALATION) at 07:06

## 2018-01-25 RX ADMIN — IPRATROPIUM BROMIDE AND ALBUTEROL SULFATE 1 AMPULE: .5; 3 SOLUTION RESPIRATORY (INHALATION) at 18:19

## 2018-01-25 RX ADMIN — FLUOXETINE 20 MG: 20 CAPSULE ORAL at 08:42

## 2018-01-25 RX ADMIN — TAZOBACTAM SODIUM AND PIPERACILLIN SODIUM 3.38 G: 375; 3 INJECTION, SOLUTION INTRAVENOUS at 12:45

## 2018-01-25 RX ADMIN — INSULIN LISPRO 1 UNITS: 100 INJECTION, SOLUTION INTRAVENOUS; SUBCUTANEOUS at 12:40

## 2018-01-25 RX ADMIN — ENOXAPARIN SODIUM 30 MG: 30 INJECTION SUBCUTANEOUS at 21:24

## 2018-01-25 RX ADMIN — FORMOTEROL FUMARATE DIHYDRATE 20 MCG: 20 SOLUTION RESPIRATORY (INHALATION) at 07:06

## 2018-01-25 RX ADMIN — ENOXAPARIN SODIUM 30 MG: 30 INJECTION SUBCUTANEOUS at 08:41

## 2018-01-25 RX ADMIN — GUAIFENESIN 600 MG: 600 TABLET, EXTENDED RELEASE ORAL at 08:42

## 2018-01-25 RX ADMIN — METHYLPREDNISOLONE SODIUM SUCCINATE 30 MG: 40 INJECTION, POWDER, FOR SOLUTION INTRAMUSCULAR; INTRAVENOUS at 06:30

## 2018-01-25 RX ADMIN — Medication 2 MG: at 17:25

## 2018-01-25 RX ADMIN — IPRATROPIUM BROMIDE AND ALBUTEROL SULFATE 1 AMPULE: .5; 3 SOLUTION RESPIRATORY (INHALATION) at 15:09

## 2018-01-25 RX ADMIN — MORPHINE SULFATE 15 MG: 15 TABLET, FILM COATED, EXTENDED RELEASE ORAL at 08:49

## 2018-01-25 RX ADMIN — Medication 2 MG: at 07:35

## 2018-01-25 RX ADMIN — PANTOPRAZOLE SODIUM 40 MG: 40 TABLET, DELAYED RELEASE ORAL at 08:42

## 2018-01-25 RX ADMIN — PREDNISONE 20 MG: 20 TABLET ORAL at 12:40

## 2018-01-25 RX ADMIN — PREDNISONE 20 MG: 20 TABLET ORAL at 21:24

## 2018-01-25 RX ADMIN — MORPHINE SULFATE 15 MG: 15 TABLET, FILM COATED, EXTENDED RELEASE ORAL at 21:24

## 2018-01-25 RX ADMIN — BUDESONIDE 500 MCG: 0.5 SUSPENSION RESPIRATORY (INHALATION) at 18:19

## 2018-01-25 RX ADMIN — GUAIFENESIN 600 MG: 600 TABLET, EXTENDED RELEASE ORAL at 21:24

## 2018-01-25 RX ADMIN — TAZOBACTAM SODIUM AND PIPERACILLIN SODIUM 3.38 G: 375; 3 INJECTION, SOLUTION INTRAVENOUS at 05:05

## 2018-01-25 RX ADMIN — ALPRAZOLAM 0.25 MG: 0.25 TABLET ORAL at 21:24

## 2018-01-25 RX ADMIN — IPRATROPIUM BROMIDE AND ALBUTEROL SULFATE 1 AMPULE: .5; 3 SOLUTION RESPIRATORY (INHALATION) at 10:19

## 2018-01-25 RX ADMIN — FORMOTEROL FUMARATE DIHYDRATE 20 MCG: 20 SOLUTION RESPIRATORY (INHALATION) at 18:20

## 2018-01-25 RX ADMIN — BUDESONIDE 500 MCG: 0.5 SUSPENSION RESPIRATORY (INHALATION) at 07:06

## 2018-01-25 RX ADMIN — INSULIN LISPRO 1 UNITS: 100 INJECTION, SOLUTION INTRAVENOUS; SUBCUTANEOUS at 21:24

## 2018-01-25 ASSESSMENT — PAIN DESCRIPTION - PAIN TYPE: TYPE: ACUTE PAIN

## 2018-01-25 ASSESSMENT — PAIN SCALES - GENERAL
PAINLEVEL_OUTOF10: 5
PAINLEVEL_OUTOF10: 6
PAINLEVEL_OUTOF10: 8
PAINLEVEL_OUTOF10: 6

## 2018-01-25 ASSESSMENT — PAIN DESCRIPTION - LOCATION: LOCATION: BACK

## 2018-01-25 NOTE — PROGRESS NOTES
([facility] [C80.1]     Bone metastasis ([facility] [C79.51]     COPD exacerbation ([facility] [J44.1] 01/17/2018    Pneumonia of right upper lobe due to infectious organism 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 (H[facility])       Allergies: No Known Allergies     Medications:   Scheduled Meds:   predniSONE  20 mg Oral BID    morphine  15 mg Oral 2 times per day    guaiFENesin  600 mg Oral BID    ipratropium-albuterol  1 ampule Inhalation Q4H WA    budesonide  0.5 mg Nebulization BID    formoterol  20 mcg Nebulization Q12H    piperacillin-tazobactam  3.375 g Intravenous Q8H    enoxaparin  30 mg Subcutaneous BID    pantoprazole  40 mg Oral QAM AC    nicotine  1 patch Transdermal Daily    sodium chloride flush  10 mL Intravenous 2 times per day    insulin lispro  0-6 Units Subcutaneous TID WC    insulin lispro  0-3 Units Subcutaneous Nightly    FLUoxetine  20 mg Oral Daily     PRN Medications: morphine, ALPRAZolam, sodium chloride flush, magnesium hydroxide, ondansetron, albuterol, glucose, dextrose, glucagon (rDNA), dextrose, acetaminophen  Continuous Infusions:   dextrose         Objective:   Vitals: BP (!) 140/74   Pulse 86   Temp 98.8 °F (37.1 °C) (Infrared)   Resp 12   Ht 6' 1\" (1.854 m)   Wt 220 lb 14.4 oz (100.2 kg)   SpO2 97%   BMI 29.14 kg/m²   General appearance - ill appearing, not in pain but has mild respiratory distress  Mental status - good mood, alert and oriented  Eyes - pupils equal and reactive, extraocular eye movements intact  Ears - bilateral TM's and external ear canals normal  Nose - normal and patent, no erythema, discharge or polyps  Mouth - mucous membranes moist, pharynx normal without lesions  Neck - supple, no significant adenopathy  Lymphatics - no palpable lymphadenopathy, no hepatosplenomegaly  Chest -decreased air entry with scattered rhonchi  Heart - normal rate, regular rhythm, normal S1, S2, no murmurs, rubs, clicks or gallops  Abdomen - soft, nontender, nondistended, no masses or organomegaly  Neurological - alert, oriented, normal speech, no focal findings or movement disorder noted  Musculoskeletal - no joint tenderness, deformity or swelling  Extremities - peripheral pulses normal, no pedal edema, no clubbing or cyanosis    Data:  No intake/output data recorded. In: 350 [I.V.:350]  Out: 500 [Urine:500]  CBC:   Recent Labs      01/23/18   0608  01/24/18   0430  01/25/18 0420   WBC  10.1  12.6*  14.2*   HGB  9.7*  9.3*  9.5*   PLT  67*  76*  75*     BMP:    Recent Labs      01/23/18 0608 01/24/18 0430  01/25/18   0420   NA  137  136  135   K  4.2  4.0  4.3   CL  93*  93*  91*   CO2  34*  34*  36*   BUN  24*  19  19   CREATININE  0.57*  0.55*  0.54*   GLUCOSE  167*  142*  126*     Hepatic:   No results for input(s): AST, ALT, ALB, BILITOT, ALKPHOS in the last 72 hours. THORACENTESIS FLUID RIGHT:       METASTATIC ADENOCARCINOMA (SEE MICROSCOPIC DESCRIPTION).         INR:   No results for input(s): INR in the last 72 hours. IMAGING DATA:  CT chest 1/17/18  Impression   1. No evidence for acute pulmonary embolism.  Some artifacts decrease   sensitivity the segmental and subsegmental branches. 2. Worsening left pleural, stable right pleural effusion. 3. Worsening discrete and confluent geographic appearing irregular   ground-glass attenuation particularly in the upper lobes right greater than   left along with a new band in the right lower lobe which may represent acute   pneumonitis although remains nonspecific. 4. Stable diffuse interlobular septal thickening.  Possibilities include   pulmonary edema versus lymphangitic carcinomatosis. 5. Diffuse osseous metastasis again demonstrated     Assessment    1. Diffuse stage IV metastatic adenocarcinoma on bone marrow biopsy with probable lung primary.  Now

## 2018-01-25 NOTE — TELEPHONE ENCOUNTER
Assigned to patient for Oncology Nurse Navigation. Noted patient remains inpatient with multiple disciplines following. Will continue to follow.  [address] and Navigation information mailed to home address

## 2018-01-25 NOTE — PROGRESS NOTES
DME as needed   Short term goal 5 verb good understanding of fall prevention techs, safety, and EC/WS techs for use during ADLs and mob   Assessment   Performance deficits / Impairments Decreased functional mobility ; Decreased ADL status; Decreased strength;Decreased safe awareness;Decreased endurance;Decreased balance   Prognosis Good   Patient Education OT POC, safety during transfers, pacing, coordinating breathing with activity   REQUIRES OT FOLLOW UP Yes   Discharge Recommendations Subacute/Skilled Nursing Facility   Activity Tolerance   Activity Tolerance Treatment limited secondary to medical complications (free text)   Activity Tolerance Limited due to pt on bipap and SOB   Safety Devices   Safety Devices in place Yes   Type of devices Nurse notified; Left in chair;Patient at risk for falls;Call light within reach; All fall risk precautions in place   Restraints   Initially in place No   Other Comments   Comments TIME:[id number]   Plan   Times per week 4-5x/week, 1-2x/day   Current Treatment Recommendations Strengthening;Balance Training;Functional Mobility Training; Endurance Training;Self-Care / ADL; Patient/Caregiver Education & Training; Safety Education & Training;Positioning   Upon arrival, pt supine in bed. Supine>sit EOB to don underwear. Stood to don over hips, stand step transfer to chair. Upon writer exit, call light within reach, pt retired to chair. All lines intact and patient positioned comfortably. All patient needs addressed prior to ending therapy session. Chart reviewed prior to treatment and patient is agreeable for therapy. RN reports patient is medically stable for therapy treatment this date. Patient would benefit from SNF for continued occupational therapy to increase independence with  ADL of bathing, dressing, toileting and grooming.  Writer recommending SNF placement for for activity tolerance and strength which will increase independence with ADL's coordinated with bed mobility and chair transfers. Continued skilled OT services to address decreased safety awareness with ADL and IADL tasks and for education and increased independence with DME and AE for fall prevention and ec/ws techniques prior to d/c home.   Jen OCHOA/ELDA

## 2018-01-25 NOTE — PROGRESS NOTES
hours  · DVT prophylaxis with low molecular weight heparin  · PT/OT  · Mucinex  · Nicotine patch  · Recommend discharge to LTAC once ready   · Will follow with you    Electronically signed by     Maximo Reyna MD on 1/25/2018 at 9:50 AM  Pulmonary Critical Care and Sleep Medicine,  [facility]  Cell: [phone]  Office: [phone]

## 2018-01-26 ENCOUNTER — APPOINTMENT (OUTPATIENT)
Dept: GENERAL RADIOLOGY | Age: 63
DRG: 193 | End: 2018-01-26
Payer: COMMERCIAL

## 2018-01-26 LAB
ABSOLUTE EOS #: 0 K/UL (ref 0–0.4)
ABSOLUTE IMMATURE GRANULOCYTE: ABNORMAL K/UL (ref 0–0.3)
ABSOLUTE LYMPH #: 2.51 K/UL (ref 1–4.8)
ABSOLUTE MONO #: 1 K/UL (ref 0.2–0.8)
ANION GAP SERPL CALCULATED.3IONS-SCNC: 6 MMOL/L (ref 9–17)
BASOPHILS # BLD: 0 %
BASOPHILS ABSOLUTE: 0 K/UL (ref 0–0.2)
BUN BLDV-MCNC: 20 MG/DL (ref 8–23)
BUN/CREAT BLD: 41 (ref 9–20)
CALCIUM SERPL-MCNC: 8.1 MG/DL (ref 8.6–10.4)
CHLORIDE BLD-SCNC: 95 MMOL/L (ref 98–107)
CO2: 37 MMOL/L (ref 20–31)
CREAT SERPL-MCNC: 0.49 MG/DL (ref 0.7–1.2)
DIFFERENTIAL TYPE: ABNORMAL
EOSINOPHILS RELATIVE PERCENT: 0 % (ref 1–4)
GFR AFRICAN AMERICAN: >60 ML/MIN
GFR NON-AFRICAN AMERICAN: >60 ML/MIN
GFR SERPL CREATININE-BSD FRML MDRD: ABNORMAL ML/MIN/{1.73_M2}
GFR SERPL CREATININE-BSD FRML MDRD: ABNORMAL ML/MIN/{1.73_M2}
GLUCOSE BLD-MCNC: 123 MG/DL (ref 70–99)
GLUCOSE BLD-MCNC: 159 MG/DL (ref 75–110)
GLUCOSE BLD-MCNC: 199 MG/DL (ref 75–110)
HCT VFR BLD CALC: 27.9 % (ref 41–53)
HEMOGLOBIN: 9.5 G/DL (ref 13.5–17.5)
IMMATURE GRANULOCYTES: ABNORMAL %
LYMPHOCYTES # BLD: 15 % (ref 24–44)
MCH RBC QN AUTO: 29 PG (ref 26–34)
MCHC RBC AUTO-ENTMCNC: 34.2 G/DL (ref 31–37)
MCV RBC AUTO: 84.6 FL (ref 80–100)
METAMYELOCYTES ABSOLUTE COUNT: 0.84 K/UL
METAMYELOCYTES: 5 %
MONOCYTES # BLD: 6 % (ref 1–7)
MORPHOLOGY: ABNORMAL
MYELOCYTES ABSOLUTE COUNT: 0.67 K/UL
MYELOCYTES: 4 %
NRBC AUTOMATED: ABNORMAL PER 100 WBC
NUCLEATED RED BLOOD CELLS: 5 PER 100 WBC
PDW BLD-RTO: 14.7 % (ref 11.5–14.5)
PLATELET # BLD: 85 K/UL (ref 130–400)
PLATELET ESTIMATE: ABNORMAL
PMV BLD AUTO: ABNORMAL FL (ref 6–12)
POTASSIUM SERPL-SCNC: 4.3 MMOL/L (ref 3.7–5.3)
RBC # BLD: 3.29 M/UL (ref 4.5–5.9)
RBC # BLD: ABNORMAL 10*6/UL
SEG NEUTROPHILS: 70 % (ref 36–66)
SEGMENTED NEUTROPHILS ABSOLUTE COUNT: 11.68 K/UL (ref 1.8–7.7)
SODIUM BLD-SCNC: 138 MMOL/L (ref 135–144)
WBC # BLD: 16.7 K/UL (ref 3.5–11)
WBC # BLD: ABNORMAL 10*3/UL

## 2018-01-26 PROCEDURE — 6370000000 HC RX 637 (ALT 250 FOR IP): Performed by: INTERNAL MEDICINE

## 2018-01-26 PROCEDURE — 6360000002 HC RX W HCPCS: Performed by: INTERNAL MEDICINE

## 2018-01-26 PROCEDURE — 36415 COLL VENOUS BLD VENIPUNCTURE: CPT

## 2018-01-26 PROCEDURE — 2580000003 HC RX 258: Performed by: INTERNAL MEDICINE

## 2018-01-26 PROCEDURE — 6360000002 HC RX W HCPCS: Performed by: NURSE PRACTITIONER

## 2018-01-26 PROCEDURE — 97530 THERAPEUTIC ACTIVITIES: CPT | Performed by: NURSE PRACTITIONER

## 2018-01-26 PROCEDURE — 99232 SBSQ HOSP IP/OBS MODERATE 35: CPT | Performed by: INTERNAL MEDICINE

## 2018-01-26 PROCEDURE — 94660 CPAP INITIATION&MGMT: CPT

## 2018-01-26 PROCEDURE — 94761 N-INVAS EAR/PLS OXIMETRY MLT: CPT

## 2018-01-26 PROCEDURE — 94640 AIRWAY INHALATION TREATMENT: CPT

## 2018-01-26 PROCEDURE — 80048 BASIC METABOLIC PNL TOTAL CA: CPT

## 2018-01-26 PROCEDURE — 2060000000 HC ICU INTERMEDIATE R&B

## 2018-01-26 PROCEDURE — 87205 SMEAR GRAM STAIN: CPT

## 2018-01-26 PROCEDURE — 2700000000 HC OXYGEN THERAPY PER DAY

## 2018-01-26 PROCEDURE — 87070 CULTURE OTHR SPECIMN AEROBIC: CPT

## 2018-01-26 PROCEDURE — 85025 COMPLETE CBC W/AUTO DIFF WBC: CPT

## 2018-01-26 PROCEDURE — 71045 X-RAY EXAM CHEST 1 VIEW: CPT

## 2018-01-26 PROCEDURE — 82947 ASSAY GLUCOSE BLOOD QUANT: CPT

## 2018-01-26 PROCEDURE — 97535 SELF CARE MNGMENT TRAINING: CPT | Performed by: NURSE PRACTITIONER

## 2018-01-26 RX ORDER — ALPRAZOLAM 0.25 MG/1
0.25 TABLET ORAL 3 TIMES DAILY PRN
Status: DISCONTINUED | OUTPATIENT
Start: 2018-01-26 | End: 2018-01-28

## 2018-01-26 RX ORDER — FUROSEMIDE 10 MG/ML
20 INJECTION INTRAMUSCULAR; INTRAVENOUS ONCE
Status: COMPLETED | OUTPATIENT
Start: 2018-01-26 | End: 2018-01-26

## 2018-01-26 RX ADMIN — FLUOXETINE 20 MG: 20 CAPSULE ORAL at 08:34

## 2018-01-26 RX ADMIN — GUAIFENESIN 600 MG: 600 TABLET, EXTENDED RELEASE ORAL at 20:32

## 2018-01-26 RX ADMIN — ENOXAPARIN SODIUM 30 MG: 30 INJECTION SUBCUTANEOUS at 08:34

## 2018-01-26 RX ADMIN — FUROSEMIDE 20 MG: 10 INJECTION, SOLUTION INTRAMUSCULAR; INTRAVENOUS at 14:03

## 2018-01-26 RX ADMIN — ALPRAZOLAM 0.25 MG: 0.25 TABLET ORAL at 07:55

## 2018-01-26 RX ADMIN — Medication 2 MG: at 20:32

## 2018-01-26 RX ADMIN — ALPRAZOLAM 0.25 MG: 0.25 TABLET ORAL at 20:32

## 2018-01-26 RX ADMIN — Medication 10 ML: at 07:55

## 2018-01-26 RX ADMIN — INSULIN LISPRO 1 UNITS: 100 INJECTION, SOLUTION INTRAVENOUS; SUBCUTANEOUS at 14:04

## 2018-01-26 RX ADMIN — ENOXAPARIN SODIUM 30 MG: 30 INJECTION SUBCUTANEOUS at 20:31

## 2018-01-26 RX ADMIN — FORMOTEROL FUMARATE DIHYDRATE 20 MCG: 20 SOLUTION RESPIRATORY (INHALATION) at 07:18

## 2018-01-26 RX ADMIN — BUDESONIDE 500 MCG: 0.5 SUSPENSION RESPIRATORY (INHALATION) at 07:18

## 2018-01-26 RX ADMIN — IPRATROPIUM BROMIDE AND ALBUTEROL SULFATE 1 AMPULE: .5; 3 SOLUTION RESPIRATORY (INHALATION) at 11:15

## 2018-01-26 RX ADMIN — Medication 2 MG: at 07:55

## 2018-01-26 RX ADMIN — GUAIFENESIN 600 MG: 600 TABLET, EXTENDED RELEASE ORAL at 08:34

## 2018-01-26 RX ADMIN — PANTOPRAZOLE SODIUM 40 MG: 40 TABLET, DELAYED RELEASE ORAL at 08:34

## 2018-01-26 RX ADMIN — Medication 2 MG: at 00:10

## 2018-01-26 RX ADMIN — Medication 2 MG: at 10:50

## 2018-01-26 RX ADMIN — IPRATROPIUM BROMIDE AND ALBUTEROL SULFATE 1 AMPULE: .5; 3 SOLUTION RESPIRATORY (INHALATION) at 07:18

## 2018-01-26 RX ADMIN — IPRATROPIUM BROMIDE AND ALBUTEROL SULFATE 1 AMPULE: .5; 3 SOLUTION RESPIRATORY (INHALATION) at 15:15

## 2018-01-26 RX ADMIN — INSULIN LISPRO 1 UNITS: 100 INJECTION, SOLUTION INTRAVENOUS; SUBCUTANEOUS at 20:31

## 2018-01-26 RX ADMIN — Medication 10 ML: at 21:00

## 2018-01-26 RX ADMIN — IPRATROPIUM BROMIDE AND ALBUTEROL SULFATE 1 AMPULE: .5; 3 SOLUTION RESPIRATORY (INHALATION) at 20:45

## 2018-01-26 RX ADMIN — MORPHINE SULFATE 15 MG: 15 TABLET, FILM COATED, EXTENDED RELEASE ORAL at 08:35

## 2018-01-26 RX ADMIN — MORPHINE SULFATE 15 MG: 15 TABLET, FILM COATED, EXTENDED RELEASE ORAL at 20:32

## 2018-01-26 RX ADMIN — Medication 2 MG: at 23:31

## 2018-01-26 RX ADMIN — PREDNISONE 20 MG: 20 TABLET ORAL at 08:34

## 2018-01-26 ASSESSMENT — PAIN SCALES - GENERAL
PAINLEVEL_OUTOF10: 10
PAINLEVEL_OUTOF10: 7
PAINLEVEL_OUTOF10: 6

## 2018-01-26 ASSESSMENT — PAIN DESCRIPTION - PAIN TYPE
TYPE: ACUTE PAIN
TYPE: ACUTE PAIN;CHRONIC PAIN
TYPE: ACUTE PAIN

## 2018-01-26 ASSESSMENT — PAIN DESCRIPTION - LOCATION
LOCATION: BACK;NECK
LOCATION: BACK
LOCATION: BACK

## 2018-01-26 ASSESSMENT — PAIN DESCRIPTION - ONSET
ONSET: GRADUAL
ONSET: GRADUAL

## 2018-01-26 ASSESSMENT — PAIN DESCRIPTION - DESCRIPTORS
DESCRIPTORS: ACHING;CONSTANT;DISCOMFORT
DESCRIPTORS: ACHING

## 2018-01-26 ASSESSMENT — PAIN DESCRIPTION - FREQUENCY
FREQUENCY: CONTINUOUS
FREQUENCY: CONTINUOUS

## 2018-01-26 ASSESSMENT — PAIN DESCRIPTION - PROGRESSION
CLINICAL_PROGRESSION: NOT CHANGED
CLINICAL_PROGRESSION: NOT CHANGED

## 2018-01-26 NOTE — PROGRESS NOTES
Follow-up COPD  Doing better less short of breath still using BiPAP no tachypnea no PND no orthopnea  Review of system  No fever no chills no GI/ complaints no cardiac complaints no TIA no bleeding, no polyuria polydipsia or hypoglycemia, no headache no sinus pain no sore throat no skin lesions  Vitals stable  Eyes mild pallor no jaundice  Neck supple no JVD  Neuro alert oriented ×3 with no focal deficit  Lungs air entry equal decreased at the bases few rhonchi  Heart regular rate and rhythm no gallop abdomen soft positive bowel sounds with no tenderness no rebound  Extremities good pulses without edema negative Homans sign  Assessment and plan  Acute on chronic respiratory failure with hypoxia using BiPAP periodically  COPD exacerbation continue with bronchodilators steroids off antibiotics  Pneumonia versus the lymphangitic spread of the tumor of antibiotics as of today  Prediabetes with hyperglycemia on insulin coverage  Normocytic anemia with thrombocytopenia  Metastatic adenocarcinoma likely from lung with malignant right pleural effusion  Meds labs reviewed continue DVT prophylaxis insulin coverage BiPAP and bronchodilators prognosis guarded await LTAC see orders

## 2018-01-26 NOTE — PROGRESS NOTES
oriented to person, place, and time. Skin: Skin is warm and dry. Laboratory data:   I have independently reviewed the following labs:  CBC with Differential:   Recent Labs      01/25/18   0420  01/26/18   0500   WBC  14.2*  16.7*   HGB  9.5*  9.5*   HCT  28.0*  27.9*   PLT  75*  85*   LYMPHOPCT  21*  15*   MONOPCT  17*  6     BMP:   Recent Labs      01/25/18   0420  01/26/18   0500   NA  135  138   K  4.3  4.3   CL  91*  95*   CO2  36*  37*   BUN  19  20   CREATININE  0.54*  0.49*     Hepatic Function Panel:   No results for input(s): PROT, LABALBU, BILIDIR, IBILI, BILITOT, ALKPHOS, ALT, AST in the last 72 hours. No results for input(s): VANCOTROUGH in the last 72 hours. No results found for: CRP  No results found for: SEDRATE     NONGYNECOLOGICAL CYTOPATHOLOGY CONSULTATION   -- Diagnosis --   THORACENTESIS FLUID RIGHT:         METASTATIC ADENOCARCINOMA (SEE MICROSCOPIC DESCRIPTION).         MICROSCOPIC DESCRIPTION   Clusters of malignant cells show intracytoplasmic mucin on mucicarmine stain and are positive for CEA, and negative for both CK 20 and TTF-1. Patient has known history of metastatic bone adenocarcinoma.  In a man, a CEA positive adenocarcinoma, metastatic to bone, is most commonly from lung primary.          Imaging Studies:   CXR - pending    Cultures:     DuyBlood #1 [[id number]] Collected: 01/17/18 1600   Order Status: Completed Specimen: Blood Updated: 01/23/18 0651    Specimen Description . BLOOD 8ML LT AC Performed at [address] [place] [address]    Specimen Description [place], [address] [facility] [phone]    Special Requests NOT REPORTED    Culture NO GROWTH 6 DAYS    Culture Performed at [facility] [address] [facility], [address] [place] [address] [phone]    Status FINAL 01/23/2018   DuyBlood #1 [[id number]] Collected: 01/17/18 8385   Order Status: Completed Specimen: Blood Updated: 01/23/18 0651    Specimen Description . BLOOD 10ML RTA Performed at [facility].

## 2018-01-26 NOTE — PLAN OF CARE
Problem: Nutrition  Goal: Optimal nutrition therapy  Nutrition Problem: Inadequate oral intake  Intervention: Food and/or Nutrient Delivery: Continue current diet, Start ONS  Nutritional Goals: PO intake > 75% of estimated kcal/protein needs with good GI tolerance/glycemic control     Writer to encourage patient to consume a greater percentage of each meal beginning with breakfast.    Writer encourages patient to take in fluids and educates to push himself to eat a little more to stimulate his appetite. Patient demonstrates today that he is taking in a little more food with all meals.

## 2018-01-26 NOTE — PROGRESS NOTES
organomegaly  Neurological - alert, oriented, normal speech, no focal findings or movement disorder noted  Musculoskeletal - no joint tenderness, deformity or swelling  Extremities - peripheral pulses normal, no pedal edema, no clubbing or cyanosis    Data:  No intake/output data recorded. In: 240 [P.O.:240]  Out: 250 [Urine:250]  CBC:   Recent Labs      01/24/18   0430  01/25/18   0420  01/26/18   0500   WBC  12.6*  14.2*  16.7*   HGB  9.3*  9.5*  9.5*   PLT  76*  75*  85*     BMP:    Recent Labs      01/24/18   0430  01/25/18   0420  01/26/18   0500   NA  136  135  138   K  4.0  4.3  4.3   CL  93*  91*  95*   CO2  34*  36*  37*   BUN  19  19  20   CREATININE  0.55*  0.54*  0.49*   GLUCOSE  142*  126*  123*     Hepatic:   No results for input(s): AST, ALT, ALB, BILITOT, ALKPHOS in the last 72 hours. THORACENTESIS FLUID RIGHT:       METASTATIC ADENOCARCINOMA (SEE MICROSCOPIC DESCRIPTION).         INR:   No results for input(s): INR in the last 72 hours. IMAGING DATA:  CT chest 1/17/18  Impression   1. No evidence for acute pulmonary embolism.  Some artifacts decrease   sensitivity the segmental and subsegmental branches. 2. Worsening left pleural, stable right pleural effusion. 3. Worsening discrete and confluent geographic appearing irregular   ground-glass attenuation particularly in the upper lobes right greater than   left along with a new band in the right lower lobe which may represent acute   pneumonitis although remains nonspecific. 4. Stable diffuse interlobular septal thickening.  Possibilities include   pulmonary edema versus lymphangitic carcinomatosis. 5. Diffuse osseous metastasis again demonstrated     Assessment    1. Diffuse stage IV metastatic adenocarcinoma on bone marrow biopsy with probable lung primary. Now pleural fluid cytology positive for adenocarcinoma  2. Diffuse bone metastasis  3. Chronic respiratory failure  4. COPD  5. Interstitial lung disease  6.  Possible pneumonia versus Lymphangitic Spread on CT Scan  7. Thrombocytopenia    Plan     1. Continue supportive management Abx And continue steroids  2. Pleural fluid cytology c/w adenoCa. I had long discussion with patient and his wife about the diagnosis. Oral picture consistent with lung adenocarcinoma with metastasis. Stage IV.b I  sent a request for EGFR, ALK, ROS1 and PDL 1 testing. 3. Patient will follow with Dr. Merlene Calderón as outpatient to initiate treatment  4. Patient would like to go to LTAC  5. I discussed the goals of care  6. Poor prognosis. 7. Thank you for allowing us to participate in the care of this pleasant patient.       Jozef Parson MD  Hematologist/Medical Oncologist    Cell: [phone]      This note is created with the assistance of a speech recognition program.  While intending to generate a document that actually reflects the content of the visit, the document can still have some errors including those of syntax and sound a like substitutions which may escape proof reading. It such instances, actual meaning can be extrapolated by contextual diversion.

## 2018-01-26 NOTE — CARE COORDINATION
Social work; spoke to Raul Quan from [facility] who has been working with [employer]. He was told insurance will call for clinicals (probably to [facility]) and will want to fax a form that Mikaela/ Raul Quan stated they complete in order to provide the treatment plan for LTAC they are looking for to ok LTAC. Alerted [facility] and phone for Raul Quan is [phone]. Call anytime through weekend.     Cort July [facility]

## 2018-01-26 NOTE — PROGRESS NOTES
Pulmonary Critical Care Progress Note  Luc Moran M.D. Patient seen for the follow up of Chronic respiratory failure, pneumonia, COPD exacerbation, Metastatic lung cancer    Subjective:  He continues to be short of breath with slight increase in work of breathing. He continues to have a congested frequent cough. He denies chest pain. He is tolerating orals. He feels anxious    Examination:  Vitals: /65   Pulse 92   Temp 98 °F (36.7 °C) (Infrared)   Resp 17   Ht 6' 1\" (1.854 m)   Wt 220 lb 14.4 oz (100.2 kg)   SpO2 96%   BMI 29.14 kg/m²     General appearance: alert and cooperative with exam  Neck: No JVD  Lungs: Decreased air exchange+ rhonchi, slight wheeze, increased work of breathing  Heart: regular rate and rhythm, S1, S2 normal, no gallop  Abdomen: Soft, non tender, + BS  Extremities: no cyanosis or clubbing.  No significant edema    LABs:  CBC:   Recent Labs      01/25/18   0420  01/26/18   0500   WBC  14.2*  16.7*   HGB  9.5*  9.5*   HCT  28.0*  27.9*   PLT  75*  85*     BMP:   Recent Labs      01/25/18   0420  01/26/18   0500   NA  135  138   K  4.3  4.3   CO2  36*  37*   BUN  19  20   CREATININE  0.54*  0.49*   LABGLOM  >60  >60   GLUCOSE  126*  123*     Radiology:  1/25/18      Impression:  · Acute on chronic respiratory failure  · Healthcare associated pneumonia  · Acute exacerbation of COPD  · Interstitial lung disease/lymphangitic spread of adenocarcinoma   · Mild pulmonary edema/Small bilateral pleural effusions/atelectasis  · Recent left-sided thoracentesis on 12/27/17, fluid negative for malignancy   · Right-sided thoracentesis on 1/19/18 showing malignant cells  · Stage IV adenocarcinoma most likely lung primary  · Smoking history    Recommendations:  · 2-3 liters/min via nasal cannula  · BiPAP as needed  · Continue IV Zosyn, infectious disease on consult  · Lasix ×1  · Increase Xanax to 3 times a day  · Check CBC and BMP in the morning  · Prednisone taper  · Albuterol and

## 2018-01-27 LAB
-: NORMAL
ABSOLUTE EOS #: 0.32 K/UL (ref 0–0.4)
ABSOLUTE IMMATURE GRANULOCYTE: ABNORMAL K/UL (ref 0–0.3)
ABSOLUTE LYMPH #: 4.93 K/UL (ref 1–4.8)
ABSOLUTE MONO #: 1.43 K/UL (ref 0.2–0.8)
ALBUMIN SERPL-MCNC: 3 G/DL (ref 3.5–5.2)
ALBUMIN/GLOBULIN RATIO: ABNORMAL (ref 1–2.5)
ALP BLD-CCNC: 662 U/L (ref 40–129)
ALT SERPL-CCNC: 35 U/L (ref 5–41)
AMORPHOUS: NORMAL
ANION GAP SERPL CALCULATED.3IONS-SCNC: 8 MMOL/L (ref 9–17)
AST SERPL-CCNC: 33 U/L
BACTERIA: NORMAL
BASOPHILS # BLD: 0 %
BASOPHILS ABSOLUTE: 0 K/UL (ref 0–0.2)
BILIRUB SERPL-MCNC: 0.52 MG/DL (ref 0.3–1.2)
BILIRUBIN DIRECT: 0.14 MG/DL
BILIRUBIN URINE: NEGATIVE
BILIRUBIN, INDIRECT: 0.38 MG/DL (ref 0–1)
BUN BLDV-MCNC: 21 MG/DL (ref 8–23)
BUN/CREAT BLD: 44 (ref 9–20)
CALCIUM SERPL-MCNC: 8.5 MG/DL (ref 8.6–10.4)
CASTS UA: NORMAL /LPF
CHLORIDE BLD-SCNC: 92 MMOL/L (ref 98–107)
CO2: 37 MMOL/L (ref 20–31)
COLOR: YELLOW
COMMENT UA: ABNORMAL
CREAT SERPL-MCNC: 0.48 MG/DL (ref 0.7–1.2)
CRYSTALS, UA: NORMAL /HPF
CULTURE: ABNORMAL
DIFFERENTIAL TYPE: ABNORMAL
DIRECT EXAM: ABNORMAL
EOSINOPHILS RELATIVE PERCENT: 2 % (ref 1–4)
EPITHELIAL CELLS UA: NORMAL /HPF (ref 0–5)
GFR AFRICAN AMERICAN: >60 ML/MIN
GFR NON-AFRICAN AMERICAN: >60 ML/MIN
GFR SERPL CREATININE-BSD FRML MDRD: ABNORMAL ML/MIN/{1.73_M2}
GFR SERPL CREATININE-BSD FRML MDRD: ABNORMAL ML/MIN/{1.73_M2}
GLOBULIN: ABNORMAL G/DL (ref 1.5–3.8)
GLUCOSE BLD-MCNC: 132 MG/DL (ref 75–110)
GLUCOSE BLD-MCNC: 143 MG/DL (ref 70–99)
GLUCOSE BLD-MCNC: 148 MG/DL (ref 75–110)
GLUCOSE BLD-MCNC: 181 MG/DL (ref 75–110)
GLUCOSE URINE: NEGATIVE
HCT VFR BLD CALC: 27 % (ref 41–53)
HEMOGLOBIN: 9.2 G/DL (ref 13.5–17.5)
IMMATURE GRANULOCYTES: ABNORMAL %
KETONES, URINE: NEGATIVE
LEUKOCYTE ESTERASE, URINE: NEGATIVE
LYMPHOCYTES # BLD: 31 % (ref 24–44)
Lab: ABNORMAL
MCH RBC QN AUTO: 28.5 PG (ref 26–34)
MCHC RBC AUTO-ENTMCNC: 34.2 G/DL (ref 31–37)
MCV RBC AUTO: 83.4 FL (ref 80–100)
METAMYELOCYTES ABSOLUTE COUNT: 0.16 K/UL
METAMYELOCYTES: 1 %
MONOCYTES # BLD: 9 % (ref 1–7)
MORPHOLOGY: ABNORMAL
MUCUS: NORMAL
NITRITE, URINE: NEGATIVE
NRBC AUTOMATED: ABNORMAL PER 100 WBC
OTHER OBSERVATIONS UA: NORMAL
PDW BLD-RTO: 15.2 % (ref 11.5–14.5)
PH UA: 6 (ref 5–8)
PLATELET # BLD: 70 K/UL (ref 130–400)
PLATELET ESTIMATE: ABNORMAL
PMV BLD AUTO: ABNORMAL FL (ref 6–12)
POTASSIUM SERPL-SCNC: 4.1 MMOL/L (ref 3.7–5.3)
PROTEIN UA: ABNORMAL
RBC # BLD: 3.24 M/UL (ref 4.5–5.9)
RBC # BLD: ABNORMAL 10*6/UL
RBC UA: NORMAL /HPF (ref 0–2)
RENAL EPITHELIAL, UA: NORMAL /HPF
SEG NEUTROPHILS: 57 % (ref 36–66)
SEGMENTED NEUTROPHILS ABSOLUTE COUNT: 9.06 K/UL (ref 1.8–7.7)
SODIUM BLD-SCNC: 137 MMOL/L (ref 135–144)
SPECIFIC GRAVITY UA: 1.02 (ref 1–1.03)
SPECIMEN DESCRIPTION: ABNORMAL
STATUS: ABNORMAL
TOTAL PROTEIN: 5.9 G/DL (ref 6.4–8.3)
TRICHOMONAS: NORMAL
TURBIDITY: CLEAR
URINE HGB: NEGATIVE
UROBILINOGEN, URINE: NORMAL
WBC # BLD: 15.9 K/UL (ref 3.5–11)
WBC # BLD: ABNORMAL 10*3/UL
WBC UA: NORMAL /HPF (ref 0–5)
YEAST: NORMAL

## 2018-01-27 PROCEDURE — 2580000003 HC RX 258: Performed by: INTERNAL MEDICINE

## 2018-01-27 PROCEDURE — 82947 ASSAY GLUCOSE BLOOD QUANT: CPT

## 2018-01-27 PROCEDURE — 6370000000 HC RX 637 (ALT 250 FOR IP): Performed by: INTERNAL MEDICINE

## 2018-01-27 PROCEDURE — 36415 COLL VENOUS BLD VENIPUNCTURE: CPT

## 2018-01-27 PROCEDURE — 94640 AIRWAY INHALATION TREATMENT: CPT

## 2018-01-27 PROCEDURE — 6360000002 HC RX W HCPCS: Performed by: INTERNAL MEDICINE

## 2018-01-27 PROCEDURE — 2060000000 HC ICU INTERMEDIATE R&B

## 2018-01-27 PROCEDURE — 85025 COMPLETE CBC W/AUTO DIFF WBC: CPT

## 2018-01-27 PROCEDURE — 97530 THERAPEUTIC ACTIVITIES: CPT

## 2018-01-27 PROCEDURE — 2700000000 HC OXYGEN THERAPY PER DAY

## 2018-01-27 PROCEDURE — 80048 BASIC METABOLIC PNL TOTAL CA: CPT

## 2018-01-27 PROCEDURE — 94761 N-INVAS EAR/PLS OXIMETRY MLT: CPT

## 2018-01-27 PROCEDURE — 51702 INSERT TEMP BLADDER CATH: CPT

## 2018-01-27 PROCEDURE — 97112 NEUROMUSCULAR REEDUCATION: CPT

## 2018-01-27 PROCEDURE — 94660 CPAP INITIATION&MGMT: CPT

## 2018-01-27 PROCEDURE — 81001 URINALYSIS AUTO W/SCOPE: CPT

## 2018-01-27 PROCEDURE — 80076 HEPATIC FUNCTION PANEL: CPT

## 2018-01-27 PROCEDURE — 99233 SBSQ HOSP IP/OBS HIGH 50: CPT | Performed by: INTERNAL MEDICINE

## 2018-01-27 PROCEDURE — 97110 THERAPEUTIC EXERCISES: CPT

## 2018-01-27 RX ADMIN — ALPRAZOLAM 0.25 MG: 0.25 TABLET ORAL at 10:06

## 2018-01-27 RX ADMIN — FLUOXETINE 20 MG: 20 CAPSULE ORAL at 10:00

## 2018-01-27 RX ADMIN — GUAIFENESIN 600 MG: 600 TABLET, EXTENDED RELEASE ORAL at 10:01

## 2018-01-27 RX ADMIN — IPRATROPIUM BROMIDE AND ALBUTEROL SULFATE 1 AMPULE: .5; 3 SOLUTION RESPIRATORY (INHALATION) at 05:41

## 2018-01-27 RX ADMIN — Medication 2 MG: at 03:48

## 2018-01-27 RX ADMIN — Medication 2 MG: at 18:17

## 2018-01-27 RX ADMIN — Medication 2 MG: at 15:56

## 2018-01-27 RX ADMIN — MORPHINE SULFATE 15 MG: 15 TABLET, FILM COATED, EXTENDED RELEASE ORAL at 10:06

## 2018-01-27 RX ADMIN — IPRATROPIUM BROMIDE AND ALBUTEROL SULFATE 1 AMPULE: .5; 3 SOLUTION RESPIRATORY (INHALATION) at 11:11

## 2018-01-27 RX ADMIN — ALPRAZOLAM 0.25 MG: 0.25 TABLET ORAL at 20:47

## 2018-01-27 RX ADMIN — PANTOPRAZOLE SODIUM 40 MG: 40 TABLET, DELAYED RELEASE ORAL at 09:59

## 2018-01-27 RX ADMIN — ENOXAPARIN SODIUM 30 MG: 30 INJECTION SUBCUTANEOUS at 09:58

## 2018-01-27 RX ADMIN — ALPRAZOLAM 0.25 MG: 0.25 TABLET ORAL at 15:57

## 2018-01-27 RX ADMIN — Medication 10 ML: at 20:49

## 2018-01-27 RX ADMIN — ALPRAZOLAM 0.25 MG: 0.25 TABLET ORAL at 03:48

## 2018-01-27 RX ADMIN — Medication 10 ML: at 18:18

## 2018-01-27 RX ADMIN — INSULIN LISPRO 1 UNITS: 100 INJECTION, SOLUTION INTRAVENOUS; SUBCUTANEOUS at 09:52

## 2018-01-27 RX ADMIN — IPRATROPIUM BROMIDE AND ALBUTEROL SULFATE 1 AMPULE: .5; 3 SOLUTION RESPIRATORY (INHALATION) at 19:34

## 2018-01-27 RX ADMIN — Medication 10 ML: at 10:19

## 2018-01-27 RX ADMIN — ENOXAPARIN SODIUM 30 MG: 30 INJECTION SUBCUTANEOUS at 20:51

## 2018-01-27 RX ADMIN — Medication 2 MG: at 06:04

## 2018-01-27 RX ADMIN — INSULIN LISPRO 1 UNITS: 100 INJECTION, SOLUTION INTRAVENOUS; SUBCUTANEOUS at 12:18

## 2018-01-27 RX ADMIN — IPRATROPIUM BROMIDE AND ALBUTEROL SULFATE 1 AMPULE: .5; 3 SOLUTION RESPIRATORY (INHALATION) at 15:17

## 2018-01-27 RX ADMIN — MORPHINE SULFATE 15 MG: 15 TABLET, FILM COATED, EXTENDED RELEASE ORAL at 20:46

## 2018-01-27 RX ADMIN — INSULIN LISPRO 1 UNITS: 100 INJECTION, SOLUTION INTRAVENOUS; SUBCUTANEOUS at 17:35

## 2018-01-27 RX ADMIN — Medication 10 ML: at 12:19

## 2018-01-27 RX ADMIN — Medication 2 MG: at 12:18

## 2018-01-27 RX ADMIN — GUAIFENESIN 600 MG: 600 TABLET, EXTENDED RELEASE ORAL at 20:48

## 2018-01-27 RX ADMIN — PREDNISONE 30 MG: 20 TABLET ORAL at 10:00

## 2018-01-27 ASSESSMENT — PAIN DESCRIPTION - PROGRESSION
CLINICAL_PROGRESSION: NOT CHANGED

## 2018-01-27 ASSESSMENT — PAIN SCALES - GENERAL
PAINLEVEL_OUTOF10: 8
PAINLEVEL_OUTOF10: 3
PAINLEVEL_OUTOF10: 9
PAINLEVEL_OUTOF10: 8
PAINLEVEL_OUTOF10: 3
PAINLEVEL_OUTOF10: 9
PAINLEVEL_OUTOF10: 3

## 2018-01-27 ASSESSMENT — PAIN DESCRIPTION - FREQUENCY: FREQUENCY: CONTINUOUS

## 2018-01-27 ASSESSMENT — PAIN DESCRIPTION - PAIN TYPE: TYPE: ACUTE PAIN

## 2018-01-27 ASSESSMENT — PAIN DESCRIPTION - LOCATION: LOCATION: BACK;NECK

## 2018-01-27 ASSESSMENT — PAIN DESCRIPTION - ONSET: ONSET: ON-GOING

## 2018-01-27 ASSESSMENT — PAIN DESCRIPTION - DESCRIPTORS: DESCRIPTORS: CONSTANT;DISCOMFORT

## 2018-01-27 NOTE — PROGRESS NOTES
[facility]  Progress Note    Vinicius Lino  MRN: [id number]  Date: 1/27/2018    Reason for F/U :   Pneumonia    Impression :   · Worsening Acute hypoxic respiratory failure on chronic respiratory failure secondary to Interstitial lung disease, and bilateral pleural effusion  ? Pneumonia versus lymphangitic spread of malignancy  · Metastatic stage IV adenocarcinoma with diffuse bony metastases   ? Unknown primarypresumed lung  · Bilateral pleural effusions  ? Status post right-sided thoracentesis and has coagulase negative staph isolated likely a contaminant  ? Fluid cytology is positive for adenocarcinoma  · Underlying COPD   · Elevated alkaline phosphatase likely the bony component  · Constipation    Recommendations:   · The patient has completed a course of antimicrobials and today is day 2 off antibiotics. · ID wise he is doing well. There is a slight increase in WBC, but oscillations within same range. However, the overall picture shows progressive decline. Patient was able to walk to BR with walker, then could only stand up to urinate; currently can only sit at bedside because of of weakness and SOB. · He continues on supplemental O2 and intermittent BiPAP  · Covarrubias insertion if necessary. Infection Control Recommendations:   Universal precautions    Discharge Planning:   Once insurance approval is obtained for LTAC    Summary of Stay:   INITIAL HISTORY:    Vinicius Lino is a 60y.o.-year-old male admitted on 1/17/2018. He had been recently hospitalized and treated for pneumonia/COPD exacerbation with IV antibiotics and steroids. A CT of the chest done during that stay showed bony metastasis and possible lung metastasis. A left-sided thoracentesis fluid was negative for malignancy. He was discharged on antibiotics and tapering steroids.   Outpatient PET CT scan confirmed bony metastasis but no primary cancer was identified; a bone marrow biopsy showed adenocarcinoma.      He has been getting progressive shortness of breath, cough, as well as back pain - which prompted this admission. He was admitted started on antimicrobial therapy with ceftriaxone and azithromycin for pneumonia. A right sided thoracentesis done and they were gram-positive cocci isolated in the fluid. Vancomycin had been added by the primary team.   This turned out to be coagulase-negative staph and the vancomycin was stopped. He was switched to Zosyn for a potential healthcare associated pneumonia. The patient has had persistent shortness of breath and ended up being transferred into the intensive care unit where he has been on BiPAP intermittently. The right-sided thoracentesis fluid did have adenocarcinoma and the diagnosis at this point in time is prostatic adenocarcinoma of primary lung origin    The patient has completed a 3 day course of azithromycin and ceftriaxone, then followed by a 5 day course of Zosyn and he has been off antimicrobial therapy since 18    CURRENT EVALUATION [de-identified]2018    /80   Pulse 102   Temp 98.2 °F (36.8 °C) (Infrared)   Resp 17   Ht 6' 1\" (1.854 m)   Wt 220 lb 14.4 oz (100.2 kg)   SpO2 98%   BMI 29.14 kg/m²     Temperature Range: Temp: 98.2 °F (36.8 °C) Temp  Av.6 °F (37 °C)  Min: 98.2 °F (36.8 °C)  Max: 98.8 °F (37.1 °C)     Afebrile  VS stable    The patient was seen examined and evaluated at the bedside. He is very anxious. On BIPAP, remains tachypneic. Unable to stand up to urinate. Lungs are distant, clear. He is not moving much air. Denies cough or sputum production. No new culture data. Patient is stable ID wise after discontinuation of antibiotics, but underlying pulmonary condition seems to be progressively getting worse. Discussed with patient, RN. Physical Examination :     Physical Exam   Constitutional: He is oriented to person, place, and time. HENT:   Head: Normocephalic and atraumatic.    Nose: Nose normal.   Mouth/Throat: Oropharynx is clear and moist.   Eyes: Pupils are equal, round, and reactive to light. Neck: Normal range of motion. Cardiovascular: Regular rhythm and normal heart sounds. Exam reveals no friction rub. No murmur heard. Pulmonary/Chest: Effort normal. He has rales (Few scattered intermittent rales). Diminished breath sounds at the bases   Abdominal: Soft. Bowel sounds are normal. He exhibits distension. He exhibits no mass. There is no tenderness. Musculoskeletal: Normal range of motion. Neurological: He is alert and oriented to person, place, and time. Skin: Skin is warm and dry. Laboratory data:   I have independently reviewed the following labs:  CBC with Differential:   Recent Labs      01/26/18   0500  01/27/18   0511   WBC  16.7*  15.9*   HGB  9.5*  9.2*   HCT  27.9*  27.0*   PLT  85*  70*   LYMPHOPCT  15*  31   MONOPCT  6  9*     BMP:   Recent Labs      01/26/18   0500  01/27/18   0511   NA  138  137   K  4.3  4.1   CL  95*  92*   CO2  37*  37*   BUN  20  21   CREATININE  0.49*  0.48*     Hepatic Function Panel:   No results for input(s): PROT, LABALBU, BILIDIR, IBILI, BILITOT, ALKPHOS, ALT, AST in the last 72 hours. No results for input(s): VANCOTROUGH in the last 72 hours. No results found for: CRP  No results found for: SEDRATE     NONGYNECOLOGICAL CYTOPATHOLOGY CONSULTATION   -- Diagnosis --   THORACENTESIS FLUID RIGHT:         METASTATIC ADENOCARCINOMA (SEE MICROSCOPIC DESCRIPTION).         MICROSCOPIC DESCRIPTION   Clusters of malignant cells show intracytoplasmic mucin on mucicarmine stain and are positive for CEA, and negative for both CK 20 and TTF-1.    Patient has known history of metastatic bone adenocarcinoma.  In a man, a CEA positive adenocarcinoma, metastatic to bone, is most commonly from lung primary.          Imaging Studies:   CXR - pending    Cultures:     Cult,Blood #1 [[id number]] Collected: 01/17/18 1600   Order Status: Completed Specimen: Blood Updated: questions. I have examined the patient, reviewed the patient's medical history in detail and updated the computerized patient record. Above exam and data confirmed. Hakan Beth MD      Electronically signed by Pipe Courtney MD on 1/27/2018 at 9:27 AM    This note is created with the assistance of a speech recognition program.  While intending to generate a document that actually reflects the content of the visit, the document can still have some errors including those of syntax and sound a like substitutions which may escape proof reading. It such instances, actual meaning can be extrapolated by contextual diversion.

## 2018-01-27 NOTE — PLAN OF CARE
Problem: Pain:  Goal: Control of chronic pain  Control of chronic pain   Outcome: Ongoing  Pain management in place as ordered. Pt educated on PRN vs. Scheduled medication management. Pt encouraged to be active in pain management plan. Pt repositioned for comfort. Writer will continue to monitor. Problem: Falls - Risk of  Goal: Absence of falls  Outcome: Ongoing  Pt will remain free of falls immediately during shift. Bed in low locked position, call light within reach, bedside table within reach, side rails raised. Pt able to demonstrate proper safety awareness during shift. Pt instructed to call out for assistance. Writer will continue to monitor. Problem: Nutrition  Goal: Optimal nutrition therapy  Nutrition Problem: Inadequate oral intake  Intervention: Food and/or Nutrient Delivery: Continue current diet, Start ONS  Nutritional Goals: PO intake > 75% of estimated kcal/protein needs with good GI tolerance/glycemic control     Outcome: Ongoing  Pt encouraged to drink PO fluids. Pt states he \"gets full quick\" and can only eat \"a little bit\" at a time. Pt encouraged to choose food that he likes, and eat small frequent meals. Oral intake encouraged.

## 2018-01-27 NOTE — PROGRESS NOTES
Follow-up COPD  Still using BiPAP periodically less shortness of breath less cough no PND no orthopnea  Review of system  No fever no chills no GI/ complaints, no cardiac complaints, no TIA no bleeding, no headache, no sinus pain no sore throat no skin lesions no polyuria no polydipsia no hypoglycemia  Vitals stable patient slightly tachycardic  Eyes mild pallor no jaundice  Neck supple no JVD  Lungs air entry equal decreased at the bases few rhonchi  Heart regular rate and rhythm no gallop  Abdomen soft plus bowel sounds without any tenderness without any megaly  Extremities good pulses without edema negative Homans sign  Neuro alert oriented ×3 with no focal deficit  Assessment and plan  Acute respiratory failure on top of chronic with hypoxia  COPD exacerbation  Metastatic adenocarcinoma unknown primary likely lung  Malignant right pleural effusion  Normocytic anemia  Thrombocytopenia  Chronic smoker smoking cessation advised  Meds labs reviewed will continue with Lovenox as long as the platelet count above 50,000 continue BiPAP breathing treatments PT and OT await placement see orders start decreasing the steroids

## 2018-01-27 NOTE — PROGRESS NOTES
Physical Therapy  Facility/Department: [facility]  Daily Treatment Note  NAME: Zoya Kirkland  : 1955  MRN: [id number]    Date of Service: 2018    Patient Diagnosis(es):   Patient Active Problem List    Diagnosis Date Noted    Goals of care, counseling/discussion     Chronic respiratory failure with hypoxia ([facility] 2018    Coagulase-negative staphylococcal infection     Prediabetes 2018    Pleural effusion     Adenocarcinoma of unknown origin ([facility]     [facility]     COPD exacerbation ([facility] 2018    Elevated alkaline phosphatase level 2017    Pneumonia of both lungs due to infectious organism 2017       Past Medical History:   Diagnosis Date    Chronic fatigue     [facility]     Depression     Metastatic adenocarcinoma to bone marrow (HCC)      Past Surgical History:   Procedure Laterality Date    APPENDECTOMY         Restrictions  Restrictions/Precautions  Restrictions/Precautions: General Precautions, Fall Risk  Required Braces or Orthoses?: No  Position Activity Restriction  Other position/activity restrictions: Up with assist, monitor 02 and HR  Subjective   General  Chart Reviewed: Yes  Response To Previous Treatment: Patient with no complaints from previous session. Family / Caregiver Present: Yes  Subjective  Subjective: pt states he is very SOB today. Pain Screening  Patient Currently in Pain: Yes  Pain Assessment  Pain Assessment: 0-10  Pain Level: 3  Vital Signs  Patient Currently in Pain: Yes  Patient Observation  Observations: pt demo's anxiety prior to movement       Orientation     Objective   Bed mobility  Scooting: Stand by assistance  Transfers  Comment: pt required several attempts, however was not able to complete transfer. pt stated he was too SOB.  Attetmpt was made with BiPap on, pt was still unable to complete transfer  Ambulation  Ambulation?: No  Stairs/Curb  Stairs?: No

## 2018-01-27 NOTE — PROGRESS NOTES
morning  · Prednisone taper  · Albuterol and Atrovent aerosol Q 4 hours and prn  · Pulmicort and Perforomist aerosols every 12 hours  · DVT prophylaxis with low molecular weight heparin  · PT/OT  · Mucinex  · Place Covarrubias catheter  · Nicotine patch  · Recommend discharge to LTAC once ready   · Clinically patient is getting worse in spite of a regularly treating COPD and pneumonia. Consider comfort care. Family present in the room and I discussed with them.   · Will follow with you    Electronically signed by     Bert Cruz MD on 1/27/2018 at 1:34 PM  Pulmonary Critical Care and Sleep Medicine,  [facility]  Cell: [phone]  Office: [phone]

## 2018-01-28 ENCOUNTER — HOSPITAL ENCOUNTER (INPATIENT)
Age: 63
LOS: 1 days | Discharge: HOSPICE | DRG: 542 | End: 2018-01-29
Attending: INTERNAL MEDICINE | Admitting: INTERNAL MEDICINE
Payer: COMMERCIAL

## 2018-01-28 VITALS
WEIGHT: 220.9 LBS | HEIGHT: 73 IN | OXYGEN SATURATION: 95 % | RESPIRATION RATE: 14 BRPM | TEMPERATURE: 97.7 F | SYSTOLIC BLOOD PRESSURE: 125 MMHG | DIASTOLIC BLOOD PRESSURE: 47 MMHG | BODY MASS INDEX: 29.28 KG/M2 | HEART RATE: 100 BPM

## 2018-01-28 PROBLEM — D64.9 NORMOCYTIC ANEMIA: Status: ACTIVE | Noted: 2018-01-28

## 2018-01-28 PROBLEM — J96.21 ACUTE ON CHRONIC RESPIRATORY FAILURE WITH HYPOXIA (HCC): Status: ACTIVE | Noted: 2018-01-28

## 2018-01-28 PROBLEM — D69.6 THROMBOCYTOPENIA (HCC): Status: ACTIVE | Noted: 2018-01-28

## 2018-01-28 LAB
ABSOLUTE BANDS #: 2.37 K/UL
ABSOLUTE EOS #: 0.16 K/UL (ref 0–0.4)
ABSOLUTE IMMATURE GRANULOCYTE: ABNORMAL K/UL (ref 0–0.3)
ABSOLUTE LYMPH #: 2.69 K/UL (ref 1–4.8)
ABSOLUTE MONO #: 1.74 K/UL (ref 0.2–0.8)
ANION GAP SERPL CALCULATED.3IONS-SCNC: 6 MMOL/L (ref 9–17)
BANDS: 15 % (ref 1–15)
BASOPHILS # BLD: 0 %
BASOPHILS ABSOLUTE: 0 K/UL (ref 0–0.2)
BUN BLDV-MCNC: 17 MG/DL (ref 8–23)
BUN/CREAT BLD: 39 (ref 9–20)
CALCIUM SERPL-MCNC: 8.5 MG/DL (ref 8.6–10.4)
CHLORIDE BLD-SCNC: 95 MMOL/L (ref 98–107)
CO2: 38 MMOL/L (ref 20–31)
CREAT SERPL-MCNC: 0.44 MG/DL (ref 0.7–1.2)
DIFFERENTIAL TYPE: ABNORMAL
EOSINOPHILS RELATIVE PERCENT: 1 % (ref 1–4)
GFR AFRICAN AMERICAN: >60 ML/MIN
GFR NON-AFRICAN AMERICAN: >60 ML/MIN
GFR SERPL CREATININE-BSD FRML MDRD: ABNORMAL ML/MIN/{1.73_M2}
GFR SERPL CREATININE-BSD FRML MDRD: ABNORMAL ML/MIN/{1.73_M2}
GLUCOSE BLD-MCNC: 127 MG/DL (ref 75–110)
GLUCOSE BLD-MCNC: 132 MG/DL (ref 70–99)
GLUCOSE BLD-MCNC: 188 MG/DL (ref 75–110)
HCT VFR BLD CALC: 26 % (ref 41–53)
HEMOGLOBIN: 8.8 G/DL (ref 13.5–17.5)
IMMATURE GRANULOCYTES: ABNORMAL %
LYMPHOCYTES # BLD: 17 % (ref 24–44)
MCH RBC QN AUTO: 28.7 PG (ref 26–34)
MCHC RBC AUTO-ENTMCNC: 33.9 G/DL (ref 31–37)
MCV RBC AUTO: 84.9 FL (ref 80–100)
METAMYELOCYTES ABSOLUTE COUNT: 0.47 K/UL
METAMYELOCYTES: 3 %
MONOCYTES # BLD: 11 % (ref 1–7)
MORPHOLOGY: ABNORMAL
NRBC AUTOMATED: ABNORMAL PER 100 WBC
NUCLEATED RED BLOOD CELLS: 3 PER 100 WBC
PDW BLD-RTO: 16 % (ref 11.5–14.5)
PLATELET # BLD: 59 K/UL (ref 130–400)
PLATELET ESTIMATE: ABNORMAL
PMV BLD AUTO: 7.6 FL (ref 6–12)
POTASSIUM SERPL-SCNC: 4.3 MMOL/L (ref 3.7–5.3)
RBC # BLD: 3.06 M/UL (ref 4.5–5.9)
RBC # BLD: ABNORMAL 10*6/UL
SEG NEUTROPHILS: 53 % (ref 36–66)
SEGMENTED NEUTROPHILS ABSOLUTE COUNT: 8.37 K/UL (ref 1.8–7.7)
SODIUM BLD-SCNC: 139 MMOL/L (ref 135–144)
WBC # BLD: 15.8 K/UL (ref 3.5–11)
WBC # BLD: ABNORMAL 10*3/UL

## 2018-01-28 PROCEDURE — 94640 AIRWAY INHALATION TREATMENT: CPT

## 2018-01-28 PROCEDURE — 99233 SBSQ HOSP IP/OBS HIGH 50: CPT | Performed by: INTERNAL MEDICINE

## 2018-01-28 PROCEDURE — 6360000002 HC RX W HCPCS

## 2018-01-28 PROCEDURE — 6370000000 HC RX 637 (ALT 250 FOR IP): Performed by: INTERNAL MEDICINE

## 2018-01-28 PROCEDURE — 80048 BASIC METABOLIC PNL TOTAL CA: CPT

## 2018-01-28 PROCEDURE — 6360000002 HC RX W HCPCS: Performed by: INTERNAL MEDICINE

## 2018-01-28 PROCEDURE — 85025 COMPLETE CBC W/AUTO DIFF WBC: CPT

## 2018-01-28 PROCEDURE — 2580000003 HC RX 258: Performed by: INTERNAL MEDICINE

## 2018-01-28 PROCEDURE — 36415 COLL VENOUS BLD VENIPUNCTURE: CPT

## 2018-01-28 PROCEDURE — 1250000000 HC SEMI PRIVATE HOSPICE R&B

## 2018-01-28 PROCEDURE — 82947 ASSAY GLUCOSE BLOOD QUANT: CPT

## 2018-01-28 RX ORDER — PREDNISONE 10 MG/1
10 TABLET ORAL DAILY
Qty: 4 TABLET | Refills: 0
Start: 2018-01-28 | End: 2018-02-01

## 2018-01-28 RX ORDER — LORAZEPAM 0.5 MG/1
0.5 TABLET ORAL EVERY 4 HOURS PRN
Status: DISCONTINUED | OUTPATIENT
Start: 2018-01-28 | End: 2018-01-28

## 2018-01-28 RX ORDER — GLYCOPYRROLATE 0.2 MG/ML
0.2 INJECTION INTRAMUSCULAR; INTRAVENOUS EVERY 4 HOURS PRN
Status: DISCONTINUED | OUTPATIENT
Start: 2018-01-28 | End: 2018-01-28 | Stop reason: HOSPADM

## 2018-01-28 RX ORDER — NICOTINE 21 MG/24HR
1 PATCH, TRANSDERMAL 24 HOURS TRANSDERMAL DAILY
Status: DISCONTINUED | OUTPATIENT
Start: 2018-01-28 | End: 2018-01-29 | Stop reason: HOSPADM

## 2018-01-28 RX ORDER — MORPHINE SULFATE 4 MG/ML
4 INJECTION, SOLUTION INTRAMUSCULAR; INTRAVENOUS EVERY 4 HOURS PRN
Status: DISCONTINUED | OUTPATIENT
Start: 2018-01-28 | End: 2018-01-28

## 2018-01-28 RX ORDER — MORPHINE SULFATE 2 MG/ML
2 INJECTION, SOLUTION INTRAMUSCULAR; INTRAVENOUS
Status: DISCONTINUED | OUTPATIENT
Start: 2018-01-28 | End: 2018-01-29 | Stop reason: HOSPADM

## 2018-01-28 RX ORDER — BISACODYL 10 MG
10 SUPPOSITORY, RECTAL RECTAL DAILY PRN
Status: DISCONTINUED | OUTPATIENT
Start: 2018-01-28 | End: 2018-01-28 | Stop reason: HOSPADM

## 2018-01-28 RX ORDER — IPRATROPIUM BROMIDE AND ALBUTEROL SULFATE 2.5; .5 MG/3ML; MG/3ML
1 SOLUTION RESPIRATORY (INHALATION)
Status: DISCONTINUED | OUTPATIENT
Start: 2018-01-28 | End: 2018-01-29 | Stop reason: HOSPADM

## 2018-01-28 RX ORDER — PREDNISONE 10 MG/1
10 TABLET ORAL
Qty: 4 TABLET | Refills: 0
Start: 2018-01-28 | End: 2018-02-04

## 2018-01-28 RX ORDER — SODIUM CHLORIDE 0.9 % (FLUSH) 0.9 %
10 SYRINGE (ML) INJECTION PRN
Status: DISCONTINUED | OUTPATIENT
Start: 2018-01-28 | End: 2018-01-29 | Stop reason: HOSPADM

## 2018-01-28 RX ORDER — PREDNISONE 20 MG/1
20 TABLET ORAL DAILY
Qty: 4 TABLET | Refills: 0
Start: 2018-01-28 | End: 2018-02-01

## 2018-01-28 RX ORDER — LORAZEPAM 2 MG/ML
0.25 INJECTION INTRAMUSCULAR EVERY 6 HOURS PRN
Status: DISCONTINUED | OUTPATIENT
Start: 2018-01-28 | End: 2018-01-28

## 2018-01-28 RX ORDER — LORAZEPAM 0.5 MG/1
0.5 TABLET ORAL EVERY 4 HOURS PRN
Status: DISCONTINUED | OUTPATIENT
Start: 2018-01-28 | End: 2018-01-28 | Stop reason: HOSPADM

## 2018-01-28 RX ORDER — MORPHINE SULFATE 4 MG/ML
4 INJECTION, SOLUTION INTRAMUSCULAR; INTRAVENOUS EVERY 4 HOURS PRN
Status: DISCONTINUED | OUTPATIENT
Start: 2018-01-28 | End: 2018-01-28 | Stop reason: HOSPADM

## 2018-01-28 RX ORDER — MORPHINE SULFATE 2 MG/ML
2 INJECTION, SOLUTION INTRAMUSCULAR; INTRAVENOUS
Status: DISCONTINUED | OUTPATIENT
Start: 2018-01-28 | End: 2018-01-28 | Stop reason: HOSPADM

## 2018-01-28 RX ORDER — ACETAMINOPHEN 650 MG/1
650 SUPPOSITORY RECTAL EVERY 4 HOURS PRN
Status: DISCONTINUED | OUTPATIENT
Start: 2018-01-28 | End: 2018-01-29 | Stop reason: HOSPADM

## 2018-01-28 RX ORDER — LORAZEPAM 2 MG/ML
0.5 INJECTION INTRAMUSCULAR EVERY 6 HOURS PRN
Status: DISCONTINUED | OUTPATIENT
Start: 2018-01-28 | End: 2018-01-29 | Stop reason: HOSPADM

## 2018-01-28 RX ORDER — IPRATROPIUM BROMIDE AND ALBUTEROL SULFATE 2.5; .5 MG/3ML; MG/3ML
3 SOLUTION RESPIRATORY (INHALATION)
Qty: 360 ML | DISCHARGE
Start: 2018-01-28

## 2018-01-28 RX ORDER — GLYCOPYRROLATE 0.2 MG/ML
0.2 INJECTION INTRAMUSCULAR; INTRAVENOUS 4 TIMES DAILY PRN
Status: DISCONTINUED | OUTPATIENT
Start: 2018-01-28 | End: 2018-01-29 | Stop reason: HOSPADM

## 2018-01-28 RX ORDER — ALPRAZOLAM 0.25 MG/1
0.25 TABLET ORAL 3 TIMES DAILY PRN
Status: SHIPPED | OUTPATIENT
Start: 2018-01-28 | End: 2018-02-27

## 2018-01-28 RX ORDER — ONDANSETRON 2 MG/ML
4 INJECTION INTRAMUSCULAR; INTRAVENOUS EVERY 6 HOURS PRN
Status: DISCONTINUED | OUTPATIENT
Start: 2018-01-28 | End: 2018-01-29 | Stop reason: HOSPADM

## 2018-01-28 RX ORDER — LORAZEPAM 2 MG/ML
INJECTION INTRAMUSCULAR
Status: COMPLETED
Start: 2018-01-28 | End: 2018-01-28

## 2018-01-28 RX ORDER — BISACODYL 10 MG
10 SUPPOSITORY, RECTAL RECTAL DAILY PRN
Status: DISCONTINUED | OUTPATIENT
Start: 2018-01-28 | End: 2018-01-29 | Stop reason: HOSPADM

## 2018-01-28 RX ADMIN — IPRATROPIUM BROMIDE AND ALBUTEROL SULFATE 1 AMPULE: .5; 3 SOLUTION RESPIRATORY (INHALATION) at 06:18

## 2018-01-28 RX ADMIN — Medication 10 ML: at 02:34

## 2018-01-28 RX ADMIN — Medication 2 MG: at 04:50

## 2018-01-28 RX ADMIN — IPRATROPIUM BROMIDE AND ALBUTEROL SULFATE 1 AMPULE: .5; 3 SOLUTION RESPIRATORY (INHALATION) at 19:40

## 2018-01-28 RX ADMIN — ENOXAPARIN SODIUM 30 MG: 30 INJECTION SUBCUTANEOUS at 08:05

## 2018-01-28 RX ADMIN — Medication 2 MG: at 07:59

## 2018-01-28 RX ADMIN — Medication 10 ML: at 04:51

## 2018-01-28 RX ADMIN — MORPHINE SULFATE 2 MG: 2 INJECTION, SOLUTION INTRAMUSCULAR; INTRAVENOUS at 21:30

## 2018-01-28 RX ADMIN — Medication 2 MG: at 13:18

## 2018-01-28 RX ADMIN — MORPHINE SULFATE 15 MG: 15 TABLET, FILM COATED, EXTENDED RELEASE ORAL at 08:33

## 2018-01-28 RX ADMIN — GUAIFENESIN 600 MG: 600 TABLET, EXTENDED RELEASE ORAL at 08:31

## 2018-01-28 RX ADMIN — LORAZEPAM 0.25 MG: 2 INJECTION INTRAMUSCULAR at 15:32

## 2018-01-28 RX ADMIN — LORAZEPAM 0.5 MG: 0.5 TABLET ORAL at 18:56

## 2018-01-28 RX ADMIN — ALPRAZOLAM 0.25 MG: 0.25 TABLET ORAL at 04:51

## 2018-01-28 RX ADMIN — Medication 2 MG: at 02:34

## 2018-01-28 RX ADMIN — MORPHINE SULFATE 4 MG: 4 INJECTION, SOLUTION INTRAMUSCULAR; INTRAVENOUS at 18:56

## 2018-01-28 RX ADMIN — Medication 10 ML: at 23:50

## 2018-01-28 RX ADMIN — FLUOXETINE 20 MG: 20 CAPSULE ORAL at 08:31

## 2018-01-28 RX ADMIN — PREDNISONE 30 MG: 20 TABLET ORAL at 08:31

## 2018-01-28 RX ADMIN — Medication 10 ML: at 08:00

## 2018-01-28 RX ADMIN — LORAZEPAM 0.25 MG: 2 INJECTION INTRAMUSCULAR; INTRAVENOUS at 15:32

## 2018-01-28 RX ADMIN — Medication 10 ML: at 21:31

## 2018-01-28 RX ADMIN — LORAZEPAM 0.5 MG: 2 INJECTION INTRAMUSCULAR; INTRAVENOUS at 22:57

## 2018-01-28 RX ADMIN — Medication 2 MG: at 15:33

## 2018-01-28 RX ADMIN — MORPHINE SULFATE 2 MG: 2 INJECTION, SOLUTION INTRAMUSCULAR; INTRAVENOUS at 23:50

## 2018-01-28 RX ADMIN — Medication 2 MG: at 10:02

## 2018-01-28 RX ADMIN — Medication 10 ML: at 00:27

## 2018-01-28 RX ADMIN — PANTOPRAZOLE SODIUM 40 MG: 40 TABLET, DELAYED RELEASE ORAL at 06:25

## 2018-01-28 RX ADMIN — Medication 2 MG: at 00:27

## 2018-01-28 ASSESSMENT — PAIN DESCRIPTION - PROGRESSION

## 2018-01-28 ASSESSMENT — PAIN SCALES - GENERAL
PAINLEVEL_OUTOF10: 9
PAINLEVEL_OUTOF10: 7
PAINLEVEL_OUTOF10: 8
PAINLEVEL_OUTOF10: 9
PAINLEVEL_OUTOF10: 6
PAINLEVEL_OUTOF10: 8
PAINLEVEL_OUTOF10: 5
PAINLEVEL_OUTOF10: 7
PAINLEVEL_OUTOF10: 8
PAINLEVEL_OUTOF10: 8

## 2018-01-28 NOTE — DISCHARGE SUMMARY
Physician Discharge Summary     Patient ID:  Xenia Hendrix  [id number]  60 y.o.  1955    Admit date: 1/17/2018    Discharge date and time:  1/28/2018    Admission Diagnoses:   Patient Active Problem List   Diagnosis    Pneumonia of both lungs due to infectious organism    Elevated alkaline phosphatase level    COPD exacerbation (HCC)    Pleural effusion    [facility] of [facility] ([facility]    Bone metastasis ([facility]    Prediabetes    Coagulase-negative staphylococcal infection    Chronic respiratory failure with hypoxia (HCC)    Goals of care, counseling/discussion       Discharge Diagnoses: Pneumonia   Acute on chronic respiratory failure with hypoxia  COPD exacerbation  Chronic smoker  Normocytic anemia  Thrombocytopenia  Metastatic adenocarcinoma likely lung with lymphangitic spread  Malignant right pleural effusion  Prediabetes  Elevated alkaline phosphatase    Consults: pulmonary/intensive care and hematology/oncology    Procedures: Right thoracentesis    Hospital Course: 69-year-old unfortunate white gentleman known chronic smoker with known metastatic adenocarcinoma with unknown primary admitted with increased shortness of breath and cough chest x-ray shows pneumonia versus lymphangitic spread head right to pleural effusion thoracentesis did yield malignant effusion patient needed BiPAP and oxygen during his stay was seen by the pulmonary and by oncology patient was going in a downhill course not getting any better with any treatment oncology approached the patient about this stage IV metastatic adenocarcinoma and patient and family decided on hospice Discharge Exam:  See progress note from today    Disposition: Inpatient post  Terminal  Patient Instructions:   Current Discharge Medication List      START taking these medications    Details   ALPRAZolam (XANAX) 0.25 MG tablet Take 1 tablet by mouth 3 times daily as needed for Anxiety for up to 30 days.       ipratropium-albuterol (Dorrine Nestle)

## 2018-01-28 NOTE — PROGRESS NOTES
[facility]  Progress Note    Coni Eldridge  MRN: [id number]  Date: 1/28/2018    Reason for F/U :   Pneumonia    Impression :   · Worsening Acute hypoxic respiratory failure on chronic respiratory failure secondary to Interstitial lung disease, and bilateral pleural effusion  ? Pneumonia versus lymphangitic spread of malignancy. Most likely the latter. · Metastatic stage IV adenocarcinoma with diffuse bony metastases   ? Unknown primarypresumed lung  · Bilateral pleural effusions  ? Status post right-sided thoracentesis and has coagulase negative staph isolated likely a contaminant  ? Fluid cytology is positive for adenocarcinoma  · Underlying COPD   · Elevated alkaline phosphatase likely the bony component  · Constipation    Recommendations:   · The patient has completed a course of antimicrobials and today is day 3 off antibiotics. · ID wise he is stable. There is a slight increase in WBC, but oscillations are within same range. · However, the overall picture shows progressive decline. Patient was able to walk to BR with walker, then could only stand up to urinate; currently can only sit at bedside because of of weakness and SOB. He is rapidly declining from lymphangitic tumor spread. Hematology has addressed futility of chemotherapy at this stage. Comfort measures in place. · Hospice transfer later today. · He continues on supplemental O2 and intermittent BiPAP    Infection Control Recommendations:   Universal precautions    Discharge Planning:   Once insurance approval is obtained for LTAC    Summary of Stay:   INITIAL HISTORY:    Coni Eldridge is a 60y.o.-year-old male admitted on 1/17/2018. He had been recently hospitalized and treated for pneumonia/COPD exacerbation with IV antibiotics and steroids. A CT of the chest done during that stay showed bony metastasis and possible lung metastasis. A left-sided thoracentesis fluid was negative for malignancy.  He was discharged on antibiotics and tapering steroids. Outpatient PET CT scan confirmed bony metastasis but no primary cancer was identified; a bone marrow biopsy showed adenocarcinoma.      He has been getting progressive shortness of breath, cough, as well as back pain - which prompted this admission. He was admitted started on antimicrobial therapy with ceftriaxone and azithromycin for pneumonia. A right sided thoracentesis done and they were gram-positive cocci isolated in the fluid. Vancomycin had been added by the primary team.   This turned out to be coagulase-negative staph and the vancomycin was stopped. He was switched to Zosyn for a potential healthcare associated pneumonia. The patient has had persistent shortness of breath and ended up being transferred into the intensive care unit where he has been on BiPAP intermittently. The right-sided thoracentesis fluid did have adenocarcinoma and the diagnosis at this point in time is prostatic adenocarcinoma of primary lung origin    The patient has completed a 3 day course of azithromycin and ceftriaxone, then followed by a 5 day course of Zosyn and he has been off antimicrobial therapy since 18    CURRENT EVALUATION [de-identified]2018    BP (!) 125/47   Pulse 100   Temp 97.7 °F (36.5 °C) (Axillary)   Resp 14   Ht 6' 1\" (1.854 m)   Wt 220 lb 14.4 oz (100.2 kg)   SpO2 95%   BMI 29.14 kg/m²     Temperature Range: Temp: 97.7 °F (36.5 °C) Temp  Av.1 °F (36.7 °C)  Min: 97.5 °F (36.4 °C)  Max: 98.6 °F (37 °C)     Afebrile  VS stable    The patient was seen examined and evaluated at the bedside. He is very anxious, remains tachypneic. Receiving MS to help. Unable to stand up to urinate. Lungs are distant, clear. He is not moving much air. Has cough with clear sputum production. No new culture data. Patient is stable ID wise after discontinuation of antibiotics, but underlying pulmonary condition seems to be getting rapidly worse.  Comfort measures in BACILLI SEEN (CONCENTRATED SMEAR)    Culture NO GROWTH 3 DAYS    Culture Performed at [address] [facility], [address] [place] [address] [phone]    Status Pending     Body Fluid Culture [[id number]] (Abnormal)  Collected: 01/18/18 1529   Order Status: Completed Specimen: Pleural Fluid Updated: 01/21/18 0644    Specimen Description . PLEURAL FLUID Performed at [address] [facility]    Specimen Description Edi, [address] [facility] [phone]    Special Requests NOT REPORTED    Direct Exam FEW NEUTROPHILS (A)    Direct Exam NO BACTERIA SEEN    Direct Exam Gram stain made from cytocentrifuged specimen.  Organisms and cells will be    Direct Exam  concentrated.     Culture POSITIVE FLUID CULTURE, RN NOTIFIED CHERYL CURRIE AT 1409 ON 1/19/18 (A)    Culture DIRECT GRAM STAIN FROM BOTTLE: GRAM POSITIVE COCCI IN CLUSTERS (A)    Culture STAPHYLOCOCCUS HOMINIS (A)    Culture Performed at [facility] [address] [facility], [address] [place] [address] [phone]    Status FINAL 01/21/2018    Organism SHOM   STAPHYLOCOCCUS HOMINIS     Antibiotic Interpretation FRED Status   Induced Clind Resist Resistant POSITIVE Final   Synercid  NOT REPORTED Final   cefoxitin screen  NOT REPORTED Final   ciprofloxacin  NOT REPORTED Final   clindamycin Resistant <=0.25 RESISTANT Final   erythromycin Resistant >=8 RESISTANT Final   gentamicin Sensitive <=0.5 SUSCEPTIBLE Final   levofloxacin Resistant >=8 RESISTANT Final   linezolid  NOT REPORTED Final   moxifloxacin  NOT REPORTED Final   nitrofurantoin  NOT REPORTED Final   oxacillin Resistant 1 RESISTANT Final   penicillin Resistant >=0.5 RESISTANT Final   rifampin  NOT REPORTED Final   tetracycline Resistant >=16 RESISTANT Final   tigecycline  NOT REPORTED Final   trimethoprim-sulfamethoxazole Resistant 80 RESISTANT Final   vancomycin Sensitive 1 SUSCEPTIBLE Final       Medications:      predniSONE  30 mg Oral Daily    morphine  15 mg Oral 2 times per day    guaiFENesin  600 mg Oral BID    ipratropium-albuterol  1 ampule Inhalation Q4H WA    enoxaparin  30 mg Subcutaneous BID    pantoprazole  40 mg Oral QAM AC    nicotine  1 patch Transdermal Daily    sodium chloride flush  10 mL Intravenous 2 times per day    insulin lispro  0-6 Units Subcutaneous TID WC    insulin lispro  0-3 Units Subcutaneous Nightly    FLUoxetine  20 mg Oral Daily     Thank you for allowing us to participate in the care of this patient. Please call with questions. I have examined the patient, reviewed the patient's medical history in detail and updated the computerized patient record. Above exam and data confirmed. Adelaida Mcgowan MD      Electronically signed by Kevin Johnson MD on 1/28/2018 at 7:31 AM    This note is created with the assistance of a speech recognition program.  While intending to generate a document that actually reflects the content of the visit, the document can still have some errors including those of syntax and sound a like substitutions which may escape proof reading. It such instances, actual meaning can be extrapolated by contextual diversion.

## 2018-01-28 NOTE — PROGRESS NOTES
0015 pt. Becomes anxious. Calls staff. C/o unable to cough up phlegm in throat. bipap in place. Writer to room. bipap mask removed. Pt. Able to cough up moderate amount of phlegm. Phlegm blood tinged, and yellowish. Unable to expell all phlegm perself. Writer sets up suction and assists pt. To remove moderate amount of clear phlegm per suction tip catheter. Pt. C/o back pain. Requests pain meds. Pt. Notified Jasson Hal would medicate with morphine iv. Pt. Agreeable. bipap replaced. 1526 morphine 2mg iv given. Pt. Rests with resp. Easy, unlabored at 16. Cont. To c/o back pain at a 9/10.

## 2018-01-28 NOTE — PROGRESS NOTES
Patient's: Follow-up for chronic respiratory failure  Still needing BiPAP and oxygen short of breath tachypnea  Review of system  No fever no chills no GI/ complaints no polyuria no polydipsia no hypoglycemia  Vitals stable reviewed  Eyes positive pallor no jaundice  Neck supple no JVD  Lungs air entry equal few rhonchi decreased at the bases  Heart regular rate and rhythm no gallop  Abdomen soft possible sounds without any tenderness  Extremities good pulses without edema negative Homans sign  Assessment and plan  Acute on chronic respiratory failure with hypoxia  Metastatic adenocarcinoma likely of lumbar region with the right malignant pleural effusion  Hyperglycemia  Normocytic anemia  Elevated alkaline phosphatase is due to bone metastasis  Thrombocytopenia it was cleared by oncology to use Lovenox as long as platelets above 31,395  Meds labs reviewed to hospice today

## 2018-01-28 NOTE — PLAN OF CARE
Problem: Pain:  Goal: Pain level will decrease  Pain level will decrease   Outcome: Ongoing  Pt. Cont. To have back pain 9/10. IV MORPHINE given as needed for pain with pain relief noted. Problem: Falls - Risk of  Intervention: Fall risk assessment  No falls this shift. Goal: Absence of falls  Outcome: Ongoing      Problem: Musculor/Skeletal Functional Status  Goal: Highest potential functional level  Outcome: Ongoing  Pt. Able to move self in bed. Weakness noted at rest.   Assisted with ADLs as needed. Cont. To monitor.

## 2018-01-29 VITALS
DIASTOLIC BLOOD PRESSURE: 59 MMHG | OXYGEN SATURATION: 93 % | RESPIRATION RATE: 19 BRPM | HEART RATE: 106 BPM | TEMPERATURE: 98.2 F | SYSTOLIC BLOOD PRESSURE: 137 MMHG

## 2018-01-29 LAB
CULTURE: NORMAL
CULTURE: NORMAL
Lab: NORMAL
SPECIMEN DESCRIPTION: NORMAL
SPECIMEN DESCRIPTION: NORMAL
STATUS: NORMAL

## 2018-01-29 PROCEDURE — 94760 N-INVAS EAR/PLS OXIMETRY 1: CPT

## 2018-01-29 PROCEDURE — 94660 CPAP INITIATION&MGMT: CPT

## 2018-01-29 PROCEDURE — 2580000003 HC RX 258: Performed by: INTERNAL MEDICINE

## 2018-01-29 PROCEDURE — 94640 AIRWAY INHALATION TREATMENT: CPT

## 2018-01-29 PROCEDURE — 6370000000 HC RX 637 (ALT 250 FOR IP): Performed by: INTERNAL MEDICINE

## 2018-01-29 PROCEDURE — 6360000002 HC RX W HCPCS: Performed by: INTERNAL MEDICINE

## 2018-01-29 RX ADMIN — Medication 10 ML: at 00:53

## 2018-01-29 RX ADMIN — MORPHINE SULFATE 2 MG: 2 INJECTION, SOLUTION INTRAMUSCULAR; INTRAVENOUS at 07:55

## 2018-01-29 RX ADMIN — MORPHINE SULFATE 2 MG: 2 INJECTION, SOLUTION INTRAMUSCULAR; INTRAVENOUS at 00:53

## 2018-01-29 RX ADMIN — MORPHINE SULFATE 2 MG: 2 INJECTION, SOLUTION INTRAMUSCULAR; INTRAVENOUS at 04:35

## 2018-01-29 RX ADMIN — MORPHINE SULFATE 2 MG: 2 INJECTION, SOLUTION INTRAMUSCULAR; INTRAVENOUS at 03:33

## 2018-01-29 RX ADMIN — MORPHINE SULFATE 2 MG: 2 INJECTION, SOLUTION INTRAMUSCULAR; INTRAVENOUS at 09:48

## 2018-01-29 RX ADMIN — LORAZEPAM: 2 INJECTION INTRAMUSCULAR; INTRAVENOUS at 04:36

## 2018-01-29 RX ADMIN — IPRATROPIUM BROMIDE AND ALBUTEROL SULFATE 1 AMPULE: .5; 3 SOLUTION RESPIRATORY (INHALATION) at 07:40

## 2018-01-29 RX ADMIN — MORPHINE SULFATE 2 MG: 2 INJECTION, SOLUTION INTRAMUSCULAR; INTRAVENOUS at 02:31

## 2018-01-29 RX ADMIN — MORPHINE SULFATE 2 MG: 2 INJECTION, SOLUTION INTRAMUSCULAR; INTRAVENOUS at 10:47

## 2018-01-29 RX ADMIN — Medication 10 ML: at 07:55

## 2018-01-29 RX ADMIN — Medication 10 ML: at 06:27

## 2018-01-29 RX ADMIN — Medication 10 ML: at 02:31

## 2018-01-29 RX ADMIN — MORPHINE SULFATE 2 MG: 2 INJECTION, SOLUTION INTRAMUSCULAR; INTRAVENOUS at 06:27

## 2018-01-29 RX ADMIN — LORAZEPAM 0.5 MG: 2 INJECTION INTRAMUSCULAR; INTRAVENOUS at 10:46

## 2018-01-29 RX ADMIN — Medication 10 ML: at 03:33

## 2018-01-29 ASSESSMENT — PAIN SCALES - GENERAL
PAINLEVEL_OUTOF10: 9
PAINLEVEL_OUTOF10: 9
PAINLEVEL_OUTOF10: 0
PAINLEVEL_OUTOF10: 10
PAINLEVEL_OUTOF10: 0
PAINLEVEL_OUTOF10: 8
PAINLEVEL_OUTOF10: 10

## 2018-01-29 ASSESSMENT — PAIN DESCRIPTION - PROGRESSION
CLINICAL_PROGRESSION: NOT CHANGED

## 2018-01-29 ASSESSMENT — PAIN SCALES - WONG BAKER: WONGBAKER_NUMERICALRESPONSE: 10

## 2018-01-30 LAB — MOLECULAR CYTOGENIC FISH: NORMAL

## 2018-02-01 LAB — SURGICAL PATHOLOGY REPORT: NORMAL

## 2018-02-04 PROBLEM — C79.9 METASTATIC ADENOCARCINOMA (HCC): Status: ACTIVE | Noted: 2018-02-04

## 2018-02-04 NOTE — H&P
History and Physical      CHIEF COMPLAINT:  Metastatic adenocarcinoma    History of Present Illness: Patient discharged from the hospital under hospice care for metastatic adenocarcinoma COPD exacerbation and acute respiratory failure        Past Medical History:   Diagnosis Date    Chronic fatigue     [facility]     Depression     Metastatic adenocarcinoma to bone marrow ([facility]          Past Surgical History:   Procedure Laterality Date    APPENDECTOMY         Medications Prior to Admission:    No prescriptions prior to admission. Allergies:    Review of patient's allergies indicates no known allergies. Social History:    reports that he has been smoking Cigarettes. He has a 25.00 pack-year smoking history. He has never used smokeless tobacco. He reports that he does not drink alcohol. Family History:   family history is not on file. REVIEW OF SYSTEMS:  Patient did not have any fever or chills positive shortness of breaths positive tachypnea occasional cough positive fatigue.   No cardiac complaints and is no GI/ complaints no polyuria no polydipsia no hypoglycemia  PHYSICAL EXAM:  Head normocephalic/atraumatic  Eyes no pallor no jaundice  Lungs air entry equal decreased at the bases few rhonchi  Heart regular rate and rhythm no gallop  Abdomen soft possible sounds without any tenderness  Extremities good pulses without edema negative Homans sign  Neuro alert oriented ×2 no focal deficit  Vitals:  BP (!) 137/59   Pulse 106   Temp 98.2 °F (36.8 °C) (Axillary)   Resp 19   SpO2 93%       ASSESSMENT:    Metastatic adenocarcinoma likely from the lung  Acute respiratory failure needing BiPAP  Copd exac  Patient Active Problem List   Diagnosis    Pneumonia of both lungs due to infectious organism    Elevated alkaline phosphatase level    COPD exacerbation (HCC)    Malignant pleural effusion    Adenocarcinoma of unknown origin ([facility]    Metastatic adenocarcinoma involving skeletal bone with unknown primary site ([facility] [address]    Prediabetes    Coagulase-negative staphylococcal infection    Chronic respiratory failure with hypoxia (HCC)    Goals of care, counseling/discussion    Acute on chronic respiratory failure with hypoxia (HCC)    Normocytic anemia    Thrombocytopenia (HCC)       Patient is terminal under hospice care  See la Cordova MD  4:25 PM  2/4/2018

## 2018-02-12 LAB
CULTURE: NORMAL
CULTURE: NORMAL
DIRECT EXAM: NORMAL
Lab: NORMAL
SPECIMEN DESCRIPTION: NORMAL
SPECIMEN DESCRIPTION: NORMAL
STATUS: NORMAL

## 2018-03-05 LAB
CULTURE: NORMAL
CULTURE: NORMAL
DIRECT EXAM: NORMAL
Lab: NORMAL
SPECIMEN DESCRIPTION: NORMAL
STATUS: NORMAL

## 2025-02-13 NOTE — PROGRESS NOTES
Follow-up COPD  Less shortness of breath still with cough with thick occasional yellow phlegm no PND no orthopnea no tachypnea  Review of system  No fever no chills no GI/ complaints except some constipation better with the Colace no cardiac complaints   Polyuria no polydipsia no hypoglycemia no TIA no bleeding no headache no sinus pain no sore throat no skin lesions  Vitals stable  Eyes mild pallor no jaundice  Lungs air entry equal few rhonchi decreased at the bases with crackles  Heart regular rate and rhythm no gallop  Abdomen soft possible sounds without any tenderness without any megaly  Extremities good pulses without edema negative Homans sign  Neuro alert oriented ×3 with no focal deficit  Assessment and plan  Pneumonia versus lymphangitic spread continue with antibiotics bronchodilators  COPD exacerbation continue with the steroids and oxygen bronchodilators  Chronic respiratory failure on home O2  Metastatic adenocarcinoma unknown primary  Chronic smoker  Prediabetes  Normocytic anemia  Thrombocytopenia  Meds labs reviewed continue with antibiotics decrease steroids continue with the bronchodilators DVT prophylaxis continue with insulin coverage before meals and bedtime physical therapy occupational therapy will ask social service to see for possible rehab see orders negative